# Patient Record
Sex: FEMALE | Race: WHITE | NOT HISPANIC OR LATINO | Employment: FULL TIME | URBAN - METROPOLITAN AREA
[De-identification: names, ages, dates, MRNs, and addresses within clinical notes are randomized per-mention and may not be internally consistent; named-entity substitution may affect disease eponyms.]

---
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
## 2018-07-04 ENCOUNTER — OFFICE VISIT (OUTPATIENT)
Dept: URGENT CARE | Facility: PHYSICIAN GROUP | Age: 18
End: 2018-07-04
Payer: COMMERCIAL

## 2018-07-04 VITALS
RESPIRATION RATE: 16 BRPM | HEART RATE: 97 BPM | DIASTOLIC BLOOD PRESSURE: 62 MMHG | WEIGHT: 124.8 LBS | BODY MASS INDEX: 21.31 KG/M2 | OXYGEN SATURATION: 98 % | SYSTOLIC BLOOD PRESSURE: 100 MMHG | TEMPERATURE: 98.6 F | HEIGHT: 64 IN

## 2018-07-04 DIAGNOSIS — K52.9 ACUTE GASTROENTERITIS: ICD-10-CM

## 2018-07-04 PROCEDURE — 99214 OFFICE O/P EST MOD 30 MIN: CPT | Performed by: FAMILY MEDICINE

## 2018-07-04 RX ORDER — DICYCLOMINE HCL 20 MG
TABLET ORAL
Qty: 20 TAB | Refills: 1 | Status: SHIPPED | OUTPATIENT
Start: 2018-07-04 | End: 2018-08-09

## 2018-07-04 RX ORDER — ONDANSETRON 4 MG/1
TABLET, ORALLY DISINTEGRATING ORAL
Qty: 15 TAB | Refills: 1 | Status: SHIPPED | OUTPATIENT
Start: 2018-07-04 | End: 2018-08-09

## 2018-07-04 NOTE — LETTER
July 4, 2018         Patient: Vita Giordano   YOB: 2000   Date of Visit: 7/4/2018           To Whom it May Concern:    Vita Giordano was seen in my clinic on 7/4/2018.     Please excuse from work for 7/3 thru and including 7/5/18 due to medical condition.    If you have any questions or concerns, please don't hesitate to call.        Sincerely,           Santos Anguiano M.D.  Electronically Signed

## 2018-07-04 NOTE — PROGRESS NOTES
Chief Complaint:    Chief Complaint   Patient presents with   • Abdominal Pain     x 3 days   • Diarrhea     x 3 days   • Nausea     x 1 day   • Emesis     x 1 day   • Gastrophageal Reflux       History of Present Illness:    This is a new problem. Symptoms started 7/2/18. She has had nausea, vomiting, diarrhea, stomach cramps, and occl GERD type of symptom. Currently, the stomach cramps and diarrhea persist, but not currently having GERD, nausea, or vomiting. Last vomited yesterday. Diarrhea has been too numerous to count for 7/2 and 7/3. 3 episodes of diarrhea today. No fever or urine symptoms. No close contacts with similar symptoms, recent travel, water activities such as lakes or rivers, or recent antibiotic use. Tried Pepto Bismol without help.      Review of Systems:    Constitutional: Negative for fever, chills, and diaphoresis.   Eyes: Negative for change in vision, photophobia, pain, redness, and discharge.  ENT: Negative for ear pain, ear discharge, hearing loss, tinnitus, nasal congestion, nosebleeds, and sore throat.    Respiratory: Negative for cough, hemoptysis, sputum production, shortness of breath, wheezing, and stridor.    Cardiovascular: Negative for chest pain, palpitations, orthopnea, claudication, leg swelling, and PND.   Gastrointestinal: See HPI.  Genitourinary: Negative for dysuria, urinary urgency, urinary frequency, hematuria, and flank pain.   Musculoskeletal: Negative for myalgias, joint pain, neck pain, and back pain.   Skin: Negative for rash and itching.   Neurological: Negative for dizziness, tingling, tremors, sensory change, speech change, focal weakness, seizures, loss of consciousness, and headaches.   Endo: Negative for polydipsia.   Heme: Does not bruise/bleed easily.   Psychiatric/Behavioral: Negative for depression, suicidal ideas, hallucinations, memory loss and substance abuse. The patient is not nervous/anxious and does not have insomnia.        Past Medical  "History:    History reviewed. No pertinent past medical history.    Past Surgical History:    History reviewed. No pertinent surgical history.    Social History:    Social History     Social History   • Marital status: Single     Spouse name: N/A   • Number of children: N/A   • Years of education: N/A     Occupational History   • Not on file.     Social History Main Topics   • Smoking status: Never Smoker   • Smokeless tobacco: Never Used   • Alcohol use No   • Drug use: No   • Sexual activity: Not on file     Other Topics Concern   • Not on file     Social History Narrative   • No narrative on file     Family History:    History reviewed. No pertinent family history.    Medications:    No current outpatient prescriptions on file prior to visit.     No current facility-administered medications on file prior to visit.      Allergies:    No Known Allergies      Vitals:    Vitals:    07/04/18 1046   BP: 100/62   Pulse: 97   Resp: 16   Temp: 37 °C (98.6 °F)   SpO2: 98%   Weight: 56.6 kg (124 lb 12.8 oz)   Height: 1.626 m (5' 4\")       Physical Exam:    Constitutional: Vital signs reviewed. Appears well-developed and well-nourished. No acute distress.   Eyes: Sclera white, conjunctivae clear.   ENT: External ears normal. Hearing normal.   Neck: Neck supple.   Cardiovascular: Regular rate and rhythm. No murmur.  Pulmonary/Chest: Respirations non-labored. Clear to auscultation bilaterally.  Abdomen: Bowel sounds are hyperactive. Soft, non-distended, mild diffuse tenderness to palpation.  Musculoskeletal: Normal gait. Normal range of motion. No muscular atrophy or weakness.  Neurological: Alert and oriented to person, place, and time. Muscle tone normal. Coordination normal.   Skin: No rashes or lesions. Warm, dry, normal turgor.  Psychiatric: Normal mood and affect. Behavior is normal. Judgment and thought content normal.       Assessment / Plan:    1. Acute gastroenteritis  - ondansetron (ZOFRAN ODT) 4 MG TABLET " DISPERSIBLE; 1 TAB, ALLOW TO DISINTEGRATE IN MOUTH, EVERY 8 HOURS ONLY IF NEEDED FOR NAUSEA OR VOMITING.  Dispense: 15 Tab; Refill: 1  - dicyclomine (BENTYL) 20 MG Tab; 1 TAB BY MOUTH EVERY 6 HOURS ONLY IF NEEDED FOR ABDOMINAL CRAMPS AND/OR DIARRHEA.  Dispense: 20 Tab; Refill: 1  There are no diagnoses linked to this encounter.      Work note given - excuse for 7/3 thru and including 7/5/18.    Discussed with her DDX and management options.    Likely viral etiology that will have to self-resolve.    Agreeable to medications prescribed.    Recommended stay hydrated with small but frequent quantities of p.o. fluids. May eat small quantities of soft, bland foods. Advance diet as tolerated.    Discussed signs and symptoms of dehydration and to go to ER if symptoms are severe for likely IVF (we do not do here).    May return to urgent care if getting worse, change in symptoms, or not better with time and above.

## 2018-07-17 ENCOUNTER — OFFICE VISIT (OUTPATIENT)
Dept: URGENT CARE | Facility: PHYSICIAN GROUP | Age: 18
End: 2018-07-17
Payer: COMMERCIAL

## 2018-07-17 ENCOUNTER — HOSPITAL ENCOUNTER (OUTPATIENT)
Dept: LAB | Facility: MEDICAL CENTER | Age: 18
End: 2018-07-17
Attending: PHYSICIAN ASSISTANT
Payer: COMMERCIAL

## 2018-07-17 VITALS
SYSTOLIC BLOOD PRESSURE: 108 MMHG | TEMPERATURE: 99.1 F | OXYGEN SATURATION: 97 % | WEIGHT: 129 LBS | HEIGHT: 64 IN | HEART RATE: 74 BPM | BODY MASS INDEX: 22.02 KG/M2 | DIASTOLIC BLOOD PRESSURE: 72 MMHG | RESPIRATION RATE: 16 BRPM

## 2018-07-17 DIAGNOSIS — R10.13 EPIGASTRIC PAIN: ICD-10-CM

## 2018-07-17 DIAGNOSIS — R11.2 NAUSEA AND VOMITING, INTRACTABILITY OF VOMITING NOT SPECIFIED, UNSPECIFIED VOMITING TYPE: ICD-10-CM

## 2018-07-17 LAB
ALBUMIN SERPL BCP-MCNC: 4.1 G/DL (ref 3.2–4.9)
ALBUMIN/GLOB SERPL: 1.4 G/DL
ALP SERPL-CCNC: 45 U/L (ref 30–99)
ALT SERPL-CCNC: 11 U/L (ref 2–50)
ANION GAP SERPL CALC-SCNC: 8 MMOL/L (ref 0–11.9)
APPEARANCE UR: NORMAL
AST SERPL-CCNC: 18 U/L (ref 12–45)
BASOPHILS # BLD AUTO: 1.4 % (ref 0–1.8)
BASOPHILS # BLD: 0.07 K/UL (ref 0–0.12)
BILIRUB SERPL-MCNC: 0.5 MG/DL (ref 0.1–1.5)
BILIRUB UR STRIP-MCNC: NEGATIVE MG/DL
BUN SERPL-MCNC: 14 MG/DL (ref 8–22)
C DIFF DNA SPEC QL NAA+PROBE: NEGATIVE
C DIFF TOX GENS STL QL NAA+PROBE: NEGATIVE
CALCIUM SERPL-MCNC: 9 MG/DL (ref 8.5–10.5)
CHLORIDE SERPL-SCNC: 109 MMOL/L (ref 96–112)
CO2 SERPL-SCNC: 23 MMOL/L (ref 20–33)
COLOR UR AUTO: NORMAL
CREAT SERPL-MCNC: 0.75 MG/DL (ref 0.5–1.4)
EOSINOPHIL # BLD AUTO: 0.33 K/UL (ref 0–0.51)
EOSINOPHIL NFR BLD: 6.5 % (ref 0–6.9)
ERYTHROCYTE [DISTWIDTH] IN BLOOD BY AUTOMATED COUNT: 42.3 FL (ref 35.9–50)
GLOBULIN SER CALC-MCNC: 3 G/DL (ref 1.9–3.5)
GLUCOSE SERPL-MCNC: 76 MG/DL (ref 65–99)
GLUCOSE UR STRIP.AUTO-MCNC: NEGATIVE MG/DL
HCT VFR BLD AUTO: 41.3 % (ref 37–47)
HGB BLD-MCNC: 13.7 G/DL (ref 12–16)
IMM GRANULOCYTES # BLD AUTO: 0 K/UL (ref 0–0.11)
IMM GRANULOCYTES NFR BLD AUTO: 0 % (ref 0–0.9)
INT CON NEG: NEGATIVE
INT CON POS: POSITIVE
KETONES UR STRIP.AUTO-MCNC: NEGATIVE MG/DL
LEUKOCYTE ESTERASE UR QL STRIP.AUTO: NEGATIVE
LIPASE SERPL-CCNC: 16 U/L (ref 11–82)
LYMPHOCYTES # BLD AUTO: 1.69 K/UL (ref 1–4.8)
LYMPHOCYTES NFR BLD: 33.5 % (ref 22–41)
MCH RBC QN AUTO: 29.8 PG (ref 27–33)
MCHC RBC AUTO-ENTMCNC: 33.2 G/DL (ref 33.6–35)
MCV RBC AUTO: 90 FL (ref 81.4–97.8)
MONOCYTES # BLD AUTO: 0.3 K/UL (ref 0–0.85)
MONOCYTES NFR BLD AUTO: 6 % (ref 0–13.4)
NEUTROPHILS # BLD AUTO: 2.65 K/UL (ref 2–7.15)
NEUTROPHILS NFR BLD: 52.6 % (ref 44–72)
NITRITE UR QL STRIP.AUTO: NEGATIVE
NRBC # BLD AUTO: 0 K/UL
NRBC BLD-RTO: 0 /100 WBC
PH UR STRIP.AUTO: 6 [PH] (ref 5–8)
PLATELET # BLD AUTO: 268 K/UL (ref 164–446)
PMV BLD AUTO: 10.3 FL (ref 9–12.9)
POC URINE PREGNANCY TEST: NEGATIVE
POTASSIUM SERPL-SCNC: 3.9 MMOL/L (ref 3.6–5.5)
PROT SERPL-MCNC: 7.1 G/DL (ref 6–8.2)
PROT UR QL STRIP: 100 MG/DL
RBC # BLD AUTO: 4.59 M/UL (ref 4.2–5.4)
RBC UR QL AUTO: NORMAL
SODIUM SERPL-SCNC: 140 MMOL/L (ref 135–145)
SP GR UR STRIP.AUTO: 1
UROBILINOGEN UR STRIP-MCNC: NEGATIVE MG/DL
WBC # BLD AUTO: 5 K/UL (ref 4.8–10.8)

## 2018-07-17 PROCEDURE — 80053 COMPREHEN METABOLIC PANEL: CPT

## 2018-07-17 PROCEDURE — 85025 COMPLETE CBC W/AUTO DIFF WBC: CPT

## 2018-07-17 PROCEDURE — 83013 H PYLORI (C-13) BREATH: CPT

## 2018-07-17 PROCEDURE — 81002 URINALYSIS NONAUTO W/O SCOPE: CPT | Performed by: PHYSICIAN ASSISTANT

## 2018-07-17 PROCEDURE — 87493 C DIFF AMPLIFIED PROBE: CPT

## 2018-07-17 PROCEDURE — 36415 COLL VENOUS BLD VENIPUNCTURE: CPT

## 2018-07-17 PROCEDURE — 99214 OFFICE O/P EST MOD 30 MIN: CPT | Mod: 25 | Performed by: PHYSICIAN ASSISTANT

## 2018-07-17 PROCEDURE — 87046 STOOL CULTR AEROBIC BACT EA: CPT

## 2018-07-17 PROCEDURE — 87045 FECES CULTURE AEROBIC BACT: CPT

## 2018-07-17 PROCEDURE — 83690 ASSAY OF LIPASE: CPT

## 2018-07-17 PROCEDURE — 81025 URINE PREGNANCY TEST: CPT | Performed by: PHYSICIAN ASSISTANT

## 2018-07-17 RX ORDER — OMEPRAZOLE 20 MG/1
20 CAPSULE, DELAYED RELEASE ORAL DAILY
Qty: 30 CAP | Refills: 0 | Status: SHIPPED | OUTPATIENT
Start: 2018-07-17 | End: 2018-08-09

## 2018-07-17 RX ORDER — ONDANSETRON 4 MG/1
4 TABLET, FILM COATED ORAL EVERY 4 HOURS PRN
Qty: 30 TAB | Refills: 0 | Status: SHIPPED | OUTPATIENT
Start: 2018-07-17 | End: 2018-08-09

## 2018-07-17 ASSESSMENT — ENCOUNTER SYMPTOMS
NAUSEA: 1
DIARRHEA: 1
FLANK PAIN: 0
DIZZINESS: 0
BLOOD IN STOOL: 0
FEVER: 0
MUSCULOSKELETAL NEGATIVE: 1
VOMITING: 1
CONSTIPATION: 0
CHILLS: 0
ABDOMINAL PAIN: 1
SHORTNESS OF BREATH: 0

## 2018-07-17 ASSESSMENT — PAIN SCALES - GENERAL: PAINLEVEL: 4=SLIGHT-MODERATE PAIN

## 2018-07-17 NOTE — PATIENT INSTRUCTIONS
Food Choices for Gastroesophageal Reflux Disease, Adult  When you have gastroesophageal reflux disease (GERD), the foods you eat and your eating habits are very important. Choosing the right foods can help ease the discomfort of GERD.  WHAT GENERAL GUIDELINES DO I NEED TO FOLLOW?  · Choose fruits, vegetables, whole grains, low-fat dairy products, and low-fat meat, fish, and poultry.  · Limit fats such as oils, salad dressings, butter, nuts, and avocado.  · Keep a food diary to identify foods that cause symptoms.  · Avoid foods that cause reflux. These may be different for different people.  · Eat frequent small meals instead of three large meals each day.  · Eat your meals slowly, in a relaxed setting.  · Limit fried foods.  · Cook foods using methods other than frying.  · Avoid drinking alcohol.  · Avoid drinking large amounts of liquids with your meals.  · Avoid bending over or lying down until 2-3 hours after eating.  WHAT FOODS ARE NOT RECOMMENDED?  The following are some foods and drinks that may worsen your symptoms:  Vegetables  Tomatoes. Tomato juice. Tomato and spaghetti sauce. Chili peppers. Onion and garlic. Horseradish.  Fruits  Oranges, grapefruit, and lemon (fruit and juice).  Meats  High-fat meats, fish, and poultry. This includes hot dogs, ribs, ham, sausage, salami, and cooper.  Dairy  Whole milk and chocolate milk. Sour cream. Cream. Butter. Ice cream. Cream cheese.   Beverages  Coffee and tea, with or without caffeine. Carbonated beverages or energy drinks.  Condiments  Hot sauce. Barbecue sauce.   Sweets/Desserts  Chocolate and cocoa. Donuts. Peppermint and spearmint.  Fats and Oils  High-fat foods, including French fries and potato chips.  Other  Vinegar. Strong spices, such as black pepper, white pepper, red pepper, cayenne, gary powder, cloves, ginger, and chili powder.  The items listed above may not be a complete list of foods and beverages to avoid. Contact your dietitian for more  information.     This information is not intended to replace advice given to you by your health care provider. Make sure you discuss any questions you have with your health care provider.     Document Released: 12/18/2006 Document Revised: 01/08/2016 Document Reviewed: 10/22/2014  [employer] Interactive Patient Education ©2016 [employer]

## 2018-07-17 NOTE — PROGRESS NOTES
"Subjective:      Vita Giordano is a 18 y.o. female who presents with Abdominal Pain (was seen on 4th of July for the same thing); Emesis; Diarrhea; and Headache            HPI   Patient presents to urgent ongoing issues with abdominal pain, nausea, vomiting, and diarrhea. She was originally seen 2 weeks ago, diagnosed with gastroenteritis and given zofran and bentyl. She states her symptoms resolved completely for about 1.5 weeks before returning last night. She states her diarrhea is darkly colored. No fevers, chills, back pain, constipation, or urinary symptoms. No personal or family history of colon disease. No recent travel, suspect food intake, recent use of antibiotics, or sick contacts. She denies frequent use of nsaids medications.     Review of Systems   Constitutional: Negative for chills and fever.   HENT: Negative for congestion.    Respiratory: Negative for shortness of breath.    Cardiovascular: Negative for chest pain.   Gastrointestinal: Positive for abdominal pain, diarrhea, nausea and vomiting. Negative for blood in stool and constipation.   Genitourinary: Negative for dysuria, flank pain, frequency, hematuria and urgency.   Musculoskeletal: Negative.    Skin: Negative for rash.   Neurological: Negative for dizziness.        Objective:     /72   Pulse 74   Temp 37.3 °C (99.1 °F)   Resp 16   Ht 1.626 m (5' 4\")   Wt 58.5 kg (129 lb)   LMP 06/11/2018   SpO2 97%   BMI 22.14 kg/m²      Physical Exam   Constitutional: She is oriented to person, place, and time. She appears well-developed and well-nourished. No distress.   HENT:   Head: Normocephalic and atraumatic.   Eyes: Pupils are equal, round, and reactive to light.   Neck: Normal range of motion.   Cardiovascular: Normal rate.    Pulmonary/Chest: Effort normal.   Abdominal: Soft. Normal appearance and bowel sounds are normal. There is tenderness in the epigastric area. There is no rebound, no CVA tenderness, no tenderness at McBurney's " point and negative Nichols's sign.   Genitourinary: Rectal exam shows guaiac positive stool. Rectal exam shows no external hemorrhoid, no internal hemorrhoid and no fissure.   Musculoskeletal: Normal range of motion.   Neurological: She is alert and oriented to person, place, and time.   Skin: Skin is warm and dry. She is not diaphoretic.   Psychiatric: She has a normal mood and affect. Her behavior is normal.   Nursing note and vitals reviewed.         PMH:  has no past medical history on file.  MEDS:   Current Outpatient Prescriptions:   •  Etonogestrel (NEXPLANON SC), Inject  as instructed., Disp: , Rfl:   •  omeprazole (PRILOSEC) 20 MG delayed-release capsule, Take 1 Cap by mouth every day., Disp: 30 Cap, Rfl: 0  •  ondansetron (ZOFRAN) 4 MG Tab tablet, Take 1 Tab by mouth every four hours as needed for Nausea/Vomiting., Disp: 30 Tab, Rfl: 0  •  ondansetron (ZOFRAN ODT) 4 MG TABLET DISPERSIBLE, 1 TAB, ALLOW TO DISINTEGRATE IN MOUTH, EVERY 8 HOURS ONLY IF NEEDED FOR NAUSEA OR VOMITING., Disp: 15 Tab, Rfl: 1  •  dicyclomine (BENTYL) 20 MG Tab, 1 TAB BY MOUTH EVERY 6 HOURS ONLY IF NEEDED FOR ABDOMINAL CRAMPS AND/OR DIARRHEA., Disp: 20 Tab, Rfl: 1  ALLERGIES: No Known Allergies  SURGHX: History reviewed. No pertinent surgical history.  SOCHX:  reports that she has never smoked. She has never used smokeless tobacco. She reports that she does not drink alcohol or use drugs.  FH: family history is not on file.    POCT Urinalysis:  Component Results     Component Value Ref Range & Units Status   POC Color Brown  Negative Final   POC Appearance Cloudy  Negative Final   POC Leukocyte Esterase Negative  Negative Final   POC Nitrites Negative  Negative Final   POC Urobiligen Negative  Negative (0.2) mg/dL Final   POC Protein 100  Negative mg/dL Final   POC Urine PH 6.0  5.0 - 8.0 Final   POC Blood Hemolyzed  Negative Final   POC Specific Gravity 1.005  <1.005 - >1.030 Final   POC Ketones Negative  Negative mg/dL Final   POC  Bilirubin Negative  Negative mg/dL Final   POC Glucose Negative  Negative mg/dL Final   Last Resulted Time   Tue Jul 17, 2018 12:04 PM        Assessment/Plan:     1. Epigastric pain  - POCT Urinalysis --> + blood (patient currently has menstrual spotting), otherwise normal  - POCT Pregnancy --> negative  - CBC WITH DIFFERENTIAL; Future  - COMP METABOLIC PANEL; Future  - CULTURE STOOL  - C DIFFICILE TOXINS A+B, EIA  - LIPASE; Future  - H. PYLORI BREATH TEST  - REFERRAL TO GASTROENTEROLOGY  - omeprazole (PRILOSEC) 20 MG delayed-release capsule; Take 1 Cap by mouth every day.  Dispense: 30 Cap; Refill: 0    2. Nausea and vomiting, intractability of vomiting not specified, unspecified vomiting type    - ondansetron (ZOFRAN) 4 MG Tab tablet; Take 1 Tab by mouth every four hours as needed for Nausea/Vomiting.  Dispense: 30 Tab; Refill: 0    Patient does have slight epigastric pain and + stool guaiac in clinic today. Will obtain blood work and test for h pylori. Advised to start taking prilosec 20 mg daily after obtaining breath test for h pylori. Zofran as needed for nausea. Discussed GERD diet and handout given. Increased fluids to prevent dehydration. Pending lab work results. The patient demonstrated a good understanding and agreed with the treatment plan.

## 2018-07-17 NOTE — LETTER
July 17, 2018         Patient: Vita Giordano   YOB: 2000   Date of Visit: 7/17/2018           To Whom it May Concern:    Vita Giordano was seen in my clinic on 7/17/2018. Please excuse her absence from 7/16/18-7/17/18    If you have any questions or concerns, please don't hesitate to call.        Sincerely,           Rosalina Urbina P.A.-C.  Electronically Signed

## 2018-07-20 ENCOUNTER — TELEPHONE (OUTPATIENT)
Dept: URGENT CARE | Facility: PHYSICIAN GROUP | Age: 18
End: 2018-07-20

## 2018-07-20 LAB
BACTERIA STL CULT: NORMAL
SIGNIFICANT IND 70042: NORMAL
SITE SITE: NORMAL
SOURCE SOURCE: NORMAL
UREA BREATH TEST QL: NEGATIVE

## 2018-07-20 NOTE — TELEPHONE ENCOUNTER
Spoke to patient and informed her of negative lab work results. She states she is feeling much improved, no nausea or vomiting since the date of visit. Encouraged to continue taking prilosec along with diet modification for 4 weeks. Follow up with GI if needed. She states understanding and appreciates the phone call.

## 2018-07-24 ENCOUNTER — TELEPHONE (OUTPATIENT)
Dept: URGENT CARE | Facility: PHYSICIAN GROUP | Age: 18
End: 2018-07-24

## 2018-08-09 ENCOUNTER — OFFICE VISIT (OUTPATIENT)
Dept: URGENT CARE | Facility: PHYSICIAN GROUP | Age: 18
End: 2018-08-09
Payer: COMMERCIAL

## 2018-08-09 VITALS
HEIGHT: 64 IN | TEMPERATURE: 98.4 F | WEIGHT: 131 LBS | SYSTOLIC BLOOD PRESSURE: 108 MMHG | DIASTOLIC BLOOD PRESSURE: 72 MMHG | BODY MASS INDEX: 22.36 KG/M2 | OXYGEN SATURATION: 100 % | HEART RATE: 88 BPM | RESPIRATION RATE: 16 BRPM

## 2018-08-09 DIAGNOSIS — Z78.9 MEDICATION INTOLERANCE: ICD-10-CM

## 2018-08-09 DIAGNOSIS — R11.2 DRUG-INDUCED NAUSEA AND VOMITING: ICD-10-CM

## 2018-08-09 DIAGNOSIS — T50.905A DRUG-INDUCED NAUSEA AND VOMITING: ICD-10-CM

## 2018-08-09 PROCEDURE — 99214 OFFICE O/P EST MOD 30 MIN: CPT | Performed by: PHYSICIAN ASSISTANT

## 2018-08-09 RX ORDER — IBUPROFEN 800 MG/1
800 TABLET ORAL EVERY 8 HOURS PRN
Qty: 24 TAB | Refills: 0 | Status: SHIPPED | OUTPATIENT
Start: 2018-08-09 | End: 2018-08-12

## 2018-08-09 RX ORDER — ONDANSETRON 4 MG/1
4 TABLET, FILM COATED ORAL EVERY 4 HOURS PRN
Qty: 20 TAB | Refills: 0 | Status: SHIPPED | OUTPATIENT
Start: 2018-08-09 | End: 2018-08-12

## 2018-08-09 ASSESSMENT — ENCOUNTER SYMPTOMS
VOMITING: 1
CONSTIPATION: 1
STRIDOR: 0
ABDOMINAL PAIN: 0
DIZZINESS: 1
NAUSEA: 1
BLOOD IN STOOL: 0
DIARRHEA: 0
FEVER: 0
COUGH: 0
SHORTNESS OF BREATH: 0
CHILLS: 0

## 2018-08-09 NOTE — LETTER
August 9, 2018         Patient: Vita Giordano   YOB: 2000   Date of Visit: 8/9/2018           To Whom it May Concern:    Vita Giordano was seen in my clinic on 8/9/2018. She may return to work on 8/10/18.    If you have any questions or concerns, please don't hesitate to call.        Sincerely,           Jessica Schrader P.A.-C.  Electronically Signed

## 2018-08-09 NOTE — PROGRESS NOTES
Subjective:   Vita Giordano is a 18 y.o. female who presents for Medication Reaction (to Norco)        Pt started taking Norco BID x 7 days for wisdom teeth extractions. Pt describes Nausea, constipation, vomiting, dizziness. Pt able to keep down fluids. Pt describes pain as 5/10 from vomiting after taking medications. No fever or chills. Pt has been given rx of Ibuprofen 800 mg to alternated between doses of Norco. Pt has not taken Norco previously. Denies SOB, difficulty breathing/swallowing. Denies blood in stool or dark/tarry stool. Nothing improves and taking medication worsens sx.       Review of Systems   Constitutional: Negative for chills, fever and malaise/fatigue.   Respiratory: Negative for cough, shortness of breath and stridor.    Cardiovascular: Negative for chest pain.   Gastrointestinal: Positive for constipation, nausea and vomiting. Negative for abdominal pain, blood in stool, diarrhea and melena.   Genitourinary: Negative for dysuria.   Skin: Negative for rash.   Neurological: Positive for dizziness.   All other systems reviewed and are negative.      PMH:  has no past medical history on file.  MEDS:   Current Outpatient Prescriptions:   •  ondansetron (ZOFRAN) 4 MG Tab tablet, Take 1 Tab by mouth every four hours as needed for Nausea/Vomiting for up to 3 days., Disp: 20 Tab, Rfl: 0  •  ibuprofen (MOTRIN) 800 MG Tab, Take 1 Tab by mouth every 8 hours as needed for up to 3 days., Disp: 24 Tab, Rfl: 0  •  Etonogestrel (NEXPLANON SC), Inject  as instructed., Disp: , Rfl:   ALLERGIES:   Allergies   Allergen Reactions   • Norco [Hydrocodone-Acetaminophen] Vomiting     SURGHX: History reviewed. No pertinent surgical history.  SOCHX:  reports that she has never smoked. She has never used smokeless tobacco. She reports that she does not drink alcohol or use drugs.  History reviewed. No pertinent family history.     Objective:   /72   Pulse 88   Temp 36.9 °C (98.4 °F)   Resp 16   Ht 1.626 m (5'  "4\")   Wt 59.4 kg (131 lb)   SpO2 100%   BMI 22.49 kg/m²     Physical Exam   Constitutional: She is oriented to person, place, and time. She appears well-developed and well-nourished. No distress.   HENT:   Head: Normocephalic and atraumatic.   Mouth/Throat: Oropharynx is clear and moist and mucous membranes are normal. No oral lesions. No dental abscesses. No posterior oropharyngeal edema or posterior oropharyngeal erythema.   Eyes: Pupils are equal, round, and reactive to light. Conjunctivae are normal.   Neck: Normal range of motion. Neck supple. No tracheal deviation present.   Cardiovascular: Normal rate and regular rhythm.    Pulmonary/Chest: Effort normal and breath sounds normal. No stridor.   Lymphadenopathy:     She has no cervical adenopathy.   Neurological: She is alert and oriented to person, place, and time.   Skin: Skin is warm and dry.   Psychiatric: She has a normal mood and affect. Her behavior is normal.   Vitals reviewed.        Assessment/Plan:     1. Drug-induced nausea and vomiting  ondansetron (ZOFRAN) 4 MG Tab tablet    ibuprofen (MOTRIN) 800 MG Tab   2. Medication intolerance  ibuprofen (MOTRIN) 800 MG Tab     Pt directed to start Zofran as needed, start BRAT diet and educational handout on nausea provided. Pt given rx for ibuprofen for any additional pain. We discussed medication intolerance and patient appears understanding of information.     Differential diagnosis, natural history, supportive care discussed. Follow-up with primary care provider within 7-10 days, emergency room precautions discussed.     HPI, history, and exam reviewed.   Yadiel Burris PA-C agrees to assessment and plan.      "

## 2018-08-09 NOTE — PATIENT INSTRUCTIONS
Nausea, Adult  Introduction  Feeling sick to your stomach (nausea) means that your stomach is upset or you feel like you have to throw up (vomit). Feeling sick to your stomach is usually not serious, but it may be an early sign of a more serious medical problem. As you feel sicker to your stomach, it can lead to throwing up (vomiting). If you throw up, or if you are not able to drink enough fluids, there is a risk of dehydration. Dehydration can make you feel tired and thirsty, have a dry mouth, and pee (urinate) less often. Older adults and people who have other diseases or a weak defense (immune) system have a higher risk of dehydration.  The main goal of treating this condition is to:  · Limit how often you feel sick to your stomach.  · Prevent throwing up and dehydration.  Follow these instructions at home:  Follow instructions from your doctor about how to care for yourself at home.  Eating and drinking  Follow these recommendations as told by your doctor:  · Take an oral rehydration solution (ORS). This is a drink that is sold at pharmacies and stores.  · Drink clear fluids in small amounts as you are able, such as:  ¨ Water.  ¨ Ice chips.  ¨ Fruit juice that has water added (diluted fruit juice).  ¨ Low-calorie sports drinks.  · Eat bland, easy to digest foods in small amounts as you are able, such as:  ¨ Bananas.  ¨ Applesauce.  ¨ Rice.  ¨ Lean meats.  ¨ Toast.  ¨ Crackers.  · Avoid drinking fluids that contain a lot of sugar or caffeine.  · Avoid alcohol.  · Avoid spicy or fatty foods.  General instructions  · Drink enough fluid to keep your pee (urine) clear or pale yellow.  · Wash your hands often. If you cannot use soap and water, use hand .  · Make sure that all people in your household wash their hands well and often.  · Rest at home while you get better.  · Take over-the-counter and prescription medicines only as told by your doctor.  · Breathe slowly and deeply when you feel sick to your  stomach.  · Watch your condition for any changes.  · Keep all follow-up visits as told by your doctor. This is important.  Contact a doctor if:  · You have a headache.  · You have new symptoms.  · You feel sicker to your stomach.  · You have a fever.  · You feel light-headed or dizzy.  · You throw up.  · You are not able to keep fluids down.  Get help right away if:  · You have pain in your chest, neck, arm, or jaw.  · You feel very weak or you pass out (faint).  · You have throw up that is bright red or looks like coffee grounds.  · You have bloody or black poop (stools), or poop that looks like tar.  · You have a very bad headache, a stiff neck, or both.  · You have very bad pain, cramping, or bloating in your belly.  · You have a rash.  · You have trouble breathing or you are breathing very quickly.  · Your heart is beating very quickly.  · Your skin feels cold and clammy.  · You feel confused.  · You have pain while peeing.  · You have signs of dehydration, such as:  ¨ Dark pee, or very little or no pee.  ¨ Cracked lips.  ¨ Dry mouth.  ¨ Sunken eyes.  ¨ Sleepiness.  ¨ Weakness.  These symptoms may be an emergency. Do not wait to see if the symptoms will go away. Get medical help right away. Call your local emergency services (911 in the [place] Do not drive yourself to the hospital.   This information is not intended to replace advice given to you by your health care provider. Make sure you discuss any questions you have with your health care provider.  Document Released: 12/06/2012 Document Revised: 05/25/2017 Document Reviewed: 08/23/2016  © 2017 [employer]

## 2018-09-27 ENCOUNTER — HOSPITAL ENCOUNTER (OUTPATIENT)
Dept: LAB | Facility: MEDICAL CENTER | Age: 18
End: 2018-09-27
Attending: FAMILY MEDICINE
Payer: COMMERCIAL

## 2018-09-27 ENCOUNTER — OFFICE VISIT (OUTPATIENT)
Dept: URGENT CARE | Facility: PHYSICIAN GROUP | Age: 18
End: 2018-09-27
Payer: COMMERCIAL

## 2018-09-27 VITALS
HEART RATE: 104 BPM | RESPIRATION RATE: 16 BRPM | TEMPERATURE: 98.4 F | SYSTOLIC BLOOD PRESSURE: 110 MMHG | OXYGEN SATURATION: 100 % | HEIGHT: 64 IN | BODY MASS INDEX: 23.05 KG/M2 | WEIGHT: 135 LBS | DIASTOLIC BLOOD PRESSURE: 78 MMHG

## 2018-09-27 DIAGNOSIS — R10.13 EPIGASTRIC PAIN: ICD-10-CM

## 2018-09-27 DIAGNOSIS — R11.10 VOMITING AND DIARRHEA: ICD-10-CM

## 2018-09-27 DIAGNOSIS — R19.7 VOMITING AND DIARRHEA: ICD-10-CM

## 2018-09-27 LAB
ALBUMIN SERPL BCP-MCNC: 4.2 G/DL (ref 3.2–4.9)
ALBUMIN/GLOB SERPL: 1.4 G/DL
ALP SERPL-CCNC: 47 U/L (ref 30–99)
ALT SERPL-CCNC: 13 U/L (ref 2–50)
ANION GAP SERPL CALC-SCNC: 6 MMOL/L (ref 0–11.9)
APPEARANCE UR: NORMAL
AST SERPL-CCNC: 17 U/L (ref 12–45)
BASOPHILS # BLD AUTO: 0.6 % (ref 0–1.8)
BASOPHILS # BLD: 0.05 K/UL (ref 0–0.12)
BILIRUB SERPL-MCNC: 0.6 MG/DL (ref 0.1–1.5)
BILIRUB UR STRIP-MCNC: NORMAL MG/DL
BUN SERPL-MCNC: 13 MG/DL (ref 8–22)
CALCIUM SERPL-MCNC: 9.3 MG/DL (ref 8.5–10.5)
CHLORIDE SERPL-SCNC: 106 MMOL/L (ref 96–112)
CO2 SERPL-SCNC: 23 MMOL/L (ref 20–33)
COLOR UR AUTO: NORMAL
CREAT SERPL-MCNC: 0.64 MG/DL (ref 0.5–1.4)
EOSINOPHIL # BLD AUTO: 0.33 K/UL (ref 0–0.51)
EOSINOPHIL NFR BLD: 3.9 % (ref 0–6.9)
ERYTHROCYTE [DISTWIDTH] IN BLOOD BY AUTOMATED COUNT: 38.9 FL (ref 35.9–50)
GLOBULIN SER CALC-MCNC: 3.1 G/DL (ref 1.9–3.5)
GLUCOSE SERPL-MCNC: 90 MG/DL (ref 65–99)
GLUCOSE UR STRIP.AUTO-MCNC: NORMAL MG/DL
HCT VFR BLD AUTO: 42.7 % (ref 37–47)
HGB BLD-MCNC: 14.4 G/DL (ref 12–16)
IMM GRANULOCYTES # BLD AUTO: 0.02 K/UL (ref 0–0.11)
IMM GRANULOCYTES NFR BLD AUTO: 0.2 % (ref 0–0.9)
KETONES UR STRIP.AUTO-MCNC: NORMAL MG/DL
LEUKOCYTE ESTERASE UR QL STRIP.AUTO: NORMAL
LIPASE SERPL-CCNC: 14 U/L (ref 11–82)
LYMPHOCYTES # BLD AUTO: 2.04 K/UL (ref 1–4.8)
LYMPHOCYTES NFR BLD: 24.4 % (ref 22–41)
MCH RBC QN AUTO: 30.1 PG (ref 27–33)
MCHC RBC AUTO-ENTMCNC: 33.7 G/DL (ref 33.6–35)
MCV RBC AUTO: 89.1 FL (ref 81.4–97.8)
MONOCYTES # BLD AUTO: 0.55 K/UL (ref 0–0.85)
MONOCYTES NFR BLD AUTO: 6.6 % (ref 0–13.4)
NEUTROPHILS # BLD AUTO: 5.37 K/UL (ref 2–7.15)
NEUTROPHILS NFR BLD: 64.3 % (ref 44–72)
NITRITE UR QL STRIP.AUTO: NORMAL
NRBC # BLD AUTO: 0 K/UL
NRBC BLD-RTO: 0 /100 WBC
PH UR STRIP.AUTO: 6 [PH] (ref 5–8)
PLATELET # BLD AUTO: 289 K/UL (ref 164–446)
PMV BLD AUTO: 10 FL (ref 9–12.9)
POTASSIUM SERPL-SCNC: 4.3 MMOL/L (ref 3.6–5.5)
PROT SERPL-MCNC: 7.3 G/DL (ref 6–8.2)
PROT UR QL STRIP: NORMAL MG/DL
RBC # BLD AUTO: 4.79 M/UL (ref 4.2–5.4)
RBC UR QL AUTO: NORMAL
SODIUM SERPL-SCNC: 135 MMOL/L (ref 135–145)
SP GR UR STRIP.AUTO: 1.02
UROBILINOGEN UR STRIP-MCNC: 2 MG/DL
WBC # BLD AUTO: 8.4 K/UL (ref 4.8–10.8)

## 2018-09-27 PROCEDURE — 81002 URINALYSIS NONAUTO W/O SCOPE: CPT | Performed by: INTERNAL MEDICINE

## 2018-09-27 PROCEDURE — 99214 OFFICE O/P EST MOD 30 MIN: CPT | Performed by: INTERNAL MEDICINE

## 2018-09-27 PROCEDURE — 80053 COMPREHEN METABOLIC PANEL: CPT

## 2018-09-27 PROCEDURE — 36415 COLL VENOUS BLD VENIPUNCTURE: CPT

## 2018-09-27 PROCEDURE — 85025 COMPLETE CBC W/AUTO DIFF WBC: CPT

## 2018-09-27 PROCEDURE — 83690 ASSAY OF LIPASE: CPT

## 2018-09-27 RX ORDER — ONDANSETRON HYDROCHLORIDE 8 MG/1
8 TABLET, FILM COATED ORAL EVERY 8 HOURS PRN
Qty: 15 TAB | Refills: 0 | Status: SHIPPED | OUTPATIENT
Start: 2018-09-27 | End: 2019-04-28

## 2018-09-27 RX ORDER — OMEPRAZOLE 20 MG/1
20 CAPSULE, DELAYED RELEASE ORAL 2 TIMES DAILY
Qty: 30 CAP | Refills: 0 | Status: SHIPPED | OUTPATIENT
Start: 2018-09-27 | End: 2019-04-28

## 2018-09-27 ASSESSMENT — ENCOUNTER SYMPTOMS
VOMITING: 1
FLANK PAIN: 0
FEVER: 0
SHORTNESS OF BREATH: 0
COUGH: 0
CHILLS: 0
HEADACHES: 0
NAUSEA: 1
DIARRHEA: 1
SORE THROAT: 0
ABDOMINAL PAIN: 1

## 2018-09-27 NOTE — PROGRESS NOTES
Subjective:     Vita Giordano is a 18 y.o. female who presents for Headache (with vomitting, diarrhea, stomach ache)    HPI  Pt presents for evaluation of a new problem  Headache, vomiting, abd pain, and diarrhea for the past 2 days   Started vomiting 2 days ago.  Last emesis was early this morning   Epigastric pain which radiates to the left abdomen   Pain is a sharp pain and worse after eating   Pain does not radiate  Pain today is a little better   Emesis is yellow and looks like food   Stools are yellow and mixed solid and liquid   No blood in stool or emesis   No sick contacts   Started after eating [employer] Rigoberto (cheeseburger) and was wondering if it was food poisoning   Last menstrual period was 2 months ago, no vaginal discharge   No dysuria, urgency, hesitancy, or other urinary symptoms   Pt has Nexplanon implanted   Pt not eating much, still able to drink and not feeling dehydrated or dizzy     Review of Systems   Constitutional: Negative for chills and fever.   HENT: Negative for congestion and sore throat.    Respiratory: Negative for cough and shortness of breath.    Cardiovascular: Negative for chest pain.   Gastrointestinal: Positive for abdominal pain, diarrhea, nausea and vomiting.   Genitourinary: Negative for dysuria, flank pain, frequency, hematuria and urgency.   Skin: Negative for rash.   Neurological: Negative for headaches.   All other systems reviewed and are negative.    PMH: No chronic medical conditions   MEDS:   Current Outpatient Prescriptions:   •  Etonogestrel (NEXPLANON SC), Inject  as instructed., Disp: , Rfl:   ALLERGIES:   Allergies   Allergen Reactions   • Norco [Hydrocodone-Acetaminophen] Vomiting     SURGHX: None   SOCHX:  reports that she has never smoked. She has never used smokeless tobacco. She reports that she does not drink alcohol or use drugs.  FH: Family history was reviewed, not contributory to current pain     Objective:   /78 (BP Location: Left arm, Patient  "Position: Sitting, BP Cuff Size: Adult)   Pulse (!) 104   Temp 36.9 °C (98.4 °F) (Temporal)   Resp 16   Ht 1.626 m (5' 4\")   Wt 61.2 kg (135 lb)   SpO2 100%   BMI 23.17 kg/m²     Physical Exam   Constitutional: She appears well-developed and well-nourished. No distress.   HENT:   Head: Normocephalic and atraumatic.   Right Ear: External ear normal.   Left Ear: External ear normal.   Nose: Nose normal.   Eyes: Conjunctivae and EOM are normal. Right eye exhibits no discharge. Left eye exhibits no discharge. No scleral icterus.   Neck: Normal range of motion. No tracheal deviation present.   Cardiovascular: Normal rate, regular rhythm and normal heart sounds.    Pulmonary/Chest: Effort normal and breath sounds normal. No respiratory distress. She has no wheezes. She has no rales.   Abdominal: Soft. Normal appearance. She exhibits no distension. Bowel sounds are increased. There is no hepatosplenomegaly. There is tenderness in the epigastric area. There is no rigidity, no rebound, no guarding, no CVA tenderness, no tenderness at McBurney's point and negative Nichols's sign.   Neurological: She is alert. Coordination normal.   Skin: Skin is warm and dry. No rash noted. She is not diaphoretic.   Psychiatric: She has a normal mood and affect. Her behavior is normal. Judgment and thought content normal.          Assessment/Plan:   Assessment    1. Vomiting and diarrhea  2. Epigastric pain  Pt with hx consistent with viral gastroenteritis, however she is more tender than expected for typical gastroenteritis.  Differential includes viral gastroenteritis vs gastritis vs pancreatitis vs biliary colic.  No fevers, no vaginal symptoms, no urinary symptoms.  Her urine dip in office was negative.  Given a GI cocktail in office which helped pain moderately.  Will obtain stat labs to evaluate electrolytes, WBC, and rule out pancreatitis.  Pt will be empirically treated with Zofran and Omeprazole.  Will call with results and " discus further plan.  Extensively reviewed red flag symptoms and emergency precautions.  Depending on lab results, pt is aware that she may need abdominal imaging.  Will F/U via phone.   - ondansetron (ZOFRAN) 8 MG Tab; Take 1 Tab by mouth every 8 hours as needed for Nausea/Vomiting.  Dispense: 15 Tab; Refill: 0  - omeprazole (PRILOSEC) 20 MG delayed-release capsule; Take 1 Cap by mouth 2 times a day.  Dispense: 30 Cap; Refill: 0  - CBC WITH DIFFERENTIAL; Future  - COMP METABOLIC PANEL; Future  - LIPASE; Future  Case and results reviewed and agree with treatment plan as outlined.  Dr. Chatman

## 2018-09-27 NOTE — LETTER
September 27, 2018    To Whom It May Concern:         This is confirmation that Vita Giordano attended her scheduled appointment with Miguelangel Charles M.D. on 9/27/18.  She is anticipated to return to work on 9/30/18.         If you have any questions please do not hesitate to call me at the phone number listed below.    Sincerely,          Miguelangel Charles M.D.  [phone]

## 2018-10-24 ENCOUNTER — OFFICE VISIT (OUTPATIENT)
Dept: URGENT CARE | Facility: CLINIC | Age: 18
End: 2018-10-24
Payer: COMMERCIAL

## 2018-10-24 VITALS
HEIGHT: 65 IN | HEART RATE: 84 BPM | BODY MASS INDEX: 23.06 KG/M2 | RESPIRATION RATE: 16 BRPM | OXYGEN SATURATION: 98 % | SYSTOLIC BLOOD PRESSURE: 112 MMHG | DIASTOLIC BLOOD PRESSURE: 76 MMHG | TEMPERATURE: 98.9 F | WEIGHT: 138.4 LBS

## 2018-10-24 DIAGNOSIS — M25.562 CHRONIC PAIN OF LEFT KNEE: ICD-10-CM

## 2018-10-24 DIAGNOSIS — G89.29 CHRONIC PAIN OF LEFT KNEE: ICD-10-CM

## 2018-10-24 DIAGNOSIS — M23.92 KNEE LOCKING, LEFT: ICD-10-CM

## 2018-10-24 PROCEDURE — 99213 OFFICE O/P EST LOW 20 MIN: CPT | Performed by: NURSE PRACTITIONER

## 2018-10-24 ASSESSMENT — ENCOUNTER SYMPTOMS
CHILLS: 0
TINGLING: 0
SENSORY CHANGE: 0
FEVER: 0

## 2018-10-24 ASSESSMENT — PAIN SCALES - GENERAL: PAINLEVEL: 6=MODERATE PAIN

## 2018-10-24 NOTE — PROGRESS NOTES
"Subjective:      Vita Giordano is a 18 y.o. female who presents with Knee Injury (x LT knee pain)            Patient comes in today with left knee pain.  She reports she has always had \"bad knees\".  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  She reports when the patella dislocates, it \"pops\" back in place.  She was told her patella was \"too small\".  She saw an orthopaedist when she was 11 and told to consider surgery when done growing.  She followed up at age 13 and was told to wait until she was older.  She reports a recent increase in pain.  She notes lateral joint and posterior knee pain.  She reports the knee will lock when she is trying to walk and she fell 2 days ago.  It feels unstable as if it will give out randomly.  She denies any new trauma or strain.  She denies any numbness or tingling.          Review of Systems   Constitutional: Negative for chills, fever and malaise/fatigue.   Musculoskeletal: Positive for joint pain.   Neurological: Negative for tingling and sensory change.     Medications, Allergies, and current problem list reviewed today in Epic     Objective:     /76 (BP Location: Left arm, Patient Position: Sitting, BP Cuff Size: Small adult)   Pulse 84   Temp 37.2 °C (98.9 °F) (Temporal)   Resp 16   Ht 1.638 m (5' 4.5\")   Wt 62.8 kg (138 lb 6.4 oz)   SpO2 98%   BMI 23.39 kg/m²      Physical Exam   Constitutional: She is oriented to person, place, and time. She appears well-developed and well-nourished. No distress.   Cardiovascular: Normal rate.  Exam reveals no friction rub.    Pulmonary/Chest: Effort normal.   Musculoskeletal: She exhibits no edema.        Left knee: She exhibits decreased range of motion and swelling. She exhibits no effusion, no ecchymosis, no deformity, no laceration and no erythema. Tenderness found. Lateral joint line tenderness noted.        Legs:  Left knee is warm, dry, and intact with no bruising, erythema, rash or lesion.  " 1+ generalized swelling.  Diffuse TTP of the lateral joint line and posteriorly.  Patella tracks midline.  ROM limited with pain on extension and flexion.  Pain with varus and valgus stress.  Pain with posterior drawer.  No pain with anterior drawer.  No joint instability.  Able to bear weight. Slight antalgic gait.   Neurological: She is alert and oriented to person, place, and time.   Skin: She is not diaphoretic.   Vitals reviewed.              Assessment/Plan:     1. Chronic pain of left knee  - REFERRAL TO ORTHOPEDICS    2. Knee locking, left  - REFERRAL TO ORTHOPEDICS    Discussed exam findings with patient.    Follow up with orthopaedics as referred.    Knee immobilizer provided in clinic.  Avoid prolonged use as may cause surrounding muscle weakness.  OTC analgesics prn pain.  Ice and elevation prn.  Patient verbalized understanding of and agreed with plan of care.

## 2018-10-24 NOTE — LETTER
October 24, 2018         Patient: Vita Giordano   YOB: 2000   Date of Visit: 10/24/2018           To Whom it May Concern:    Vita Giordano was seen in my clinic on 10/24/2018. She may return to work in 3 days as symptoms improve.    If you have any questions or concerns, please don't hesitate to call.        Sincerely,           MOHAMUD Mcclain.  Electronically Signed

## 2018-12-26 ENCOUNTER — OFFICE VISIT (OUTPATIENT)
Dept: URGENT CARE | Facility: CLINIC | Age: 18
End: 2018-12-26
Payer: COMMERCIAL

## 2018-12-26 VITALS
SYSTOLIC BLOOD PRESSURE: 112 MMHG | OXYGEN SATURATION: 99 % | WEIGHT: 130 LBS | TEMPERATURE: 98.2 F | HEART RATE: 106 BPM | HEIGHT: 64 IN | BODY MASS INDEX: 22.2 KG/M2 | DIASTOLIC BLOOD PRESSURE: 78 MMHG | RESPIRATION RATE: 16 BRPM

## 2018-12-26 DIAGNOSIS — K12.2 UVULITIS: ICD-10-CM

## 2018-12-26 PROCEDURE — 99214 OFFICE O/P EST MOD 30 MIN: CPT | Performed by: PHYSICIAN ASSISTANT

## 2018-12-26 RX ORDER — AMOXICILLIN 500 MG/1
CAPSULE ORAL
Qty: 20 CAP | Refills: 0 | Status: SHIPPED | OUTPATIENT
Start: 2018-12-26 | End: 2019-04-28

## 2018-12-26 ASSESSMENT — ENCOUNTER SYMPTOMS
ABDOMINAL PAIN: 0
TROUBLE SWALLOWING: 1
COUGH: 0
SWOLLEN GLANDS: 0
HEADACHES: 1
DIARRHEA: 0

## 2018-12-26 NOTE — PROGRESS NOTES
"Subjective:      Vita Giordano is a 18 y.o. female who presents with Pharyngitis (ear pain/sore throat)            Pharyngitis    This is a new problem. The current episode started yesterday. Neither side of throat is experiencing more pain than the other. There has been no fever. The pain is at a severity of 4/10. The pain is mild. Associated symptoms include ear pain, headaches and trouble swallowing. Pertinent negatives include no abdominal pain, congestion, coughing, diarrhea, drooling or swollen glands. She has had no exposure to strep or mono. She has tried acetaminophen for the symptoms. The treatment provided mild relief.     Past medical history: Is not pertinent to this examination  Past surgical history: Not pertinent to this examination  Family history: Is not pertinent to this examination  Allergies: Norco [hydrocodone-acetaminophen]  Social history: Is reviewed in Epic  Meds: OTC    Review of Systems   HENT: Positive for ear pain and trouble swallowing. Negative for congestion and drooling.    Respiratory: Negative for cough.    Gastrointestinal: Negative for abdominal pain and diarrhea.   Neurological: Positive for headaches.          Objective:     /78 (BP Location: Right arm, Patient Position: Sitting, BP Cuff Size: Adult)   Pulse (!) 106   Temp 36.8 °C (98.2 °F) (Temporal)   Resp 16   Ht 1.626 m (5' 4\")   Wt 59 kg (130 lb)   SpO2 99%   BMI 22.31 kg/m²      Physical Exam       Gen.: Patient is A and O ×3, no acute distress, well-nourished well-hydrated  Vitals: Are listed and unremarkable  HEENT: Heads normocephalic, atraumatic, PERRLA, extraocular movements intact, TMs clear and oropharynx shows enlarged erythematous uvula that is not deviated   neck: Soft supple with bilateral anterior cervical lymphadenopathy  Cardiovascular: Regular rate and rhythm normal S1 and S2. No murmurs, rubs or gallops  Lungs are clear to auscultation bilaterally. no wheezes rales or rhonchi  Abdomen is " soft, nontender, nondistended with good bowel sounds, no hepatosplenomegaly  Skin: Is well perfused without evidence of rash or lesions  Neurological:  cranial nerves II through XII were assessed and intact.  Musculoskeletal: Full range of motion, 5 out of 5 strength against resistance  Neurovascularly: Intact with a 2 second cap refill, good distal pulses      Urgent CARE course: Rapid strep was deferred     Assessment/Plan:     1. Uvulitis  Rapid strep was deferred as group A strep can be common cause of uvulitis, but is often missed on testing.  Discussed supportive care measures and anti-inflammatory use and fill prescription and follow-up as needed  - amoxicillin (AMOXIL) 500 MG Cap; Take one pill twice a day for ten days  Dispense: 20 Cap; Refill: 0

## 2018-12-26 NOTE — LETTER
December 26, 2018         Patient: Vita Giordano   YOB: 2000   Date of Visit: 12/26/2018           To Whom it May Concern:    Vita Giordano was seen in my clinic on 12/26/2018.  Please excuse from work on 12/25/2018 and 12/26/2018.    If you have any questions or concerns, please don't hesitate to call.        Sincerely,           Charito Polk P.A.-C.  Electronically Signed

## 2019-01-21 ENCOUNTER — HOSPITAL ENCOUNTER (OUTPATIENT)
Dept: LAB | Facility: MEDICAL CENTER | Age: 19
End: 2019-01-21
Attending: ORTHOPAEDIC SURGERY
Payer: COMMERCIAL

## 2019-01-21 LAB
HCG SERPL QL: NEGATIVE
HCT VFR BLD AUTO: 40 % (ref 37–47)

## 2019-01-21 PROCEDURE — 84703 CHORIONIC GONADOTROPIN ASSAY: CPT

## 2019-01-21 PROCEDURE — 36415 COLL VENOUS BLD VENIPUNCTURE: CPT

## 2019-01-21 PROCEDURE — 85014 HEMATOCRIT: CPT

## 2019-04-16 ENCOUNTER — TELEPHONE (OUTPATIENT)
Dept: SCHEDULING | Facility: IMAGING CENTER | Age: 19
End: 2019-04-16

## 2019-04-28 ENCOUNTER — OFFICE VISIT (OUTPATIENT)
Dept: URGENT CARE | Facility: PHYSICIAN GROUP | Age: 19
End: 2019-04-28
Payer: COMMERCIAL

## 2019-04-28 ENCOUNTER — HOSPITAL ENCOUNTER (OUTPATIENT)
Facility: MEDICAL CENTER | Age: 19
End: 2019-04-28
Attending: PHYSICIAN ASSISTANT
Payer: COMMERCIAL

## 2019-04-28 VITALS
HEART RATE: 108 BPM | WEIGHT: 136 LBS | BODY MASS INDEX: 23.34 KG/M2 | TEMPERATURE: 98.1 F | OXYGEN SATURATION: 98 % | RESPIRATION RATE: 16 BRPM

## 2019-04-28 DIAGNOSIS — R10.84 GENERALIZED ABDOMINAL PAIN: ICD-10-CM

## 2019-04-28 DIAGNOSIS — R10.2 VAGINAL PAIN: ICD-10-CM

## 2019-04-28 LAB
APPEARANCE UR: NORMAL
BILIRUB UR STRIP-MCNC: NORMAL MG/DL
COLOR UR AUTO: NORMAL
GLUCOSE UR STRIP.AUTO-MCNC: NORMAL MG/DL
KETONES UR STRIP.AUTO-MCNC: NORMAL MG/DL
LEUKOCYTE ESTERASE UR QL STRIP.AUTO: NORMAL
NITRITE UR QL STRIP.AUTO: NORMAL
PH UR STRIP.AUTO: 5.5 [PH] (ref 5–8)
PROT UR QL STRIP: NORMAL MG/DL
RBC UR QL AUTO: NORMAL
SP GR UR STRIP.AUTO: 1.03
UROBILINOGEN UR STRIP-MCNC: 0.2 MG/DL

## 2019-04-28 PROCEDURE — 99214 OFFICE O/P EST MOD 30 MIN: CPT | Performed by: PHYSICIAN ASSISTANT

## 2019-04-28 PROCEDURE — 87591 N.GONORRHOEAE DNA AMP PROB: CPT

## 2019-04-28 PROCEDURE — 81002 URINALYSIS NONAUTO W/O SCOPE: CPT | Performed by: PHYSICIAN ASSISTANT

## 2019-04-28 PROCEDURE — 87480 CANDIDA DNA DIR PROBE: CPT

## 2019-04-28 PROCEDURE — 87510 GARDNER VAG DNA DIR PROBE: CPT

## 2019-04-28 PROCEDURE — 87660 TRICHOMONAS VAGIN DIR PROBE: CPT

## 2019-04-28 PROCEDURE — 87491 CHLMYD TRACH DNA AMP PROBE: CPT

## 2019-04-28 NOTE — PROGRESS NOTES
"Chief Complaint   Patient presents with   • Vaginal Pain     off and on since Feb       HISTORY OF PRESENT ILLNESS: Patient is a 19 y.o. female who presents today for the following:    Vaginal pain off and on x 2-3 months  Vaginal discharge x 2-3 days in the beginning only  NOT worse with intercourse  Pain feels deep, occasionally causes abdominal pain  Urinating causes worsening deep vaginal pain  No h/o PAP; appt with GYN next month for PAP  Denies N/V, fever, bowel changes in bowels, genital rashes  Nexplanon in place for the last 4-5 years; regular periods  LMP: 1-2 weeks PTA  Spotting started yesterday  + low back pain  Vaginal pain not always with period - seems random  Episodes 1-2x/week, \"all day pain\"     There are no active problems to display for this patient.      Allergies:Norco [hydrocodone-acetaminophen]    Current Outpatient Prescriptions Ordered in [facility]   Medication Sig Dispense Refill   • Etonogestrel (NEXPLANON SC) Inject  as instructed.       No current [facility]-ordered facility-administered medications on file.        No past medical history on file.    Social History   Substance Use Topics   • Smoking status: Never Smoker   • Smokeless tobacco: Never Used   • Alcohol use No       No family status information on file.   No family history on file.    Review of Systems:   Constitutional ROS: No unexpected change in weight, No weakness, No fatigue  Eye ROS: No recent significant change in vision, No eye pain, redness, discharge  Ear ROS: No drainage, No tinnitus or vertigo, No recent change in hearing  Mouth/Throat ROS: No teeth or gum problems, No bleeding gums, No tongue complaints  Neck ROS: No swollen glands, No significant pain in neck  Pulmonary ROS: No chronic cough, sputum, or hemoptysis, No dyspnea on exertion, No wheezing  Cardiovascular ROS: No diaphoresis, No edema, No palpitations  Gastrointestinal ROS: Positive for intermittent abdominal pain.  Musculoskeletal/Extremities ROS: No " peripheral edema, No pain, redness or swelling on the joints  Hematologic/Lymphatic ROS: No chills, No night sweats, No weight loss  Skin/Integumentary ROS: No edema, No evidence of rash, No itching      Exam:  Pulse (!) 108   Temp 36.7 °C (98.1 °F) (Temporal)   Resp 16   Wt 61.7 kg (136 lb)   SpO2 98%   General: Well developed, well nourished. No distress.  HEENT: Head is grossly normal.  Pulmonary: Unlabored respiratory effort. Lungs clear to auscultation, no wheezes, no rhonchi.  Cardiovascular: Regular rate and rhythm without murmur.   Back: No CVA tenderness noted.  Abdomen: Soft, nondistended.  Diffuse mild tenderness without guarding or rebound.  Bowel sounds within normal limits.  No hepatosplenomegaly.   : Deferred by patient.  Neurologic: Grossly nonfocal. No facial asymmetry noted.  Skin: Warm, dry, good turgor. No rashes in visible areas.   Psych: Normal mood. Alert and oriented x3. Judgment and insight is normal.    UA: Large blood, otherwise negative  Urine hCG: Negative    Assessment/Plan:  We will contact patient with culture results.  Follow-up with gynecology next month as scheduled.  Follow up for worsening or persistent symptoms.  1. Vaginal pain  VAGINAL PATHOGENS DNA PANEL    CHLAMYDIA/GC AMP URINE OR SWAB    POCT Urinalysis    POCT PREGNANCY   2. Generalized abdominal pain

## 2019-04-29 ENCOUNTER — OFFICE VISIT (OUTPATIENT)
Dept: MEDICAL GROUP | Facility: PHYSICIAN GROUP | Age: 19
End: 2019-04-29
Payer: COMMERCIAL

## 2019-04-29 VITALS
OXYGEN SATURATION: 97 % | DIASTOLIC BLOOD PRESSURE: 84 MMHG | HEART RATE: 79 BPM | TEMPERATURE: 99.4 F | RESPIRATION RATE: 16 BRPM | HEIGHT: 65 IN | BODY MASS INDEX: 23.16 KG/M2 | WEIGHT: 139 LBS | SYSTOLIC BLOOD PRESSURE: 122 MMHG

## 2019-04-29 DIAGNOSIS — F33.1 MODERATE EPISODE OF RECURRENT MAJOR DEPRESSIVE DISORDER (HCC): ICD-10-CM

## 2019-04-29 DIAGNOSIS — Z13.6 SCREENING FOR CARDIOVASCULAR CONDITION: ICD-10-CM

## 2019-04-29 DIAGNOSIS — Z86.59 HISTORY OF DEPRESSION: ICD-10-CM

## 2019-04-29 DIAGNOSIS — R10.2 VAGINAL PAIN: ICD-10-CM

## 2019-04-29 PROCEDURE — 99214 OFFICE O/P EST MOD 30 MIN: CPT | Performed by: NURSE PRACTITIONER

## 2019-04-29 RX ORDER — SERTRALINE HYDROCHLORIDE 25 MG/1
50 TABLET, FILM COATED ORAL DAILY
Qty: 180 TAB | Refills: 1 | Status: SHIPPED | OUTPATIENT
Start: 2019-04-29 | End: 2020-11-22

## 2019-04-29 ASSESSMENT — PATIENT HEALTH QUESTIONNAIRE - PHQ9
CLINICAL INTERPRETATION OF PHQ2 SCORE: 3
SUM OF ALL RESPONSES TO PHQ QUESTIONS 1-9: 12
5. POOR APPETITE OR OVEREATING: 1 - SEVERAL DAYS

## 2019-04-29 NOTE — PATIENT INSTRUCTIONS
Have labs done before you see me in 6-8 weeks    Will have you start sertraline, 25 mg daily for 1 week, then increase to 2 pills daily    Referral to behavioral health

## 2019-04-29 NOTE — PROGRESS NOTES
Chief Complaint   Patient presents with   • Establish Care   • Depression     patient would like new medication         This is a 19 y.o.female patient that presents today with the following: Establish care with new PCP discussed depression    History of depression  Patient reports history of depression, she believes she has been depressed ever since she was a young preteen but has never formally been treated for this.  This has not been triggered by any particular event.  She reports to me that she has good support system including boyfriend as well as family.  She adamantly denies suicidal homicidal ideations, hallucinations, racing thoughts and flights of ideas.  She is very agreeable to therapy, referral has been placed.  In the interim she will start sertraline 25 mg daily for 1 week and increase this to 50 mg daily.  She is going to follow-up with me in 6 to 8 weeks with labs done before visit.    Moderate episode of recurrent major depressive disorder (HCC)  See additional notes      Office Visit on 04/28/2019   Component Date Value   • POC Color 04/28/2019 brown    • POC Appearance 04/28/2019 cloudy    • POC Leukocyte Esterase 04/28/2019 neg    • POC Nitrites 04/28/2019 neg    • POC Urobiligen 04/28/2019 0.2    • POC Protein 04/28/2019 neg    • POC Urine PH 04/28/2019 5.5    • POC Blood 04/28/2019 large    • POC Specific Gravity 04/28/2019 1.030    • POC Ketones 04/28/2019 neg    • POC Bilirubin 04/28/2019 neg    • POC Glucose 04/28/2019 neg          clinical course has been stable    Past Medical History:   Diagnosis Date   • Depression        Past Surgical History:   Procedure Laterality Date   • KNEE ARTHROSCOPY Left    • OTHER      oral, wisdom teeth pulled   • TONSILLECTOMY         Family History   Problem Relation Age of Onset   • Depression Mother    • Other Mother         hydrocephalus   • Heart Disease Father    • Depression Father    • Diabetes Maternal Grandfather        Jaguar  "[hydrocodone-acetaminophen]    Current Outpatient Prescriptions Ordered in [facility]   Medication Sig Dispense Refill   • sertraline (ZOLOFT) 25 MG tablet Take 2 Tabs by mouth every day. 180 Tab 1   • Etonogestrel (NEXPLANON SC) Inject  as instructed.       No current [facility]-ordered facility-administered medications on file.        Constitutional ROS: No unexpected change in weight, No weakness, No unexplained fevers, sweats, or chills  Pulmonary ROS: No chronic cough, sputum, or hemoptysis, No shortness of breath, No recent change in breathing  Cardiovascular ROS: No chest pain, No edema, No palpitations  Gastrointestinal ROS: No abdominal pain, No nausea, vomiting, diarrhea, or constipation  Musculoskeletal/Extremities ROS: No clubbing, No peripheral edema, No pain, redness or swelling on the joints  Neurologic ROS: Normal development, No seizures, No weakness  Psychiatric ROS: Positive for depression    Physical exam:  /84   Pulse 79   Temp 37.4 °C (99.4 °F) (Temporal)   Resp 16   Ht 1.638 m (5' 4.5\")   Wt 63 kg (139 lb)   SpO2 97%   BMI 23.49 kg/m²   General Appearance: Pleasant young female, alert, no distress, well-nourished, well-groomed  Skin: Skin color, texture, turgor normal. No rashes or lesions.  Lungs: negative findings: normal respiratory rate and rhythm, lungs clear to auscultation  Heart: negative. RRR without murmur, gallop, or rubs.  No ectopy.  Abdomen: Abdomen soft, non-tender. BS normal. No masses,  No organomegaly  Musculoskeletal: negative findings: no evidence of joint instability, no evidence of muscle atrophy, no deformities present  Neurologic: intact, CN II through XII grossly intact    Medical decision making/discussion: We discussed at length treatment options and that gold standard includes medication as well as therapy, referral was placed and she will start sertraline as mentioned above.  She is going to have routine fasting labs before she follows up with me in 6 to 8 " weeks.    Vita was seen today for establish care and depression.    Diagnoses and all orders for this visit:    Moderate episode of recurrent major depressive disorder (HCC)  -     sertraline (ZOLOFT) 25 MG tablet; Take 2 Tabs by mouth every day.  -     CBC WITH DIFFERENTIAL; Future  -     TSH WITH REFLEX TO FT4; Future  -     VITAMIN D,25 HYDROXY; Future  -     VITAMIN B12; Future  -     REFERRAL TO BEHAVIORAL HEALTH    History of depression    Screening for cardiovascular condition  -     Comp Metabolic Panel; Future  -     Lipid Profile; Future        Return in about 6 weeks (around 6/10/2019) for Follow-up, Discuss Labs.        Please note that this dictation was created using voice recognition software. I have made every reasonable attempt to correct obvious errors, but I expect that there are errors of grammar and possibly content that I did not discover before finalizing the note.

## 2019-04-30 PROBLEM — F33.1 MODERATE EPISODE OF RECURRENT MAJOR DEPRESSIVE DISORDER (HCC): Status: ACTIVE | Noted: 2019-04-30

## 2019-04-30 LAB
C TRACH DNA SPEC QL NAA+PROBE: NEGATIVE
CANDIDA DNA VAG QL PROBE+SIG AMP: NEGATIVE
G VAGINALIS DNA VAG QL PROBE+SIG AMP: POSITIVE
N GONORRHOEA DNA SPEC QL NAA+PROBE: NEGATIVE
SPECIMEN SOURCE: NORMAL
T VAGINALIS DNA VAG QL PROBE+SIG AMP: NEGATIVE

## 2019-04-30 NOTE — ASSESSMENT & PLAN NOTE
Patient reports history of depression, she believes she has been depressed ever since she was a young preteen but has never formally been treated for this.  This has not been triggered by any particular event.  She reports to me that she has good support system including boyfriend as well as family.  She adamantly denies suicidal homicidal ideations, hallucinations, racing thoughts and flights of ideas.  She is very agreeable to therapy, referral has been placed.  In the interim she will start sertraline 25 mg daily for 1 week and increase this to 50 mg daily.  She is going to follow-up with me in 6 to 8 weeks with labs done before visit.

## 2019-05-01 DIAGNOSIS — B96.89 BACTERIAL VAGINOSIS: ICD-10-CM

## 2019-05-01 DIAGNOSIS — N76.0 BACTERIAL VAGINOSIS: ICD-10-CM

## 2019-05-01 RX ORDER — METRONIDAZOLE 500 MG/1
500 TABLET ORAL 2 TIMES DAILY
Qty: 14 TAB | Refills: 0 | Status: SHIPPED | OUTPATIENT
Start: 2019-05-01 | End: 2019-05-08

## 2019-05-02 DIAGNOSIS — R11.0 NAUSEA: ICD-10-CM

## 2019-05-02 RX ORDER — ONDANSETRON 8 MG/1
8 TABLET, ORALLY DISINTEGRATING ORAL EVERY 8 HOURS PRN
Qty: 20 TAB | Refills: 0 | Status: SHIPPED | OUTPATIENT
Start: 2019-05-02 | End: 2020-11-22

## 2019-07-08 ENCOUNTER — OFFICE VISIT (OUTPATIENT)
Dept: URGENT CARE | Facility: CLINIC | Age: 19
End: 2019-07-08
Payer: COMMERCIAL

## 2019-07-08 ENCOUNTER — HOSPITAL ENCOUNTER (OUTPATIENT)
Dept: LAB | Facility: MEDICAL CENTER | Age: 19
End: 2019-07-08
Attending: PHYSICIAN ASSISTANT
Payer: COMMERCIAL

## 2019-07-08 ENCOUNTER — HOSPITAL ENCOUNTER (OUTPATIENT)
Facility: MEDICAL CENTER | Age: 19
End: 2019-07-08
Attending: PHYSICIAN ASSISTANT
Payer: COMMERCIAL

## 2019-07-08 VITALS
HEIGHT: 65 IN | BODY MASS INDEX: 22.16 KG/M2 | DIASTOLIC BLOOD PRESSURE: 84 MMHG | TEMPERATURE: 98.1 F | SYSTOLIC BLOOD PRESSURE: 110 MMHG | RESPIRATION RATE: 18 BRPM | HEART RATE: 98 BPM | OXYGEN SATURATION: 97 % | WEIGHT: 133 LBS

## 2019-07-08 DIAGNOSIS — Z11.3 SCREENING EXAMINATION FOR SEXUALLY TRANSMITTED DISEASE: ICD-10-CM

## 2019-07-08 LAB
APPEARANCE UR: CLEAR
BILIRUB UR STRIP-MCNC: NORMAL MG/DL
COLOR UR AUTO: YELLOW
GLUCOSE UR STRIP.AUTO-MCNC: NORMAL MG/DL
HIV 1+2 AB+HIV1 P24 AG SERPL QL IA: NON REACTIVE
INT CON NEG: NORMAL
INT CON POS: NORMAL
KETONES UR STRIP.AUTO-MCNC: NORMAL MG/DL
LEUKOCYTE ESTERASE UR QL STRIP.AUTO: NORMAL
NITRITE UR QL STRIP.AUTO: NORMAL
PH UR STRIP.AUTO: 7 [PH] (ref 5–8)
POC URINE PREGNANCY TEST: NORMAL
PROT UR QL STRIP: NORMAL MG/DL
RBC UR QL AUTO: NORMAL
SP GR UR STRIP.AUTO: 1.01
TREPONEMA PALLIDUM IGG+IGM AB [PRESENCE] IN SERUM OR PLASMA BY IMMUNOASSAY: NON REACTIVE
UROBILINOGEN UR STRIP-MCNC: 0.2 MG/DL

## 2019-07-08 PROCEDURE — 36415 COLL VENOUS BLD VENIPUNCTURE: CPT

## 2019-07-08 PROCEDURE — 87591 N.GONORRHOEAE DNA AMP PROB: CPT

## 2019-07-08 PROCEDURE — 81025 URINE PREGNANCY TEST: CPT | Performed by: PHYSICIAN ASSISTANT

## 2019-07-08 PROCEDURE — 99213 OFFICE O/P EST LOW 20 MIN: CPT | Performed by: PHYSICIAN ASSISTANT

## 2019-07-08 PROCEDURE — 86780 TREPONEMA PALLIDUM: CPT

## 2019-07-08 PROCEDURE — 87389 HIV-1 AG W/HIV-1&-2 AB AG IA: CPT

## 2019-07-08 PROCEDURE — 87660 TRICHOMONAS VAGIN DIR PROBE: CPT

## 2019-07-08 PROCEDURE — 87510 GARDNER VAG DNA DIR PROBE: CPT

## 2019-07-08 PROCEDURE — 81002 URINALYSIS NONAUTO W/O SCOPE: CPT | Performed by: PHYSICIAN ASSISTANT

## 2019-07-08 PROCEDURE — 87491 CHLMYD TRACH DNA AMP PROBE: CPT

## 2019-07-08 PROCEDURE — 87480 CANDIDA DNA DIR PROBE: CPT

## 2019-07-08 ASSESSMENT — ENCOUNTER SYMPTOMS
FLANK PAIN: 0
ABDOMINAL PAIN: 0
FEVER: 0
VOMITING: 0
CHILLS: 0
DIARRHEA: 0
SHORTNESS OF BREATH: 0
NAUSEA: 0
CONSTIPATION: 0

## 2019-07-08 NOTE — PROGRESS NOTES
"Subjective:   Vita Giordano is a 19 y.o. female who presents for Exposure to STD (Pt states she has had no partners with STDs, No current Sx)       Patient presents today for STD screening. She denies any current symptoms or partners with known symptoms. She states that she has had unprotected sex recently and wants to be tested for reassurance.       Review of Systems   Constitutional: Negative for chills and fever.   Respiratory: Negative for shortness of breath.    Cardiovascular: Negative for chest pain.   Gastrointestinal: Negative for abdominal pain, constipation, diarrhea, nausea and vomiting.   Genitourinary: Negative for dysuria, flank pain, frequency, hematuria and urgency.        Negative for vaginal discharge, odor, itching       PMH:  has a past medical history of Depression.    MEDS:   Current Outpatient Prescriptions:   •  sertraline (ZOLOFT) 25 MG tablet, Take 2 Tabs by mouth every day., Disp: 180 Tab, Rfl: 1  •  Etonogestrel (NEXPLANON SC), Inject  as instructed., Disp: , Rfl:   •  ondansetron (ZOFRAN ODT) 8 MG TABLET DISPERSIBLE, Take 1 Tab by mouth every 8 hours as needed for Nausea. (Patient not taking: Reported on 7/8/2019), Disp: 20 Tab, Rfl: 0    ALLERGIES:   Allergies   Allergen Reactions   • Norco [Hydrocodone-Acetaminophen] Vomiting       SURGHX:   Past Surgical History:   Procedure Laterality Date   • KNEE ARTHROSCOPY Left    • OTHER      oral, wisdom teeth pulled   • TONSILLECTOMY         SOCHX:  reports that she has never smoked. She has never used smokeless tobacco. She reports that she does not drink alcohol or use drugs.    FH: Reviewed with patient, not pertinent to this visit.     Objective:   /84   Pulse 98   Temp 36.7 °C (98.1 °F) (Temporal)   Resp 18   Ht 1.638 m (5' 4.5\")   Wt 60.3 kg (133 lb)   SpO2 97%   BMI 22.48 kg/m²   Physical Exam   Constitutional: She is oriented to person, place, and time. She appears well-developed and well-nourished. No distress.   HENT: "   Head: Normocephalic and atraumatic.   Nose: Nose normal.   Eyes: Conjunctivae and EOM are normal.   Neck: Normal range of motion. No tracheal deviation present.   Pulmonary/Chest: Effort normal. No respiratory distress.   Musculoskeletal:   ROM normal all four extremities   Neurological: She is alert and oriented to person, place, and time.   Skin: Skin is warm and dry.   Psychiatric: She has a normal mood and affect. Her behavior is normal. Judgment and thought content normal.   Vitals reviewed.      Assessment/Plan:   1. Screening examination for sexually transmitted disease  - POCT Urinalysis  - POCT Pregnancy  - VAGINAL PATHOGENS DNA PANEL; Future  - CHLAMYDIA/GC PCR URINE OR SWAB; Future  - HIV AG/AB COMBO ASSAY SCREENING; Future    - Advised to avoid sex until she is given results  - Will call patient with results, treatment options. Patient states that message can be left.  - Advised to return as needed    Differential diagnosis, natural history, supportive care, and indications for immediate follow-up discussed.

## 2019-07-09 LAB
C TRACH DNA SPEC QL NAA+PROBE: NEGATIVE
CANDIDA DNA VAG QL PROBE+SIG AMP: NEGATIVE
G VAGINALIS DNA VAG QL PROBE+SIG AMP: NEGATIVE
N GONORRHOEA DNA SPEC QL NAA+PROBE: NEGATIVE
SPECIMEN SOURCE: NORMAL
T VAGINALIS DNA VAG QL PROBE+SIG AMP: NEGATIVE

## 2019-08-26 ENCOUNTER — OFFICE VISIT (OUTPATIENT)
Dept: URGENT CARE | Facility: CLINIC | Age: 19
End: 2019-08-26

## 2019-08-26 VITALS
HEIGHT: 64 IN | TEMPERATURE: 98.7 F | HEART RATE: 80 BPM | RESPIRATION RATE: 16 BRPM | SYSTOLIC BLOOD PRESSURE: 106 MMHG | BODY MASS INDEX: 21.94 KG/M2 | WEIGHT: 128.53 LBS | DIASTOLIC BLOOD PRESSURE: 72 MMHG | OXYGEN SATURATION: 98 %

## 2019-08-26 DIAGNOSIS — R11.2 NON-INTRACTABLE VOMITING WITH NAUSEA, UNSPECIFIED VOMITING TYPE: ICD-10-CM

## 2019-08-26 DIAGNOSIS — H92.02 OTALGIA, LEFT: ICD-10-CM

## 2019-08-26 DIAGNOSIS — J02.9 ACUTE PHARYNGITIS, UNSPECIFIED ETIOLOGY: Primary | ICD-10-CM

## 2019-08-26 LAB
INT CON NEG: NORMAL
INT CON POS: NORMAL
S PYO AG THROAT QL: NEGATIVE

## 2019-08-26 PROCEDURE — 87880 STREP A ASSAY W/OPTIC: CPT | Performed by: PHYSICIAN ASSISTANT

## 2019-08-26 PROCEDURE — 99214 OFFICE O/P EST MOD 30 MIN: CPT | Performed by: PHYSICIAN ASSISTANT

## 2019-08-26 RX ORDER — AMOXICILLIN 875 MG/1
875 TABLET, COATED ORAL 2 TIMES DAILY
Qty: 20 TAB | Refills: 0 | Status: SHIPPED | OUTPATIENT
Start: 2019-08-26 | End: 2020-11-22

## 2019-08-26 RX ORDER — AMOXICILLIN 875 MG/1
875 TABLET, COATED ORAL 2 TIMES DAILY
Qty: 20 TAB | Refills: 0 | Status: SHIPPED | OUTPATIENT
Start: 2019-08-26 | End: 2019-08-26 | Stop reason: SDUPTHER

## 2019-08-26 RX ORDER — ONDANSETRON 4 MG/1
4 TABLET, ORALLY DISINTEGRATING ORAL EVERY 6 HOURS PRN
Qty: 10 TAB | Refills: 0 | Status: SHIPPED | OUTPATIENT
Start: 2019-08-26 | End: 2020-11-22

## 2019-08-26 ASSESSMENT — ENCOUNTER SYMPTOMS
SORE THROAT: 1
NAUSEA: 1
FEVER: 1
HEADACHES: 1
COUGH: 0
VOMITING: 1
CHILLS: 1
SWOLLEN GLANDS: 1

## 2019-08-26 NOTE — LETTER
August 26, 2019         Patient: Vita Giordano   YOB: 2000   Date of Visit: 8/26/2019           To Whom it May Concern:    Vita Giordano was seen in my clinic on 8/26/2019. She may return to work on 8/28/2019.     If you have any questions or concerns, please don't hesitate to call.        Sincerely,           Yessenia Pritchard P.A.-C.  Electronically Signed

## 2019-08-26 NOTE — PROGRESS NOTES
Subjective:      Vita Giordano is a 19 y.o. female who presents with Otalgia (x 3 days  / throat pain x 3 days / vomiting )    PMH:  has a past medical history of Depression.  MEDS:   Current Outpatient Medications:   •  amoxicillin (AMOXIL) 875 MG tablet, Take 1 Tab by mouth 2 times a day., Disp: 20 Tab, Rfl: 0  •  sertraline (ZOLOFT) 25 MG tablet, Take 2 Tabs by mouth every day., Disp: 180 Tab, Rfl: 1  •  Etonogestrel (NEXPLANON SC), Inject  as instructed., Disp: , Rfl:   •  ondansetron (ZOFRAN ODT) 8 MG TABLET DISPERSIBLE, Take 1 Tab by mouth every 8 hours as needed for Nausea. (Patient not taking: Reported on 7/8/2019), Disp: 20 Tab, Rfl: 0  ALLERGIES:   Allergies   Allergen Reactions   • Norco [Hydrocodone-Acetaminophen] Vomiting     SURGHX:   Past Surgical History:   Procedure Laterality Date   • KNEE ARTHROSCOPY Left    • OTHER      oral, wisdom teeth pulled   • TONSILLECTOMY       SOCHX:  reports that she has never smoked. She has never used smokeless tobacco. She reports that she does not drink alcohol or use drugs.  FH: Reviewed with patient, not pertinent to this visit.           Patient presents with:  Otalgia: x 3 days  / throat pain x 3 days / vomiting x one this morning. Pt states this is typical strep symptoms for her.        Pharyngitis    This is a new problem. Episode onset: 3 days. The problem has been gradually worsening. The pain is worse on the left side. The maximum temperature recorded prior to her arrival was 100.4 - 100.9 F. The fever has been present for 1 to 2 days. The pain is at a severity of 7/10. Associated symptoms include ear pain, headaches, a plugged ear sensation, swollen glands and vomiting (once). Pertinent negatives include no congestion, coughing or ear discharge. She has tried cool liquids and NSAIDs for the symptoms. The treatment provided mild relief.       Review of Systems   Constitutional: Positive for chills and fever.   HENT: Positive for ear pain and sore throat.  "Negative for congestion and ear discharge.    Respiratory: Negative for cough.    Gastrointestinal: Positive for nausea and vomiting (once).   Neurological: Positive for headaches.   All other systems reviewed and are negative.         Objective:     /72 (BP Location: Left arm, Patient Position: Sitting, BP Cuff Size: Large adult)   Pulse 80   Temp 37.1 °C (98.7 °F) (Temporal)   Resp 16   Ht 1.626 m (5' 4\")   Wt 58.3 kg (128 lb 8.5 oz)   SpO2 98%   BMI 22.06 kg/m²      Physical Exam   Constitutional: She is oriented to person, place, and time. She appears well-developed and well-nourished. No distress.   HENT:   Head: Normocephalic and atraumatic.   Right Ear: Tympanic membrane normal.   Left Ear: Tympanic membrane normal.   Nose: Nose normal.   Mouth/Throat: Uvula is midline. No uvula swelling. Posterior oropharyngeal erythema present. Tonsils are 0 on the right. Tonsils are 0 on the left.   Strep smelling breath   Eyes: Pupils are equal, round, and reactive to light. Conjunctivae and EOM are normal.   Neck: Normal range of motion. Neck supple.   Cardiovascular: Normal rate, regular rhythm and normal heart sounds.   Pulmonary/Chest: Effort normal and breath sounds normal.   Abdominal: Soft.   Musculoskeletal: Normal range of motion.   Lymphadenopathy:        Head (right side): Submandibular adenopathy present.        Head (left side): Submandibular adenopathy present.     She has cervical adenopathy.        Right cervical: Superficial cervical adenopathy present.        Left cervical: Superficial cervical adenopathy present.   Neurological: She is alert and oriented to person, place, and time. Gait normal.   Skin: Skin is warm and dry. Capillary refill takes less than 2 seconds.   Psychiatric: She has a normal mood and affect.   Nursing note and vitals reviewed.         Strep: neg     Assessment/Plan:     1. Acute pharyngitis, unspecified etiology  POCT Rapid Strep A    amoxicillin (AMOXIL) 875 MG " tablet        2. Non-intractable vomiting with nausea, unspecified vomiting type  amoxicillin (AMOXIL) 875 MG tablet        3. Otalgia, left  amoxicillin (AMOXIL) 875 MG tablet        Rapid strep negative but possible Group C/Group G strep.     Motrin/Advil/Ibuprophen 600 mg every 6 hours as needed for pain or fever.    PT advised saltwater gargles/swishes  3-4 times daily until symptoms improve.     PT should follow up with PCP in 1-2 days for re-evaluation if symptoms have not improved.  Discussed red flags and reasons to return to UC or ED.  Pt and/or family verbalized understanding of diagnosis and follow up instructions and was offered informational handout on diagnosis.  PT discharged.

## 2020-11-22 ENCOUNTER — OFFICE VISIT (OUTPATIENT)
Dept: URGENT CARE | Facility: PHYSICIAN GROUP | Age: 20
End: 2020-11-22
Payer: MEDICAID

## 2020-11-22 VITALS
HEART RATE: 94 BPM | OXYGEN SATURATION: 97 % | DIASTOLIC BLOOD PRESSURE: 72 MMHG | RESPIRATION RATE: 16 BRPM | HEIGHT: 64 IN | SYSTOLIC BLOOD PRESSURE: 120 MMHG | WEIGHT: 147 LBS | BODY MASS INDEX: 25.1 KG/M2 | TEMPERATURE: 97.8 F

## 2020-11-22 DIAGNOSIS — H69.93 DYSFUNCTION OF BOTH EUSTACHIAN TUBES: ICD-10-CM

## 2020-11-22 DIAGNOSIS — H92.01 OTALGIA OF RIGHT EAR: ICD-10-CM

## 2020-11-22 PROCEDURE — 99213 OFFICE O/P EST LOW 20 MIN: CPT | Performed by: PHYSICIAN ASSISTANT

## 2020-11-22 ASSESSMENT — PAIN SCALES - GENERAL: PAINLEVEL: 6=MODERATE PAIN

## 2020-11-22 NOTE — PROGRESS NOTES
Chief Complaint   Patient presents with   • Otalgia     x2 days, (R) ear, sharp shooting pain.        HISTORY OF PRESENT ILLNESS: Patient is a 20 y.o. female who presents today for the following:    Patient comes in for evaluation of right ear pain for the past 2 days.  She has fleeting intermittent sharp shooting pain in the right ear.  She denies difficulty hearing and drainage.  She denies any nasal congestion, cough, fever, body aches, chills, shortness of breath.  She reports history of the same back in the spring.  She has not taken any over-the-counter medication.    Patient Active Problem List    Diagnosis Date Noted   • Moderate episode of recurrent major depressive disorder (HCC) 04/30/2019   • History of depression 04/29/2019       Allergies:Norco [hydrocodone-acetaminophen]    Current Outpatient Medications Ordered in Epic   Medication Sig Dispense Refill   • Etonogestrel (NEXPLANON SC) Inject  as instructed.       No current [facility]-ordered facility-administered medications on file.        Past Medical History:   Diagnosis Date   • Depression        Social History     Tobacco Use   • Smoking status: Never Smoker   • Smokeless tobacco: Never Used   Substance Use Topics   • Alcohol use: No   • Drug use: No       Family Status   Relation Name Status   • Mo  (Not Specified)   • Fa  (Not Specified)   • MGFa  (Not Specified)     Family History   Problem Relation Age of Onset   • Depression Mother    • Other Mother         hydrocephalus   • Heart Disease Father    • Depression Father    • Diabetes Maternal Grandfather        Review of Systems:   Constitutional ROS: No unexpected change in weight, No weakness, No fatigue  Eye ROS: No recent significant change in vision, No eye pain, redness, discharge  Ear ROS: Right ear pain  Mouth/Throat ROS: No teeth or gum problems, No bleeding gums, No tongue complaints  Neck ROS: No swollen glands, No significant pain in neck  Pulmonary ROS: No chronic cough, sputum, or  "hemoptysis, No dyspnea on exertion, No wheezing  Cardiovascular ROS: No diaphoresis, No edema, No palpitations  Musculoskeletal/Extremities ROS: No peripheral edema, No pain, redness or swelling on the joints  Hematologic/Lymphatic ROS: No chills, No night sweats, No weight loss  Skin/Integumentary ROS: No edema, No evidence of rash, No itching      Exam:  /72   Pulse 94   Temp 36.6 °C (97.8 °F) (Temporal)   Resp 16   Ht 1.626 m (5' 4\")   Wt 66.7 kg (147 lb)   SpO2 97%   General: Well developed, well nourished. No distress.    Eye: PERRL/EOMI; conjunctivae clear, lids normal.  ENMT: Lips without lesions, MMM. Oropharynx is clear. Bilateral TMs are within normal limits but with serous effusions and bulging bilaterally.  Pulmonary: Unlabored respiratory effort.   Neurologic: Grossly nonfocal. No facial asymmetry noted.  Skin: Warm, dry, good turgor. No rashes in visible areas.   Psych: Normal mood. Alert and oriented to person, place and time.    Assessment/Plan:  Discussed likely due to seasonal allergies.  Vitals and exam are unremarkable. Discussed appropriate over-the-counter symptomatic medication, and when to return to clinic. Follow up for worsening or persistent symptoms.  1. Otalgia of right ear     2. Dysfunction of both eustachian tubes         "

## 2020-11-22 NOTE — LETTER
November 22, 2020         Patient: Vita Giordano   YOB: 2000   Date of Visit: 11/22/2020           To Whom it May Concern:    Vita Giordano was seen in my clinic on 11/22/2020.     Please excuse patient for missing school/work until COVID results are available, typically taking 1-3 days.  They have been advised to quarantine during this time.    If you have any questions or concerns, please don't hesitate to call.        Sincerely,           [facility]  Electronically Signed

## 2021-02-23 ENCOUNTER — HOSPITAL ENCOUNTER (OUTPATIENT)
Facility: MEDICAL CENTER | Age: 21
End: 2021-02-23
Attending: PHYSICIAN ASSISTANT
Payer: MEDICAID

## 2021-02-23 ENCOUNTER — OFFICE VISIT (OUTPATIENT)
Dept: URGENT CARE | Facility: PHYSICIAN GROUP | Age: 21
End: 2021-02-23
Payer: MEDICAID

## 2021-02-23 VITALS
HEIGHT: 64 IN | OXYGEN SATURATION: 99 % | TEMPERATURE: 98.7 F | BODY MASS INDEX: 25.64 KG/M2 | WEIGHT: 150.2 LBS | DIASTOLIC BLOOD PRESSURE: 70 MMHG | HEART RATE: 114 BPM | SYSTOLIC BLOOD PRESSURE: 112 MMHG | RESPIRATION RATE: 18 BRPM

## 2021-02-23 DIAGNOSIS — R68.89 FLU-LIKE SYMPTOMS: ICD-10-CM

## 2021-02-23 DIAGNOSIS — R19.7 NAUSEA VOMITING AND DIARRHEA: ICD-10-CM

## 2021-02-23 DIAGNOSIS — R11.2 NAUSEA VOMITING AND DIARRHEA: ICD-10-CM

## 2021-02-23 PROCEDURE — 99214 OFFICE O/P EST MOD 30 MIN: CPT | Performed by: PHYSICIAN ASSISTANT

## 2021-02-23 PROCEDURE — U0005 INFEC AGEN DETEC AMPLI PROBE: HCPCS

## 2021-02-23 PROCEDURE — U0003 INFECTIOUS AGENT DETECTION BY NUCLEIC ACID (DNA OR RNA); SEVERE ACUTE RESPIRATORY SYNDROME CORONAVIRUS 2 (SARS-COV-2) (CORONAVIRUS DISEASE [COVID-19]), AMPLIFIED PROBE TECHNIQUE, MAKING USE OF HIGH THROUGHPUT TECHNOLOGIES AS DESCRIBED BY CMS-2020-01-R: HCPCS

## 2021-02-23 RX ORDER — ONDANSETRON 4 MG/1
4 TABLET, ORALLY DISINTEGRATING ORAL EVERY 8 HOURS PRN
Qty: 20 TABLET | Refills: 0 | Status: SHIPPED | OUTPATIENT
Start: 2021-02-23 | End: 2021-05-27

## 2021-02-23 NOTE — LETTER
February 23, 2021         Patient: Vita Giordano   YOB: 2000   Date of Visit: 2/23/2021           To Whom it May Concern:    Vita Giordano was seen in my clinic on 2/23/2021.     Please excuse patient for missing school/work until COVID results are available, typically taking 1-3 days.  They have been advised to quarantine during this time.      If you have any questions or concerns, please don't hesitate to call.        Sincerely,           Dayan Lea P.A.-C.  Electronically Signed

## 2021-02-24 LAB
COVID ORDER STATUS COVID19: NORMAL
SARS-COV-2 RNA RESP QL NAA+PROBE: NOTDETECTED
SPECIMEN SOURCE: NORMAL

## 2021-02-24 NOTE — PROGRESS NOTES
Chief Complaint   Patient presents with   • Fever   • Headache   • Cough   • Body Aches   • Nausea   • Nasal Congestion     stuffy or runny       HISTORY OF PRESENT ILLNESS: Patient is a 20 y.o. female who presents today for the following:    Cough x 1 week  + HA, nasal congestion  Last 2-3 days: N/V/D; above symptoms have improved  + fever, body aches, fatigue    Patient Active Problem List    Diagnosis Date Noted   • Moderate episode of recurrent major depressive disorder (HCC) 04/30/2019   • History of depression 04/29/2019       Allergies:Norco [hydrocodone-acetaminophen]    Current Outpatient Medications Ordered in Epic   Medication Sig Dispense Refill   • ondansetron (ZOFRAN ODT) 4 MG TABLET DISPERSIBLE Take 1 tablet by mouth every 8 hours as needed for Nausea. 20 tablet 0   • Etonogestrel (NEXPLANON SC) Inject  as instructed.       No current [facility]-ordered facility-administered medications on file.       Past Medical History:   Diagnosis Date   • Depression        Social History     Tobacco Use   • Smoking status: Never Smoker   • Smokeless tobacco: Never Used   Substance Use Topics   • Alcohol use: No   • Drug use: No       Family Status   Relation Name Status   • Mo  (Not Specified)   • Fa  (Not Specified)   • MGFa  (Not Specified)     Family History   Problem Relation Age of Onset   • Depression Mother    • Other Mother         hydrocephalus   • Heart Disease Father    • Depression Father    • Diabetes Maternal Grandfather        Review of Systems:   Constitutional ROS: No unexpected change in weight, No weakness, No fatigue  Eye ROS: No recent significant change in vision, No eye pain, redness, discharge  Ear ROS: No drainage, No tinnitus or vertigo, No recent change in hearing  Mouth/Throat ROS: No teeth or gum problems, No bleeding gums, No tongue complaints  Neck ROS: No swollen glands, No significant pain in neck  Pulmonary ROS: No chronic cough, sputum, or hemoptysis, No dyspnea on exertion, No  "wheezing  Cardiovascular ROS: No diaphoresis, No edema, No palpitations  Musculoskeletal/Extremities ROS: No peripheral edema, No pain, redness or swelling on the joints  Hematologic/Lymphatic ROS: + body aches, fever  Skin/Integumentary ROS: No edema, No evidence of rash, No itching      Exam:  /70   Pulse (!) 114   Temp 37.1 °C (98.7 °F) (Temporal)   Resp 18   Ht 1.626 m (5' 4\")   Wt 68.1 kg (150 lb 3.2 oz)   SpO2 99%   General: Well developed, well nourished. No distress.    Eye: PERRL/EOMI; conjunctivae clear, lids normal.  ENMT: Lips without lesions, MMM. Oropharynx is clear. Bilateral TMs are within normal limits.  Pulmonary: Unlabored respiratory effort. Lungs clear to auscultation, no wheezes, no rhonchi.    Cardiovascular: Regular rate and rhythm without murmur.   Neurologic: Grossly nonfocal. No facial asymmetry noted.  Lymph: No cervical lymphadenopathy noted.  Skin: Warm, dry, good turgor. No rashes in visible areas.   Psych: Normal mood. Alert and oriented to person, place and time.    Assessment/Plan:  Discussed likely viral etiology.  Vitals and exam are unremarkable.  Low suspicion for pneumonia.  Discussed appropriate over-the-counter symptomatic medication, and when to return to clinic. Follow up for worsening or persistent symptoms.  1. Flu-like symptoms  SARS-CoV-2, PCR (In-House): Collect NP OR nasal swab in [facility]   2. Nausea vomiting and diarrhea  ondansetron (ZOFRAN ODT) 4 MG TABLET DISPERSIBLE       "

## 2021-04-14 ENCOUNTER — OFFICE VISIT (OUTPATIENT)
Dept: URGENT CARE | Facility: PHYSICIAN GROUP | Age: 21
End: 2021-04-14
Payer: MEDICAID

## 2021-04-14 VITALS
TEMPERATURE: 97.3 F | OXYGEN SATURATION: 99 % | BODY MASS INDEX: 25.95 KG/M2 | DIASTOLIC BLOOD PRESSURE: 70 MMHG | HEART RATE: 74 BPM | WEIGHT: 152 LBS | SYSTOLIC BLOOD PRESSURE: 114 MMHG | HEIGHT: 64 IN | RESPIRATION RATE: 16 BRPM

## 2021-04-14 DIAGNOSIS — L08.9 SKIN INFECTION: ICD-10-CM

## 2021-04-14 PROCEDURE — 99213 OFFICE O/P EST LOW 20 MIN: CPT | Performed by: PHYSICIAN ASSISTANT

## 2021-04-14 RX ORDER — CEPHALEXIN 500 MG/1
500 CAPSULE ORAL 2 TIMES DAILY
Qty: 10 CAPSULE | Refills: 0 | Status: SHIPPED | OUTPATIENT
Start: 2021-04-14 | End: 2021-04-19

## 2021-04-14 ASSESSMENT — ENCOUNTER SYMPTOMS
NAUSEA: 0
EYE PAIN: 0
SHORTNESS OF BREATH: 0
SORE THROAT: 0
VOMITING: 0
CONSTIPATION: 0
ABDOMINAL PAIN: 0
DIARRHEA: 0
HEADACHES: 0
MYALGIAS: 0
COUGH: 0
FEVER: 0
CHILLS: 0

## 2021-04-14 NOTE — PROGRESS NOTES
Subjective:   Vita Giordano is a 21 y.o. female who presents for Bug Bite (on back of leg, itches, very swollen and painful, causing leg pain (L) leg )      HPI:  This is a pleasant 21-year-old female who 4 days ago was [place].  Her leg began to itch that evening and she noticed a small bite that evening in the shower.  She is sure that there was not a tick attached for greater than 24 hours.  She was in an area where there is no ticks to be found.  She has been using a topical diphenhydramine and notes that it continues to be itchy and is actually, a little bit more swollen and red and she is concerned about it becoming secondarily infected    Review of Systems   Constitutional: Negative for chills and fever.   HENT: Negative for congestion, ear pain and sore throat.    Eyes: Negative for pain.   Respiratory: Negative for cough and shortness of breath.    Cardiovascular: Negative for chest pain.   Gastrointestinal: Negative for abdominal pain, constipation, diarrhea, nausea and vomiting.   Genitourinary: Negative for dysuria.   Musculoskeletal: Negative for myalgias.   Skin: Positive for itching.   Neurological: Negative for headaches.       Medications:    • cephALEXin Caps  • NEXPLANON SC  • ondansetron Tbdp    Allergies: Norco [hydrocodone-acetaminophen]    Problem List: Vita Giordano has History of depression and Moderate episode of recurrent major depressive disorder (HCC) on their problem list.    Surgical History:  Past Surgical History:   Procedure Laterality Date   • KNEE ARTHROSCOPY Left    • OTHER      oral, wisdom teeth pulled   • TONSILLECTOMY         Past Social Hx: Vita Giordano  reports that she has never smoked. She has never used smokeless tobacco. She reports that she does not drink alcohol and does not use drugs.     Past Family Hx:  Vita Giordano family history includes Depression in her father and mother; Diabetes in her maternal grandfather; Heart Disease in her father; Other in her  "mother.     Problem list, medications, and allergies reviewed by myself today in Epic.     Objective:     /70   Pulse 74   Temp 36.3 °C (97.3 °F)   Resp 16   Ht 1.626 m (5' 4\")   Wt 68.9 kg (152 lb)   SpO2 99%   BMI 26.09 kg/m²     Physical Exam  Vitals reviewed.   Constitutional:       Appearance: Normal appearance.   HENT:      Head: Normocephalic and atraumatic.      Right Ear: External ear normal.      Left Ear: External ear normal.      Nose: Nose normal.      Mouth/Throat:      Mouth: Mucous membranes are moist.   Eyes:      Conjunctiva/sclera: Conjunctivae normal.   Cardiovascular:      Rate and Rhythm: Normal rate.   Pulmonary:      Effort: Pulmonary effort is normal.   Skin:     General: Skin is warm and dry.      Capillary Refill: Capillary refill takes less than 2 seconds.      Comments: Patch of erythema with central induration consistent with arthropod bite on the posterior aspect of the left calf.  Trace surrounding erythema and warmth.  No palpable fluctuance.  No streaking lymphangitis.   Neurological:      Mental Status: She is alert and oriented to person, place, and time.         Assessment/Plan:     Diagnosis and associated orders:     1. Skin infection  cephALEXin (KEFLEX) 500 MG Cap      Comments/MDM:     • History and physical are equivocal for secondary bacterial infection.  I recommended the patient take a oral antihistamine, use a topical steroid, and continue use of topical diphenhydramine and if she trends towards worsening symptoms or has a failure to improve in 24 to 48 hours she can initiate Keflex.  No signs of sepsis or more severe systemic illness at this point.  I recommended return precautions as well as ER precautions for any fever or worsening constitutional symptoms         Differential diagnosis, natural history, supportive care, and indications for immediate follow-up discussed.    Advised the patient to follow-up with the primary care physician for recheck, " reevaluation, and consideration of further management.    Please note that this dictation was created using voice recognition software. I have made a reasonable attempt to correct obvious errors, but I expect that there are errors of grammar and possibly content that I did not discover before finalizing the note.    This note was electronically signed by Steve Daly PA-C

## 2021-05-27 ENCOUNTER — OFFICE VISIT (OUTPATIENT)
Dept: MEDICAL GROUP | Facility: CLINIC | Age: 21
End: 2021-05-27
Payer: MEDICAID

## 2021-05-27 ENCOUNTER — HOSPITAL ENCOUNTER (OUTPATIENT)
Facility: MEDICAL CENTER | Age: 21
End: 2021-05-27
Attending: PHYSICIAN ASSISTANT
Payer: MEDICAID

## 2021-05-27 VITALS
OXYGEN SATURATION: 98 % | SYSTOLIC BLOOD PRESSURE: 122 MMHG | BODY MASS INDEX: 25.92 KG/M2 | WEIGHT: 151.8 LBS | TEMPERATURE: 98.2 F | RESPIRATION RATE: 16 BRPM | HEART RATE: 85 BPM | DIASTOLIC BLOOD PRESSURE: 68 MMHG | HEIGHT: 64 IN

## 2021-05-27 DIAGNOSIS — Z11.9 SCREENING EXAMINATION FOR INFECTIOUS DISEASE: ICD-10-CM

## 2021-05-27 DIAGNOSIS — R68.89 COLD INTOLERANCE: ICD-10-CM

## 2021-05-27 DIAGNOSIS — L65.9 HAIR LOSS: ICD-10-CM

## 2021-05-27 DIAGNOSIS — F33.1 MODERATE EPISODE OF RECURRENT MAJOR DEPRESSIVE DISORDER (HCC): ICD-10-CM

## 2021-05-27 DIAGNOSIS — R53.82 CHRONIC FATIGUE: ICD-10-CM

## 2021-05-27 DIAGNOSIS — J30.2 SEASONAL ALLERGIES: ICD-10-CM

## 2021-05-27 DIAGNOSIS — L85.3 DRY SKIN: ICD-10-CM

## 2021-05-27 DIAGNOSIS — F41.9 ANXIETY: ICD-10-CM

## 2021-05-27 DIAGNOSIS — Z00.00 BLOOD TESTS FOR ROUTINE GENERAL PHYSICAL EXAMINATION: ICD-10-CM

## 2021-05-27 DIAGNOSIS — Z78.9 USES CONTRACEPTIVE IMPLANT FOR BIRTH CONTROL: ICD-10-CM

## 2021-05-27 DIAGNOSIS — Z86.2 HISTORY OF ANEMIA: ICD-10-CM

## 2021-05-27 DIAGNOSIS — E55.9 VITAMIN D DEFICIENCY: ICD-10-CM

## 2021-05-27 PROBLEM — Z86.59 HISTORY OF DEPRESSION: Status: RESOLVED | Noted: 2019-04-29 | Resolved: 2021-05-27

## 2021-05-27 PROCEDURE — 99000 SPECIMEN HANDLING OFFICE-LAB: CPT | Performed by: PHYSICIAN ASSISTANT

## 2021-05-27 PROCEDURE — 99214 OFFICE O/P EST MOD 30 MIN: CPT | Performed by: PHYSICIAN ASSISTANT

## 2021-05-27 PROCEDURE — 84443 ASSAY THYROID STIM HORMONE: CPT

## 2021-05-27 PROCEDURE — 82306 VITAMIN D 25 HYDROXY: CPT

## 2021-05-27 PROCEDURE — 84439 ASSAY OF FREE THYROXINE: CPT

## 2021-05-27 PROCEDURE — 85025 COMPLETE CBC W/AUTO DIFF WBC: CPT

## 2021-05-27 PROCEDURE — 84480 ASSAY TRIIODOTHYRONINE (T3): CPT

## 2021-05-27 PROCEDURE — 87591 N.GONORRHOEAE DNA AMP PROB: CPT

## 2021-05-27 PROCEDURE — 80053 COMPREHEN METABOLIC PANEL: CPT

## 2021-05-27 PROCEDURE — 87491 CHLMYD TRACH DNA AMP PROBE: CPT

## 2021-05-27 RX ORDER — LORATADINE 10 MG/1
10 TABLET ORAL DAILY
COMMUNITY
End: 2021-06-13

## 2021-05-27 ASSESSMENT — PATIENT HEALTH QUESTIONNAIRE - PHQ9
5. POOR APPETITE OR OVEREATING: SEVERAL DAYS
2. FEELING DOWN, DEPRESSED, IRRITABLE, OR HOPELESS: SEVERAL DAYS
SUM OF ALL RESPONSES TO PHQ QUESTIONS 1-9: 10
6. FEELING BAD ABOUT YOURSELF - OR THAT YOU ARE A FAILURE OR HAVE LET YOURSELF OR YOUR FAMILY DOWN: SEVERAL DAYS
7. TROUBLE CONCENTRATING ON THINGS, SUCH AS READING THE NEWSPAPER OR WATCHING TELEVISION: SEVERAL DAYS
3. TROUBLE FALLING OR STAYING ASLEEP OR SLEEPING TOO MUCH: NOT AT ALL
9. THOUGHTS THAT YOU WOULD BE BETTER OFF DEAD, OR OF HURTING YOURSELF: NOT AT ALL
SUM OF ALL RESPONSES TO PHQ9 QUESTIONS 1 AND 2: 3
8. MOVING OR SPEAKING SO SLOWLY THAT OTHER PEOPLE COULD HAVE NOTICED. OR THE OPPOSITE, BEING SO FIGETY OR RESTLESS THAT YOU HAVE BEEN MOVING AROUND A LOT MORE THAN USUAL: SEVERAL DAYS
1. LITTLE INTEREST OR PLEASURE IN DOING THINGS: MORE THAN HALF THE DAYS
4. FEELING TIRED OR HAVING LITTLE ENERGY: NEARLY EVERY DAY

## 2021-05-27 NOTE — LETTER
[facility]  Isa Santoyo P.A.-C.  [address] [address] [address]  [facility] [address]  Fax: [phone]   Authorization for Release/Disclosure of   Protected Health Information   Name: ROBERTO REDE : 2000 SSN: [id number]   Address: [address] [place] [address] Phone:    [phone] (home)    I authorize the entity listed below to release/disclose the PHI below to:   [facility]/Isa Santoyo P.A.-C. and Isa Santoyo P.A.-C.   Provider or Entity Name:  [facility]   Address   [facility], [address]  [address] [place] [address] Phone:  [phone]    Fax:     Reason for request: continuity of care   Information to be released:    [  ] LAST COLONOSCOPY,  including any PATH REPORT and follow-up  [  ] LAST FIT/COLOGUARD RESULT [  ] LAST DEXA  [  ] LAST MAMMOGRAM  [XX] LAST PAP  [XX] LAST LABS [  ] RETINA EXAM REPORT  [  ] IMMUNIZATION RECORDS  [  ] Release all info      [  ] Check here and initial the line next to each item to release ALL health information INCLUDING  _____ Care and treatment for drug and / or alcohol abuse  _____ HIV testing, infection status, or AIDS  _____ Genetic Testing    DATES OF SERVICE OR TIME PERIOD TO BE DISCLOSED: _2019 to present___  I understand and acknowledge that:  * This Authorization may be revoked at any time by you in writing, except if your health information has already been used or disclosed.  * Your health information that will be used or disclosed as a result of you signing this authorization could be re-disclosed by the recipient. If this occurs, your re-disclosed health information may no longer be protected by State or Federal laws.  * You may refuse to sign this Authorization. Your refusal will not affect your ability to obtain treatment.  * This Authorization becomes effective upon signing and will  on (date) __________.      If no date is indicated, this Authorization will  one  (1) year from the signature date.    Name: Vita Giordano    Signature:   Date:     5/27/2021       PLEASE FAX REQUESTED RECORDS BACK TO: [phone]

## 2021-05-27 NOTE — PROGRESS NOTES
Chief Complaint   Patient presents with   • Hypothyroidism     possible, tired, hot flashes, dizziness   • Labs Only       HISTORY OF PRESENT ILLNESS: Patient is a 21 y.o. female established patient who presents today to discuss the following issues:    Chronic fatigue  Severe fatigue over last month. Was seen by her OB/GYN and told to follow up with PCP for thyroid testing due to symptoms of dry skin, fatigue, hair loss, anxiety, cold intolerance and abnormal physical exam for thyroid.  Labs ordered we will follow-up in 1 week with results.    Moderate episode of recurrent major depressive disorder (HCC)  This is a chronic health problem that is uncontrolled with current medications and lifestyle measures.  Patient's GYN believes that her depression symptoms are being worsened by hormone imbalance.  Patient would like to hold off starting any depression medication until gynecology gets her hormones under better control.    Seasonal allergies  The patient's chronic condition is well controlled on the current therapy with no new symptoms or worsening.  Patient taking loratadine 10 mg daily with good control of symptoms.      Patient Active Problem List    Diagnosis Date Noted   • Uses contraceptive implant for birth control 05/27/2021   • Anxiety 05/27/2021   • Chronic fatigue 05/27/2021   • Dry skin 05/27/2021   • Hair loss 05/27/2021   • Cold intolerance 05/27/2021   • Seasonal allergies 05/27/2021   • Moderate episode of recurrent major depressive disorder (HCC) 04/30/2019       Allergies:Norco [hydrocodone-acetaminophen]    Current Outpatient Medications   Medication Sig Dispense Refill   • etonogestrel (NEXPLANON) 68 MG Implant implant Inject 1 Each under the skin.     • loratadine (CLARITIN) 10 MG Tab Take 10 mg by mouth every day.       No current facility-administered medications for this visit.       Hospital Outpatient Visit on 02/23/2021   Component Date Value Ref Range Status   • SARS-CoV-2 Source  "02/23/2021 Nasal Swab   Final   • SARS-CoV-2 by PCR 02/23/2021 NotDetected   Final    Comment: PATIENTS: Important information regarding your results and instructions can  be found at [email or web address]   \"After your  Covid-19 Test\"  [facility] providers: PLEASE REFER TO DE-ESCALATION AND RETESTING PROTOCOL  on [email or web address]  **The TaqPath COVID-19 SARS-CoV-2 test has been made available for use under  the Emergency Use Authorization (EUA) only.     • COVID Order Status 02/23/2021 Received   Final    Comment: The order for SARS CoV-2 testing has been received by the  Laboratory. This result is neither positive nor negative.  Final results of testing will report separately.     ]    The ASCVD Risk score (Kemar MANCIA Jr., et al., 2013) failed to calculate for the following reasons:    The 2013 ASCVD risk score is only valid for ages 40 to 79    Social History     Tobacco Use   • Smoking status: Never Smoker   • Smokeless tobacco: Never Used   Vaping Use   • Vaping Use: Never used   Substance Use Topics   • Alcohol use: No     Comment: rarely - 1-2 a year   • Drug use: Yes     Types: Marijuana, Inhaled, Oral     Comment: Occasional       Family Status   Relation Name Status   • Mo  (Not Specified)   • Fa  (Not Specified)   • MGFa  (Not Specified)     Family History   Problem Relation Age of Onset   • Depression Mother    • Other Mother         hydrocephalus arnold-Chiari malformation   • Psychiatric Illness Mother         anxiety and depression   • Seizures Mother         epilepsy   • Diabetes Mother    • Hypertension Mother    • Migraines Mother    • Depression Father    • Allergies Father    • Psychiatric Illness Father         depression and bipolar   • Other Father         GERD   • Diabetes Maternal Grandfather    • Heart Disease Maternal Grandfather    • Hypertension Maternal Grandfather    • Hyperlipidemia Maternal Grandfather    • Other Sister         IBS   • Psychiatric Illness Sister  "        anxiety and depression   • Migraines Sister    • Cancer Maternal Aunt         pancreatic   • No Known Problems Maternal Grandmother    • No Known Problems Paternal Grandmother    • Heart Disease Paternal Grandfather    • No Known Problems Sister        No LMP recorded.    Health Maintenance Summary                IMM VARICELLA (CHICKENPOX) VACCINE Overdue 3/20/1999     IMM HPV VACCINE Overdue 3/20/2009     COVID-19 Vaccine Overdue 3/20/2010     IMM MENINGOCOCCAL B VACCINE HEALTHY PATIENTS AGED 16 TO 23 Overdue 3/20/2014     IMM DTaP/Tdap/Td Vaccine Overdue 3/20/2017     PAP SMEAR Overdue 3/20/2019     CHLAMYDIA SCREENING Overdue 7/8/2020      Done 7/8/2019 CHLAMYDIA/GC PCR URINE OR SWAB     Patient has more history with this topic...    IMM INFLUENZA Next Due 9/1/2021            Review of Systems:   Constitutional: Positive for fatigue.  Negative for fever, chills, weight change, loss of appetite.  HNT: Negative for nosebleeds, congestion, odynophagia, sore throat or changes in taste.    Eyes: Negative for vision changes.   Ears: Negative for recent changes in hearing, pain or discharge.  Neck: Negative for pain, swelling, lumps or goiter.  Respiratory: Negative for cough, sputum production, shortness of breath and wheezing.    Cardiovascular: Negative for chest pain, palpitations, orthopnea and leg swelling.   Gastrointestinal: Negative for constipation, diarrhea, heartburn, dysphagia, nausea, vomiting or abdominal pain.   Genitourinary: Negative for dysuria, urgency and frequency.   Musculoskeletal: Negative for myalgias, joint pain, and back pain.  Skin: Positive for dry skin, hair loss.  Negative for other skin, hair or nail changes, rash, itching.   Neurological: Negative for dizziness, tingling, tremors, sensory change, gait/coordination changes, focal weakness and headaches.   Endo/Heme/Allergies: Does not bruise/bleed easily.   Psychiatric/Behavioral: Negative for depression, suicidal ideas and memory  "loss.  The patient is not nervous/anxious and does not have insomnia.        Exam:  /68 (BP Location: Right arm, Patient Position: Sitting, BP Cuff Size: Adult)   Pulse 85   Temp 36.8 °C (98.2 °F) (Temporal)   Resp 16   Ht 1.626 m (5' 4\")   Wt 68.9 kg (151 lb 12.8 oz)   SpO2 98%  Body mass index is 26.06 kg/m².  General:  Well nourished, well developed female. Body mass index is 26.06 kg/m². No apparent distress.  Eyes: EOM intact, PERRL, conjunctiva non-injected, sclera non-icteric.  Neck: Supple with no cervical lymphadenopathy, JVD, palpable thyroid nodules or carotid bruits.  Pulmonary: Clear to ausculation bilaterally. Normal effort. No rales, ronchi, or wheezing.  Cardiovascular: Regular rate and rhythm without murmur, rub or gallop.   Extremities: Full range of motion. Warm and well perfused with no edema.  Skin: Intact with no obvious rashes or lesions.  Neuro: Cranial nerves I-XII intact.  Psych: Alert and oriented x 3.  Appropriately dressed. Mood and affect appropriate.      Assessment/Plan:  1. Uses contraceptive implant for birth control     2. Moderate episode of recurrent major depressive disorder (HCC)     3. Anxiety  FREE THYROXINE    TSH    TRIIDOTHYRONINE   4. Chronic fatigue  FREE THYROXINE    TSH    TRIIDOTHYRONINE   5. Dry skin  FREE THYROXINE    TSH    TRIIDOTHYRONINE   6. Hair loss  FREE THYROXINE    TSH    TRIIDOTHYRONINE   7. Cold intolerance  FREE THYROXINE    TSH    TRIIDOTHYRONINE   8. History of anemia  CBC WITH DIFFERENTIAL   9. Vitamin D deficiency  VITAMIN D,25 HYDROXY   10. Blood tests for routine general physical examination  Comp Metabolic Panel   11. Screening examination for infectious disease  Chlamydia/GC PCR Urine Or Swab (Clinic Collect - Urine)   12. Seasonal allergies         Reviewed risks and benefits of treatment plan. Patient verbally agrees to plan of care.     Return in about 1 week (around 6/3/2021) for f/u labs.    Please note that this dictation was " created using voice recognition software. I have made every reasonable attempt to correct obvious errors, but I expect that there are errors of grammar and possibly content that I did not discover before finalizing the note.

## 2021-05-27 NOTE — ASSESSMENT & PLAN NOTE
Severe fatigue over last month. Was seen by her OB/GYN and told to follow up with PCP for thyroid testing due to symptoms of dry skin, fatigue, hair loss, anxiety, cold intolerance and abnormal physical exam for thyroid.  Labs ordered we will follow-up in 1 week with results.

## 2021-05-27 NOTE — LETTER
[facility]  Isa Santoyo P.A.-C.  [address]  [place] [address]  Fax: [phone]   Authorization for Release/Disclosure of   Protected Health Information   Name: ROBERTO REED : 2000 SSN: [id number]   Address: [address] [place] [address]  [place] [address] Phone:    [phone] (home)    I authorize the entity listed below to release/disclose the PHI below to:   [facility]/Isa Santoyo P.A.-C. and Isa Santoyo P.A.-C.   Provider or Entity Name:  [facility]    Address   City, [facility] Phone:      Fax:     Reason for request: continuity of care   Information to be released:    [  ] LAST COLONOSCOPY,  including any PATH REPORT and follow-up  [  ] LAST FIT/COLOGUARD RESULT [  ] LAST DEXA  [  ] LAST MAMMOGRAM  [ x ] LAST PAP  [  ] LAST LABS [  ] RETINA EXAM REPORT  [  ] IMMUNIZATION RECORDS  [  ] Release all info      [  ] Check here and initial the line next to each item to release ALL health information INCLUDING  _____ Care and treatment for drug and / or alcohol abuse  _____ HIV testing, infection status, or AIDS  _____ Genetic Testing    DATES OF SERVICE OR TIME PERIOD TO BE DISCLOSED: _____________  I understand and acknowledge that:  * This Authorization may be revoked at any time by you in writing, except if your health information has already been used or disclosed.  * Your health information that will be used or disclosed as a result of you signing this authorization could be re-disclosed by the recipient. If this occurs, your re-disclosed health information may no longer be protected by State or Federal laws.  * You may refuse to sign this Authorization. Your refusal will not affect your ability to obtain treatment.  * This Authorization becomes effective upon signing and will  on (date) __________.      If no date is indicated, this Authorization will  one (1) year from the signature date.    Name: Roberto  Giuliana    Signature:   Date:     5/27/2021       PLEASE FAX REQUESTED RECORDS BACK TO: [phone]

## 2021-05-28 DIAGNOSIS — L65.9 HAIR LOSS: ICD-10-CM

## 2021-05-28 DIAGNOSIS — R68.89 COLD INTOLERANCE: ICD-10-CM

## 2021-05-28 DIAGNOSIS — R53.82 CHRONIC FATIGUE: ICD-10-CM

## 2021-05-28 DIAGNOSIS — Z00.00 BLOOD TESTS FOR ROUTINE GENERAL PHYSICAL EXAMINATION: ICD-10-CM

## 2021-05-28 DIAGNOSIS — Z86.2 HISTORY OF ANEMIA: ICD-10-CM

## 2021-05-28 DIAGNOSIS — F41.9 ANXIETY: ICD-10-CM

## 2021-05-28 DIAGNOSIS — E55.9 VITAMIN D DEFICIENCY: ICD-10-CM

## 2021-05-28 DIAGNOSIS — L85.3 DRY SKIN: ICD-10-CM

## 2021-05-28 DIAGNOSIS — Z11.9 SCREENING EXAMINATION FOR INFECTIOUS DISEASE: ICD-10-CM

## 2021-05-28 LAB
ALBUMIN SERPL BCP-MCNC: 4.4 G/DL (ref 3.2–4.9)
ALBUMIN/GLOB SERPL: 1.6 G/DL
ALP SERPL-CCNC: 45 U/L (ref 30–99)
ALT SERPL-CCNC: 15 U/L (ref 2–50)
ANION GAP SERPL CALC-SCNC: 9 MMOL/L (ref 7–16)
AST SERPL-CCNC: 20 U/L (ref 12–45)
BASOPHILS # BLD AUTO: 1.2 % (ref 0–1.8)
BASOPHILS # BLD: 0.07 K/UL (ref 0–0.12)
BILIRUB SERPL-MCNC: 0.4 MG/DL (ref 0.1–1.5)
BUN SERPL-MCNC: 12 MG/DL (ref 8–22)
CALCIUM SERPL-MCNC: 8.7 MG/DL (ref 8.5–10.5)
CHLORIDE SERPL-SCNC: 107 MMOL/L (ref 96–112)
CO2 SERPL-SCNC: 23 MMOL/L (ref 20–33)
CREAT SERPL-MCNC: 0.66 MG/DL (ref 0.5–1.4)
EOSINOPHIL # BLD AUTO: 0.09 K/UL (ref 0–0.51)
EOSINOPHIL NFR BLD: 1.6 % (ref 0–6.9)
ERYTHROCYTE [DISTWIDTH] IN BLOOD BY AUTOMATED COUNT: 39.2 FL (ref 35.9–50)
GLOBULIN SER CALC-MCNC: 2.8 G/DL (ref 1.9–3.5)
GLUCOSE SERPL-MCNC: 84 MG/DL (ref 65–99)
HCT VFR BLD AUTO: 42.6 % (ref 37–47)
HGB BLD-MCNC: 14.3 G/DL (ref 12–16)
IMM GRANULOCYTES # BLD AUTO: 0.01 K/UL (ref 0–0.11)
IMM GRANULOCYTES NFR BLD AUTO: 0.2 % (ref 0–0.9)
LYMPHOCYTES # BLD AUTO: 1.59 K/UL (ref 1–4.8)
LYMPHOCYTES NFR BLD: 27.8 % (ref 22–41)
MCH RBC QN AUTO: 29.7 PG (ref 27–33)
MCHC RBC AUTO-ENTMCNC: 33.6 G/DL (ref 33.6–35)
MCV RBC AUTO: 88.6 FL (ref 81.4–97.8)
MONOCYTES # BLD AUTO: 0.28 K/UL (ref 0–0.85)
MONOCYTES NFR BLD AUTO: 4.9 % (ref 0–13.4)
NEUTROPHILS # BLD AUTO: 3.67 K/UL (ref 2–7.15)
NEUTROPHILS NFR BLD: 64.3 % (ref 44–72)
NRBC # BLD AUTO: 0 K/UL
NRBC BLD-RTO: 0 /100 WBC
PLATELET # BLD AUTO: 305 K/UL (ref 164–446)
PMV BLD AUTO: 10.6 FL (ref 9–12.9)
POTASSIUM SERPL-SCNC: 4.2 MMOL/L (ref 3.6–5.5)
PROT SERPL-MCNC: 7.2 G/DL (ref 6–8.2)
RBC # BLD AUTO: 4.81 M/UL (ref 4.2–5.4)
SODIUM SERPL-SCNC: 139 MMOL/L (ref 135–145)
T3 SERPL-MCNC: 118 NG/DL (ref 60–181)
T4 FREE SERPL-MCNC: 1.33 NG/DL (ref 0.93–1.7)
TSH SERPL DL<=0.005 MIU/L-ACNC: 0.81 UIU/ML (ref 0.38–5.33)
WBC # BLD AUTO: 5.7 K/UL (ref 4.8–10.8)

## 2021-05-28 NOTE — ASSESSMENT & PLAN NOTE
This is a chronic health problem that is uncontrolled with current medications and lifestyle measures.  Patient's GYN believes that her depression symptoms are being worsened by hormone imbalance.  Patient would like to hold off starting any depression medication until gynecology gets her hormones under better control.

## 2021-05-28 NOTE — ASSESSMENT & PLAN NOTE
The patient's chronic condition is well controlled on the current therapy with no new symptoms or worsening.  Patient taking loratadine 10 mg daily with good control of symptoms.

## 2021-05-29 LAB
C TRACH DNA SPEC QL NAA+PROBE: NEGATIVE
N GONORRHOEA DNA SPEC QL NAA+PROBE: NEGATIVE
SPECIMEN SOURCE: NORMAL

## 2021-05-31 LAB — 25(OH)D3 SERPL-MCNC: 24 NG/ML (ref 30–80)

## 2021-06-03 ENCOUNTER — OFFICE VISIT (OUTPATIENT)
Dept: MEDICAL GROUP | Facility: CLINIC | Age: 21
End: 2021-06-03
Payer: MEDICAID

## 2021-06-03 VITALS
WEIGHT: 151.2 LBS | RESPIRATION RATE: 16 BRPM | HEIGHT: 64 IN | TEMPERATURE: 98.5 F | SYSTOLIC BLOOD PRESSURE: 118 MMHG | BODY MASS INDEX: 25.81 KG/M2 | OXYGEN SATURATION: 99 % | HEART RATE: 78 BPM | DIASTOLIC BLOOD PRESSURE: 68 MMHG

## 2021-06-03 DIAGNOSIS — R53.82 CHRONIC FATIGUE: ICD-10-CM

## 2021-06-03 DIAGNOSIS — E55.9 VITAMIN D DEFICIENCY: ICD-10-CM

## 2021-06-03 PROCEDURE — 99214 OFFICE O/P EST MOD 30 MIN: CPT | Performed by: PHYSICIAN ASSISTANT

## 2021-06-03 RX ORDER — CHOLECALCIFEROL (VITAMIN D3) 125 MCG
1 CAPSULE ORAL DAILY
Qty: 90 TABLET | Refills: 3 | Status: SHIPPED | OUTPATIENT
Start: 2021-06-03 | End: 2021-06-13

## 2021-06-03 ASSESSMENT — FIBROSIS 4 INDEX: FIB4 SCORE: 0.39

## 2021-06-03 NOTE — ASSESSMENT & PLAN NOTE
Patient here today for lab follow-up.  Still complaining of chronic fatigue.  Thyroid labs are within normal limits, no anemia, cmp normal.

## 2021-06-03 NOTE — PROGRESS NOTES
Chief Complaint   Patient presents with   • Lab Results     1 week follow up       HISTORY OF PRESENT ILLNESS: Patient is a 21 y.o. female established patient who presents today to discuss the following issues:    Chronic fatigue  Patient here today for lab follow-up.  Still complaining of chronic fatigue.  Thyroid labs are within normal limits, no anemia, cmp normal.    Vitamin D deficiency  This is a chronic health problem that is uncontrolled with current lifestyle measures. Has not been on a supplement lately. Level is low at 24. Restart supplement 2000 units daily and repeat labs in 1 year.      Patient Active Problem List    Diagnosis Date Noted   • Vitamin D deficiency 06/03/2021   • Uses contraceptive implant for birth control 05/27/2021   • Anxiety 05/27/2021   • Chronic fatigue 05/27/2021   • Dry skin 05/27/2021   • Hair loss 05/27/2021   • Cold intolerance 05/27/2021   • Seasonal allergies 05/27/2021   • Moderate episode of recurrent major depressive disorder (HCC) 04/30/2019       Allergies:Norco [hydrocodone-acetaminophen]    Current Outpatient Medications   Medication Sig Dispense Refill   • SPRINTEC 28 0.25-35 MG-MCG per tablet Take 1 tablet by mouth.     • Cholecalciferol (VITAMIN D3) 50 MCG (2000 UT) Tab Take 1 tablet by mouth every day. 90 tablet 3   • etonogestrel (NEXPLANON) 68 MG Implant implant Inject 1 Each under the skin.     • loratadine (CLARITIN) 10 MG Tab Take 10 mg by mouth every day.       No current facility-administered medications for this visit.       Hospital Outpatient Visit on 05/27/2021   Component Date Value Ref Range Status   • Free T-4 05/27/2021 1.33  0.93 - 1.70 ng/dL Final    Please note new FT4 reference range effective 4/29/2020.   • TSH 05/27/2021 0.810  0.380 - 5.330 uIU/mL Final    Comment: Please note new reference ranges effective 12/14/2017 10:00 AM    Pregnant Females, 1st Trimester  0.050-3.700  Pregnant Females, 2nd Trimester  0.310-4.350  Pregnant Females, 3rd  Trimester  0.410-5.180     • T3 05/27/2021 118.0  60.0 - 181.0 ng/dL Final   • Sodium 05/27/2021 139  135 - 145 mmol/L Final   • Potassium 05/27/2021 4.2  3.6 - 5.5 mmol/L Final   • Chloride 05/27/2021 107  96 - 112 mmol/L Final   • Co2 05/27/2021 23  20 - 33 mmol/L Final   • Anion Gap 05/27/2021 9.0  7.0 - 16.0 Final   • Glucose 05/27/2021 84  65 - 99 mg/dL Final   • Bun 05/27/2021 12  8 - 22 mg/dL Final   • Creatinine 05/27/2021 0.66  0.50 - 1.40 mg/dL Final   • Calcium 05/27/2021 8.7  8.5 - 10.5 mg/dL Final   • AST(SGOT) 05/27/2021 20  12 - 45 U/L Final   • ALT(SGPT) 05/27/2021 15  2 - 50 U/L Final   • Alkaline Phosphatase 05/27/2021 45  30 - 99 U/L Final   • Total Bilirubin 05/27/2021 0.4  0.1 - 1.5 mg/dL Final   • Albumin 05/27/2021 4.4  3.2 - 4.9 g/dL Final   • Total Protein 05/27/2021 7.2  6.0 - 8.2 g/dL Final   • Globulin 05/27/2021 2.8  1.9 - 3.5 g/dL Final   • A-G Ratio 05/27/2021 1.6  g/dL Final   • 25-Hydroxy   Vitamin D 25 05/27/2021 24* 30 - 80 ng/mL Final    Comment: INTERPRETIVE INFORMATION: Vitamin D, 25-Hydroxy  This assay accurately quantifies the sum of vitamin D3, 25-hydroxy  and vitamin D2, 25-hydroxy.  0-17 years:  Deficiency: less than 20 ng/mL  Optimum level: greater than or equal to 20 ng/mL*  *(Dudley CL et al. Pediatrics 2008; 122: 1142-52.)  18 years and older:  Deficiency: Less than 20 ng/mL  Insufficiency: 20-29 ng/mL  Optimum Level: 30-80 ng/mL  Possible Toxicity: Greater than 150 ng/mL  Performed By: [facility]  [address] [place] [id number]  : Snow Landis MD     • WBC 05/27/2021 5.7  4.8 - 10.8 K/uL Final   • RBC 05/27/2021 4.81  4.20 - 5.40 M/uL Final   • Hemoglobin 05/27/2021 14.3  12.0 - 16.0 g/dL Final   • Hematocrit 05/27/2021 42.6  37.0 - 47.0 % Final   • MCV 05/27/2021 88.6  81.4 - 97.8 fL Final   • MCH 05/27/2021 29.7  27.0 - 33.0 pg Final   • MCHC 05/27/2021 33.6  33.6 - 35.0 g/dL Final   • RDW 05/27/2021 39.2  35.9 - 50.0 fL Final  "  • Platelet Count 05/27/2021 305  164 - 446 K/uL Final   • MPV 05/27/2021 10.6  9.0 - 12.9 fL Final   • Neutrophils-Polys 05/27/2021 64.30  44.00 - 72.00 % Final   • Lymphocytes 05/27/2021 27.80  22.00 - 41.00 % Final   • Monocytes 05/27/2021 4.90  0.00 - 13.40 % Final   • Eosinophils 05/27/2021 1.60  0.00 - 6.90 % Final   • Basophils 05/27/2021 1.20  0.00 - 1.80 % Final   • Immature Granulocytes 05/27/2021 0.20  0.00 - 0.90 % Final   • Nucleated RBC 05/27/2021 0.00  /100 WBC Final   • Neutrophils (Absolute) 05/27/2021 3.67  2.00 - 7.15 K/uL Final    Includes immature neutrophils, if present.   • Lymphs (Absolute) 05/27/2021 1.59  1.00 - 4.80 K/uL Final   • Monos (Absolute) 05/27/2021 0.28  0.00 - 0.85 K/uL Final   • Eos (Absolute) 05/27/2021 0.09  0.00 - 0.51 K/uL Final   • Baso (Absolute) 05/27/2021 0.07  0.00 - 0.12 K/uL Final   • Immature Granulocytes (abs) 05/27/2021 0.01  0.00 - 0.11 K/uL Final   • NRBC (Absolute) 05/27/2021 0.00  K/uL Final   • C. trachomatis by PCR 05/27/2021 Negative  Negative Final   • N. gonorrhoeae by PCR 05/27/2021 Negative  Negative Final   • Source 05/27/2021 Urine   Final   • GFR If  05/27/2021 >60  >60 mL/min/1.73 m 2 Final   • GFR If Non  05/27/2021 >60  >60 mL/min/1.73 m 2 Final   Hospital Outpatient Visit on 02/23/2021   Component Date Value Ref Range Status   • SARS-CoV-2 Source 02/23/2021 Nasal Swab   Final   • SARS-CoV-2 by PCR 02/23/2021 NotDetected   Final    Comment: PATIENTS: Important information regarding your results and instructions can  be found at [email or web address]   \"After your  Covid-19 Test\"  [facility] providers: PLEASE REFER TO DE-ESCALATION AND RETESTING PROTOCOL  on [email or web address]  **The [facility] COVID-19 SARS-CoV-2 test has been made available for use under  the Emergency Use Authorization (EUA) only.     • COVID Order Status 02/23/2021 Received   Final    Comment: The order for SARS CoV-2 testing " has been received by the  Laboratory. This result is neither positive nor negative.  Final results of testing will report separately.     ]    The ASCVD Risk score (Kemar MANCIA Jr., et al., 2013) failed to calculate for the following reasons:    The 2013 ASCVD risk score is only valid for ages 40 to 79    Social History     Tobacco Use   • Smoking status: Never Smoker   • Smokeless tobacco: Never Used   Vaping Use   • Vaping Use: Never used   Substance Use Topics   • Alcohol use: No     Comment: rarely - 1-2 a year   • Drug use: Yes     Types: Marijuana, Inhaled, Oral     Comment: Occasional       Family Status   Relation Name Status   • Mo  Alive   • Fa  Alive   • MGFa     • Sis  Alive   • MAunt     • MGMo  Alive   • PGMo  Alive   • PGFa     • Sis  Alive     Family History   Problem Relation Age of Onset   • Depression Mother    • Other Mother         hydrocephalus arnold-Chiari malformation   • Psychiatric Illness Mother         anxiety and depression   • Seizures Mother         epilepsy   • Diabetes Mother    • Hypertension Mother    • Migraines Mother    • Depression Father    • Allergies Father    • Psychiatric Illness Father         depression and bipolar   • Other Father         GERD   • Diabetes Maternal Grandfather    • Heart Disease Maternal Grandfather    • Hypertension Maternal Grandfather    • Hyperlipidemia Maternal Grandfather    • Other Sister         IBS   • Psychiatric Illness Sister         anxiety and depression   • Migraines Sister    • Cancer Maternal Aunt         pancreatic   • No Known Problems Maternal Grandmother    • No Known Problems Paternal Grandmother    • Heart Disease Paternal Grandfather    • No Known Problems Sister        Patient's last menstrual period was 2021 (approximate).    Health Maintenance Summary                IMM VARICELLA (CHICKENPOX) VACCINE Overdue 3/20/1999     IMM HPV VACCINE Overdue 3/20/2009     COVID-19 Vaccine Overdue 3/20/2010      "IMM MENINGOCOCCAL B VACCINE HEALTHY PATIENTS AGED 16 TO 23 Overdue 3/20/2014     IMM DTaP/Tdap/Td Vaccine Overdue 3/20/2017     PAP SMEAR Overdue 3/20/2019     IMM INFLUENZA Next Due 9/1/2021     CHLAMYDIA SCREENING Next Due 5/27/2022      Done 5/27/2021 CHLAMYDIA/GC PCR URINE OR SWAB     Patient has more history with this topic...           Review of Systems:   Constitutional: Positive for fatigue.  Negative for fever, chills, weight change, loss of appetite.  HNT: Negative for nosebleeds, congestion, odynophagia, sore throat or changes in taste.    Eyes: Negative for vision changes.   Ears: Negative for recent changes in hearing, pain or discharge.  Neck: Negative for pain, swelling, lumps or goiter.  Respiratory: Negative for cough, sputum production, shortness of breath and wheezing.    Cardiovascular: Negative for chest pain, palpitations, orthopnea and leg swelling.   Gastrointestinal: Negative for constipation, diarrhea, heartburn, dysphagia, nausea, vomiting or abdominal pain.   Genitourinary: Negative for dysuria, urgency and frequency.   Musculoskeletal: Negative for myalgias, joint pain, and back pain.  Skin: Negative for skin, hair or nail changes, rash, itching.   Neurological: Negative for dizziness, tingling, tremors, sensory change, gait/coordination changes, focal weakness and headaches.   Endo/Heme/Allergies: Does not bruise/bleed easily.   Psychiatric/Behavioral: Negative for depression, suicidal ideas and memory loss.  The patient is not nervous/anxious and does not have insomnia.        Exam:  /68   Pulse 78   Temp 36.9 °C (98.5 °F) (Temporal)   Resp 16   Ht 1.626 m (5' 4\")   Wt 68.6 kg (151 lb 3.2 oz)   SpO2 99%  Body mass index is 25.95 kg/m².  General:  Well nourished, well developed female. Body mass index is 25.95 kg/m². No apparent distress.  Eyes: EOM intact, PERRL, conjunctiva non-injected, sclera non-icteric.  Neck: Supple with no cervical lymphadenopathy, JVD, palpable " thyroid nodules or carotid bruits.  Pulmonary: Clear to ausculation bilaterally. Normal effort. No rales, ronchi, or wheezing.  Cardiovascular: Regular rate and rhythm without murmur, rub or gallop.   Extremities: Full range of motion. Warm and well perfused with no edema.  Skin: Intact with no obvious rashes or lesions.  Neuro: Cranial nerves I-XII intact.  Psych: Alert and oriented x 3.  Appropriately dressed. Mood and affect appropriate.      Assessment/Plan:  1. Chronic fatigue     2. Vitamin D deficiency  Cholecalciferol (VITAMIN D3) 50 MCG (2000 UT) Tab       Reviewed risks and benefits of treatment plan. Patient verbally agrees to plan of care.     Return in about 1 year (around 6/3/2022).    Please note that this dictation was created using voice recognition software. I have made every reasonable attempt to correct obvious errors, but I expect that there are errors of grammar and possibly content that I did not discover before finalizing the note.

## 2021-06-03 NOTE — ASSESSMENT & PLAN NOTE
This is a chronic health problem that is uncontrolled with current lifestyle measures. Has not been on a supplement lately. Level is low at 24. Restart supplement 2000 units daily and repeat labs in 1 year.

## 2021-06-12 ENCOUNTER — HOSPITAL ENCOUNTER (EMERGENCY)
Facility: MEDICAL CENTER | Age: 21
End: 2021-06-13
Payer: MEDICAID

## 2021-06-12 PROCEDURE — 302449 STATCHG TRIAGE ONLY (STATISTIC)

## 2021-06-13 ENCOUNTER — HOSPITAL ENCOUNTER (OUTPATIENT)
Facility: MEDICAL CENTER | Age: 21
End: 2021-06-13
Attending: PHYSICIAN ASSISTANT
Payer: MEDICAID

## 2021-06-13 ENCOUNTER — OFFICE VISIT (OUTPATIENT)
Dept: URGENT CARE | Facility: PHYSICIAN GROUP | Age: 21
End: 2021-06-13
Payer: MEDICAID

## 2021-06-13 VITALS
BODY MASS INDEX: 25.41 KG/M2 | HEART RATE: 84 BPM | DIASTOLIC BLOOD PRESSURE: 86 MMHG | TEMPERATURE: 98 F | RESPIRATION RATE: 18 BRPM | WEIGHT: 148.81 LBS | SYSTOLIC BLOOD PRESSURE: 111 MMHG | OXYGEN SATURATION: 96 % | HEIGHT: 64 IN

## 2021-06-13 VITALS
HEART RATE: 74 BPM | WEIGHT: 150 LBS | BODY MASS INDEX: 25.61 KG/M2 | OXYGEN SATURATION: 98 % | HEIGHT: 64 IN | TEMPERATURE: 98.9 F | DIASTOLIC BLOOD PRESSURE: 74 MMHG | RESPIRATION RATE: 14 BRPM | SYSTOLIC BLOOD PRESSURE: 120 MMHG

## 2021-06-13 DIAGNOSIS — R30.0 DYSURIA: ICD-10-CM

## 2021-06-13 DIAGNOSIS — R35.0 URINARY FREQUENCY: ICD-10-CM

## 2021-06-13 DIAGNOSIS — N30.01 ACUTE CYSTITIS WITH HEMATURIA: ICD-10-CM

## 2021-06-13 DIAGNOSIS — R39.15 URINARY URGENCY: ICD-10-CM

## 2021-06-13 DIAGNOSIS — Z11.3 ROUTINE SCREENING FOR STI (SEXUALLY TRANSMITTED INFECTION): ICD-10-CM

## 2021-06-13 LAB
APPEARANCE UR: NORMAL
BILIRUB UR STRIP-MCNC: NORMAL MG/DL
COLOR UR AUTO: YELLOW
GLUCOSE UR STRIP.AUTO-MCNC: NORMAL MG/DL
INT CON NEG: NORMAL
INT CON POS: NORMAL
KETONES UR STRIP.AUTO-MCNC: NORMAL MG/DL
LEUKOCYTE ESTERASE UR QL STRIP.AUTO: NORMAL
NITRITE UR QL STRIP.AUTO: POSITIVE
PH UR STRIP.AUTO: NORMAL [PH] (ref 5–8)
POC URINE PREGNANCY TEST: NEGATIVE
PROT UR QL STRIP: >=300 MG/DL
RBC UR QL AUTO: 7
SP GR UR STRIP.AUTO: >=1.03
UROBILINOGEN UR STRIP-MCNC: 0.2 MG/DL

## 2021-06-13 PROCEDURE — 87480 CANDIDA DNA DIR PROBE: CPT

## 2021-06-13 PROCEDURE — 87510 GARDNER VAG DNA DIR PROBE: CPT

## 2021-06-13 PROCEDURE — 87086 URINE CULTURE/COLONY COUNT: CPT

## 2021-06-13 PROCEDURE — 87186 SC STD MICRODIL/AGAR DIL: CPT

## 2021-06-13 PROCEDURE — 99214 OFFICE O/P EST MOD 30 MIN: CPT | Performed by: PHYSICIAN ASSISTANT

## 2021-06-13 PROCEDURE — 87660 TRICHOMONAS VAGIN DIR PROBE: CPT

## 2021-06-13 PROCEDURE — 87077 CULTURE AEROBIC IDENTIFY: CPT

## 2021-06-13 PROCEDURE — 87591 N.GONORRHOEAE DNA AMP PROB: CPT

## 2021-06-13 PROCEDURE — 81025 URINE PREGNANCY TEST: CPT | Performed by: PHYSICIAN ASSISTANT

## 2021-06-13 PROCEDURE — 81002 URINALYSIS NONAUTO W/O SCOPE: CPT | Performed by: PHYSICIAN ASSISTANT

## 2021-06-13 PROCEDURE — 87491 CHLMYD TRACH DNA AMP PROBE: CPT

## 2021-06-13 RX ORDER — PHENAZOPYRIDINE HYDROCHLORIDE 200 MG/1
200 TABLET, FILM COATED ORAL 3 TIMES DAILY PRN
Qty: 10 TABLET | Refills: 0 | Status: SHIPPED | OUTPATIENT
Start: 2021-06-13 | End: 2021-11-14

## 2021-06-13 RX ORDER — NITROFURANTOIN 25; 75 MG/1; MG/1
100 CAPSULE ORAL EVERY 12 HOURS
Qty: 10 CAPSULE | Refills: 0 | Status: SHIPPED | OUTPATIENT
Start: 2021-06-13 | End: 2021-06-18

## 2021-06-13 ASSESSMENT — FIBROSIS 4 INDEX
FIB4 SCORE: 0.39
FIB4 SCORE: 0.39

## 2021-06-13 NOTE — ED TRIAGE NOTES
Vitals:    06/12/21 2359   BP: 111/86   Pulse: 84   Resp: 18   Temp: 36.7 °C (98 °F)   SpO2: 96%     Vitals:    06/12/21 2359   BP: 111/86   Pulse: 84   Resp: 18   Temp: 36.7 °C (98 °F)   SpO2: 96%       Pt states she is having vaginal pain and describes the pain as pressure. Pt endorses frequent and painful urination. Denies vaginal discharge or bleeding. Pt states she is having normal periods, last menstrual cycle 5/20/21.     Pt placed in lobby and educated on waiting room process. Apologized for wait time.     Pt instructed to notify RN of any new or worsening symptoms.

## 2021-06-13 NOTE — PROGRESS NOTES
Chief Complaint   Patient presents with   • Vaginal Pain     x 1 day , frequent urination ,        HISTORY OF PRESENT ILLNESS: Patient is a 21 y.o. female who presents today for the following:    Patient is in for evaluation of dysuria that started last night.  She complains of increased urinary frequency and urgency and describes vaginal pain but denies any external genitalia pain.  She denies any discharges out of the ordinary from normal.  She is sexually active with 1 male partner.  She reports lower abdominal pain and mild flank pain.  She reports mild nausea and mild body aches without vomiting or fever.    Patient Active Problem List    Diagnosis Date Noted   • Vitamin D deficiency 06/03/2021   • Uses contraceptive implant for birth control 05/27/2021   • Anxiety 05/27/2021   • Chronic fatigue 05/27/2021   • Dry skin 05/27/2021   • Hair loss 05/27/2021   • Cold intolerance 05/27/2021   • Seasonal allergies 05/27/2021   • Moderate episode of recurrent major depressive disorder (HCC) 04/30/2019       Allergies:Norco [hydrocodone-acetaminophen]    Current Outpatient Medications Ordered in Epic   Medication Sig Dispense Refill   • nitrofurantoin (MACROBID) 100 MG Cap Take 1 capsule by mouth every 12 hours for 5 days. 10 capsule 0   • phenazopyridine (PYRIDIUM) 200 MG Tab Take 1 tablet by mouth 3 times a day as needed for Mild Pain or Moderate Pain. 10 tablet 0   • SPRINTEC 28 0.25-35 MG-MCG per tablet Take 1 tablet by mouth.     • etonogestrel (NEXPLANON) 68 MG Implant implant Inject 1 Each under the skin.       No current Epic-ordered facility-administered medications on file.       Past Medical History:   Diagnosis Date   • Allergy     Norco, seasonal   • Anxiety    • Asthma     as a child   • Depression    • History of depression 4/29/2019   • IBD (inflammatory bowel disease)     IBS with constipation       Social History     Tobacco Use   • Smoking status: Never Smoker   • Smokeless tobacco: Never Used  "  Vaping Use   • Vaping Use: Never used   Substance Use Topics   • Alcohol use: No     Comment: rarely - 1-2 a year   • Drug use: Yes     Types: Marijuana, Inhaled, Oral     Comment: Occasional       Family Status   Relation Name Status   • Mo  Alive   • Fa  Alive   • MGFa     • Sis  Alive   • MAunt     • MGMo  Alive   • PGMo  Alive   • PGFa     • Sis  Alive     Family History   Problem Relation Age of Onset   • Depression Mother    • Other Mother         hydrocephalus arnold-Chiari malformation   • Psychiatric Illness Mother         anxiety and depression   • Seizures Mother         epilepsy   • Diabetes Mother    • Hypertension Mother    • Migraines Mother    • Depression Father    • Allergies Father    • Psychiatric Illness Father         depression and bipolar   • Other Father         GERD   • Diabetes Maternal Grandfather    • Heart Disease Maternal Grandfather    • Hypertension Maternal Grandfather    • Hyperlipidemia Maternal Grandfather    • Other Sister         IBS   • Psychiatric Illness Sister         anxiety and depression   • Migraines Sister    • Cancer Maternal Aunt         pancreatic   • No Known Problems Maternal Grandmother    • No Known Problems Paternal Grandmother    • Heart Disease Paternal Grandfather    • No Known Problems Sister        Review of Systems:   Constitutional ROS: No unexpected change in weight, No weakness, No fatigue  Pulmonary ROS: No chronic cough, sputum, or hemoptysis, No dyspnea on exertion, No wheezing  Cardiovascular ROS: No diaphoresis, No edema, No palpitations  Gastrointestinal ROS: No change in bowel habits, No significant change in appetite, No nausea, vomiting, diarrhea, or constipation  Hematologic/Lymphatic ROS: No chills, No night sweats, No weight loss  Skin/Integumentary ROS: No edema, No evidence of rash, No itching      Exam:  /74   Pulse 74   Temp 37.2 °C (98.9 °F) (Temporal)   Resp 14   Ht 1.626 m (5' 4\")   Wt 68 kg (150 " lb)   SpO2 98%   General: Well developed, well nourished. No distress.  Pulmonary: Unlabored respiratory effort.   Back: No CVA tenderness noted.  Neurologic: Grossly nonfocal. No facial asymmetry noted.  Skin: Warm, dry, good turgor. No rashes in visible areas.   Psych: Normal mood. Alert and oriented x3. Judgment and insight is normal.    UA: Positive blood, leukocyte esterase, nitrites, greater than 300 protein  HCG: Negative    Assessment/Plan:  Drink plenty of fluids. Will contact patient with culture results. Use all medication as directed. Follow up for worsening or persistent symptoms.  1. Acute cystitis with hematuria  Urine Culture    nitrofurantoin (MACROBID) 100 MG Cap   2. Dysuria  POCT Urinalysis    POCT PREGNANCY    phenazopyridine (PYRIDIUM) 200 MG Tab   3. Urinary frequency  POCT Urinalysis    POCT PREGNANCY    phenazopyridine (PYRIDIUM) 200 MG Tab   4. Urinary urgency  POCT Urinalysis    POCT PREGNANCY    phenazopyridine (PYRIDIUM) 200 MG Tab   5. Routine screening for STI (sexually transmitted infection)  Chlamydia/GC PCR Urine Or Swab    VAGINAL PATHOGENS DNA PANEL

## 2021-06-13 NOTE — LETTER
June 13, 2021         Patient: Vita Giordano   YOB: 2000   Date of Visit: 6/13/2021           To Whom it May Concern:    Vita Giordano was seen in my clinic on 6/13/2021. She may return to work 6/14/21.    If you have any questions or concerns, please don't hesitate to call.        Sincerely,           Dayan Lea P.A.-C.  Electronically Signed

## 2021-06-14 DIAGNOSIS — N30.01 ACUTE CYSTITIS WITH HEMATURIA: ICD-10-CM

## 2021-06-14 DIAGNOSIS — Z11.3 ROUTINE SCREENING FOR STI (SEXUALLY TRANSMITTED INFECTION): ICD-10-CM

## 2021-06-17 DIAGNOSIS — N30.00 ACUTE CYSTITIS WITHOUT HEMATURIA: ICD-10-CM

## 2021-06-17 LAB
BACTERIA UR CULT: ABNORMAL
BACTERIA UR CULT: ABNORMAL
SIGNIFICANT IND 70042: ABNORMAL
SITE SITE: ABNORMAL
SOURCE SOURCE: ABNORMAL

## 2021-06-17 RX ORDER — CEFDINIR 300 MG/1
300 CAPSULE ORAL 2 TIMES DAILY
Qty: 10 CAPSULE | Refills: 0 | Status: SHIPPED | OUTPATIENT
Start: 2021-06-17 | End: 2021-11-14

## 2021-07-13 ENCOUNTER — OFFICE VISIT (OUTPATIENT)
Dept: URGENT CARE | Facility: PHYSICIAN GROUP | Age: 21
End: 2021-07-13
Payer: MEDICAID

## 2021-07-13 VITALS
HEART RATE: 78 BPM | SYSTOLIC BLOOD PRESSURE: 110 MMHG | TEMPERATURE: 97.4 F | OXYGEN SATURATION: 99 % | DIASTOLIC BLOOD PRESSURE: 80 MMHG | RESPIRATION RATE: 16 BRPM | BODY MASS INDEX: 25.1 KG/M2 | WEIGHT: 147 LBS | HEIGHT: 64 IN

## 2021-07-13 DIAGNOSIS — S86.911A KNEE STRAIN, RIGHT, INITIAL ENCOUNTER: ICD-10-CM

## 2021-07-13 PROCEDURE — 99214 OFFICE O/P EST MOD 30 MIN: CPT | Performed by: PHYSICIAN ASSISTANT

## 2021-07-13 RX ORDER — VITAMIN B COMPLEX
1000 TABLET ORAL DAILY
COMMUNITY
End: 2022-08-31

## 2021-07-13 ASSESSMENT — FIBROSIS 4 INDEX: FIB4 SCORE: 0.39

## 2021-07-13 NOTE — LETTER
July 13, 2021         Patient: Vita Giordano   YOB: 2000   Date of Visit: 7/13/2021           To Whom it May Concern:    Vita Giordano was seen in my clinic on 7/13/2021.  Patient will need to use crutches at work until cleared by orthopedics.    If you have any questions or concerns, please don't hesitate to call.        Sincerely,           Dayan Lea P.A.-C.  Electronically Signed

## 2021-07-14 NOTE — PROGRESS NOTES
Chief Complaint   Patient presents with   • Knee Pain     R knee, x3-4days        HISTORY OF PRESENT ILLNESS: Patient is a 21 y.o. female who presents today for the following:    Patient was at a concert 7/10 when she felt like her right kneecap dislocated medially  She tried a knee brace but it made it worse, putting pressure on her knee  + STS, pain with extension  No pain with flexion  Denies distal paresthesias  History of knee surgery on the left knee    Patient Active Problem List    Diagnosis Date Noted   • Vitamin D deficiency 06/03/2021   • Uses contraceptive implant for birth control 05/27/2021   • Anxiety 05/27/2021   • Chronic fatigue 05/27/2021   • Dry skin 05/27/2021   • Hair loss 05/27/2021   • Cold intolerance 05/27/2021   • Seasonal allergies 05/27/2021   • Moderate episode of recurrent major depressive disorder (HCC) 04/30/2019       Allergies:Norco [hydrocodone-acetaminophen]    Current Outpatient Medications Ordered in Epic   Medication Sig Dispense Refill   • vitamin D (CHOLECALCIFEROL) 1000 Unit (25 mcg) Tab Take 1,000 Units by mouth every day.     • SPRINTEC 28 0.25-35 MG-MCG per tablet Take 1 tablet by mouth.     • etonogestrel (NEXPLANON) 68 MG Implant implant Inject 1 Each under the skin.     • cefdinir (OMNICEF) 300 MG Cap Take 1 capsule by mouth 2 times a day. 10 capsule 0   • phenazopyridine (PYRIDIUM) 200 MG Tab Take 1 tablet by mouth 3 times a day as needed for Mild Pain or Moderate Pain. 10 tablet 0     No current Epic-ordered facility-administered medications on file.       Past Medical History:   Diagnosis Date   • Allergy     Norco, seasonal   • Anxiety    • Asthma     as a child   • Depression    • History of depression 4/29/2019   • IBD (inflammatory bowel disease)     IBS with constipation       Social History     Tobacco Use   • Smoking status: Never Smoker   • Smokeless tobacco: Never Used   Vaping Use   • Vaping Use: Never used   Substance Use Topics   • Alcohol use: No      "Comment: rarely - 1-2 a year   • Drug use: Yes     Types: Marijuana, Inhaled, Oral     Comment: Occasional       Family Status   Relation Name Status   • Mo  Alive   • Fa  Alive   • MGFa     • Sis  Alive   • MAunt     • MGMo  Alive   • PGMo  Alive   • PGFa     • Sis  Alive     Family History   Problem Relation Age of Onset   • Depression Mother    • Other Mother         hydrocephalus arnold-Chiari malformation   • Psychiatric Illness Mother         anxiety and depression   • Seizures Mother         epilepsy   • Diabetes Mother    • Hypertension Mother    • Migraines Mother    • Depression Father    • Allergies Father    • Psychiatric Illness Father         depression and bipolar   • Other Father         GERD   • Diabetes Maternal Grandfather    • Heart Disease Maternal Grandfather    • Hypertension Maternal Grandfather    • Hyperlipidemia Maternal Grandfather    • Other Sister         IBS   • Psychiatric Illness Sister         anxiety and depression   • Migraines Sister    • Cancer Maternal Aunt         pancreatic   • No Known Problems Maternal Grandmother    • No Known Problems Paternal Grandmother    • Heart Disease Paternal Grandfather    • No Known Problems Sister        Review of Systems:   Constitutional ROS: No unexpected change in weight, No weakness, No fatigue  Pulmonary ROS: No chronic cough, sputum, or hemoptysis, No dyspnea on exertion, No wheezing  Cardiovascular ROS: No diaphoresis, No edema, No palpitations  Musculoskeletal/Extremities ROS: Right knee pain  Hematologic/Lymphatic ROS: No chills, No night sweats, No weight loss  Skin/Integumentary ROS: No edema, No evidence of rash, No itching      Exam:  /80   Pulse 78   Temp 36.3 °C (97.4 °F) (Temporal)   Resp 16   Ht 1.626 m (5' 4\")   Wt 66.7 kg (147 lb)   SpO2 99%   General: Well developed, well nourished. No distress.    HENT: Head is grossly normal.  Pulmonary: Unlabored respiratory effort.   Neurologic: " Grossly nonfocal. No facial asymmetry noted.  Right knee: Tenderness noted on and around the patella.  No ecchymosis noted.  Trace edema is noted.  Full extension with pain.  Full flexion without pain.  Skin: Warm, dry, good turgor. No rashes in visible areas.   Psych: Normal mood. Alert and oriented to person, place and time.    Assessment/Plan:  Declines knee brace.  Crutches provided.  Discussed appropriate over-the-counter symptomatic medication, and when to return to clinic. Follow up for worsening or persistent symptoms.  1. Knee strain, right, initial encounter  REFERRAL TO ORTHOPEDICS    ?  Patellar dislocation.

## 2021-08-13 ENCOUNTER — HOSPITAL ENCOUNTER (OUTPATIENT)
Dept: RADIOLOGY | Facility: MEDICAL CENTER | Age: 21
End: 2021-08-13
Attending: PHYSICIAN ASSISTANT
Payer: MEDICAID

## 2021-08-13 DIAGNOSIS — M25.561 RIGHT KNEE PAIN, UNSPECIFIED CHRONICITY: ICD-10-CM

## 2021-08-13 PROCEDURE — 73721 MRI JNT OF LWR EXTRE W/O DYE: CPT | Mod: RT

## 2021-10-02 ENCOUNTER — OFFICE VISIT (OUTPATIENT)
Dept: URGENT CARE | Facility: PHYSICIAN GROUP | Age: 21
End: 2021-10-02
Payer: MEDICAID

## 2021-10-02 ENCOUNTER — HOSPITAL ENCOUNTER (OUTPATIENT)
Facility: MEDICAL CENTER | Age: 21
End: 2021-10-02
Attending: PHYSICIAN ASSISTANT
Payer: MEDICAID

## 2021-10-02 VITALS
HEART RATE: 88 BPM | WEIGHT: 146 LBS | OXYGEN SATURATION: 100 % | TEMPERATURE: 98.4 F | SYSTOLIC BLOOD PRESSURE: 122 MMHG | HEIGHT: 64 IN | DIASTOLIC BLOOD PRESSURE: 80 MMHG | BODY MASS INDEX: 24.92 KG/M2

## 2021-10-02 DIAGNOSIS — R06.2 WHEEZE: ICD-10-CM

## 2021-10-02 DIAGNOSIS — J06.9 UPPER RESPIRATORY TRACT INFECTION, UNSPECIFIED TYPE: ICD-10-CM

## 2021-10-02 DIAGNOSIS — R05.9 COUGH: ICD-10-CM

## 2021-10-02 PROCEDURE — 99214 OFFICE O/P EST MOD 30 MIN: CPT | Performed by: PHYSICIAN ASSISTANT

## 2021-10-02 PROCEDURE — U0003 INFECTIOUS AGENT DETECTION BY NUCLEIC ACID (DNA OR RNA); SEVERE ACUTE RESPIRATORY SYNDROME CORONAVIRUS 2 (SARS-COV-2) (CORONAVIRUS DISEASE [COVID-19]), AMPLIFIED PROBE TECHNIQUE, MAKING USE OF HIGH THROUGHPUT TECHNOLOGIES AS DESCRIBED BY CMS-2020-01-R: HCPCS

## 2021-10-02 PROCEDURE — U0005 INFEC AGEN DETEC AMPLI PROBE: HCPCS

## 2021-10-02 RX ORDER — DICLOFENAC SODIUM 75 MG/1
75 TABLET, DELAYED RELEASE ORAL
COMMUNITY
Start: 2021-07-27 | End: 2022-08-31

## 2021-10-02 RX ORDER — ALBUTEROL SULFATE 90 UG/1
2 AEROSOL, METERED RESPIRATORY (INHALATION) EVERY 4 HOURS PRN
Qty: 18.2 G | Refills: 0 | Status: SHIPPED | OUTPATIENT
Start: 2021-10-02 | End: 2022-08-31

## 2021-10-02 RX ORDER — DEXTROMETHORPHAN HYDROBROMIDE AND PROMETHAZINE HYDROCHLORIDE 15; 6.25 MG/5ML; MG/5ML
5 SYRUP ORAL EVERY 4 HOURS PRN
Qty: 120 ML | Refills: 0 | Status: SHIPPED | OUTPATIENT
Start: 2021-10-02 | End: 2021-11-16 | Stop reason: SDUPTHER

## 2021-10-02 ASSESSMENT — FIBROSIS 4 INDEX: FIB4 SCORE: 0.39

## 2021-10-02 NOTE — PROGRESS NOTES
Chief Complaint   Patient presents with   • Cough     w/phlegm x1wk   • Congestion     x1wk   • Back Pain     Lower. x6days   • GI Problem     upset       HISTORY OF PRESENT ILLNESS: Patient is a 21 y.o. female who presents today because she has a 6-day history of cough with some phlegm production, nasal congestion, lower back pain and GI upset.  She has been taking Robitussin for symptoms without any significant improvement.  She is not vaccinated for Covid    Patient Active Problem List    Diagnosis Date Noted   • Vitamin D deficiency 06/03/2021   • Uses contraceptive implant for birth control 05/27/2021   • Anxiety 05/27/2021   • Chronic fatigue 05/27/2021   • Dry skin 05/27/2021   • Hair loss 05/27/2021   • Cold intolerance 05/27/2021   • Seasonal allergies 05/27/2021   • Moderate episode of recurrent major depressive disorder (HCC) 04/30/2019       Allergies:Norco [hydrocodone-acetaminophen]    Current Outpatient Medications Ordered in Epic   Medication Sig Dispense Refill   • albuterol 108 (90 Base) MCG/ACT Aero Soln inhalation aerosol Inhale 2 Puffs every four hours as needed. With spacer device 18.2 g 0   • promethazine-dextromethorphan (PROMETHAZINE-DM) 6.25-15 MG/5ML syrup Take 5 mL by mouth every four hours as needed for Cough. 120 mL 0   • diclofenac DR (VOLTAREN) 75 MG Tablet Delayed Response Take 75 mg by mouth.     • vitamin D (CHOLECALCIFEROL) 1000 Unit (25 mcg) Tab Take 1,000 Units by mouth every day.     • cefdinir (OMNICEF) 300 MG Cap Take 1 capsule by mouth 2 times a day. 10 capsule 0   • phenazopyridine (PYRIDIUM) 200 MG Tab Take 1 tablet by mouth 3 times a day as needed for Mild Pain or Moderate Pain. 10 tablet 0   • SPRINTEC 28 0.25-35 MG-MCG per tablet Take 1 tablet by mouth.     • etonogestrel (NEXPLANON) 68 MG Implant implant Inject 1 Each under the skin.       No current Epic-ordered facility-administered medications on file.       Past Medical History:   Diagnosis Date   • Allergy      Norco, seasonal   • Anxiety    • Asthma     as a child   • Depression    • History of depression 2019   • IBD (inflammatory bowel disease)     IBS with constipation       Social History     Tobacco Use   • Smoking status: Former Smoker   • Smokeless tobacco: Never Used   Vaping Use   • Vaping Use: Never used   Substance Use Topics   • Alcohol use: Not Currently     Comment: rarely - 1-2 a year   • Drug use: Yes     Types: Marijuana, Inhaled, Oral     Comment: Occasional       Family Status   Relation Name Status   • Mo  Alive   • Fa  Alive   • MGFa     • Sis  Alive   • MAunt     • MGMo  Alive   • PGMo  Alive   • PGFa     • Sis  Alive     Family History   Problem Relation Age of Onset   • Depression Mother    • Other Mother         hydrocephalus arnold-Chiari malformation   • Psychiatric Illness Mother         anxiety and depression   • Seizures Mother         epilepsy   • Diabetes Mother    • Hypertension Mother    • Migraines Mother    • Depression Father    • Allergies Father    • Psychiatric Illness Father         depression and bipolar   • Other Father         GERD   • Diabetes Maternal Grandfather    • Heart Disease Maternal Grandfather    • Hypertension Maternal Grandfather    • Hyperlipidemia Maternal Grandfather    • Other Sister         IBS   • Psychiatric Illness Sister         anxiety and depression   • Migraines Sister    • Cancer Maternal Aunt         pancreatic   • No Known Problems Maternal Grandmother    • No Known Problems Paternal Grandmother    • Heart Disease Paternal Grandfather    • No Known Problems Sister        ROS:  Review of Systems   Constitutional: Negative for fever, positive for myalgias and malaise/fatigue.   HENT: Negative for ear pain, nosebleeds, congestion, sore throat and neck pain.    Eyes: Negative for blurred vision.   Respiratory: Positive for cough, sputum production, no noticeable shortness of breath and wheezing.    Cardiovascular: Negative for  "chest pain, palpitations, orthopnea and leg swelling.   Gastrointestinal: Negative for heartburn, nausea, vomiting and positive for upset abdominal pain.   Genitourinary: Negative for dysuria, urgency and frequency.     Exam:  /80 (BP Location: Right arm, Patient Position: Sitting, BP Cuff Size: Adult)   Pulse 88   Temp 36.9 °C (98.4 °F) (Oral)   Ht 1.626 m (5' 4\")   Wt 66.2 kg (146 lb)   SpO2 100%   General:  Well nourished, well developed female in NAD  Head:Normocephalic, atraumatic  Eyes: PERRLA, EOM within normal limits, no conjunctival injection, no scleral icterus, visual fields and acuity grossly intact.  Ears: Normal shape and symmetry, no tenderness, no discharge. External canals are without any significant edema or erythema. Tympanic membranes are without any inflammation, no effusion. Gross auditory acuity is intact  Nose: Symmetrical without tenderness, clear discharge.  Mouth: reasonable hygiene, no erythema exudates or tonsillar enlargement.  Neck: no masses, range of motion within normal limits, no tracheal deviation. No obvious thyroid enlargement.  Pulmonary: chest is symmetrical with respiration, occasional wheeze, no rales or rhonchi   cardiovascular: regular rate and rhythm without murmurs, rubs, or gallops.  Extremities: no clubbing, cyanosis, or edema.    Please note that this dictation was created using voice recognition software. I have made every reasonable attempt to correct obvious errors, but I expect that there are errors of grammar and possibly content that I did not discover before finalizing the note.    Assessment/Plan:  1. Upper respiratory tract infection, unspecified type  SARS-CoV-2 PCR (24 hour In-House): Collect NP swab in VTM   2. Cough  promethazine-dextromethorphan (PROMETHAZINE-DM) 6.25-15 MG/5ML syrup   3. Wheeze  albuterol 108 (90 Base) MCG/ACT Aero Soln inhalation aerosol   Discussed over-the-counter symptomatically, strict isolation until Covid test " returns    Followup with primary care in the next 7-10 days if not significantly improving, return to the urgent care or go to the emergency room sooner for any worsening of symptoms.

## 2021-10-03 DIAGNOSIS — J06.9 UPPER RESPIRATORY TRACT INFECTION, UNSPECIFIED TYPE: ICD-10-CM

## 2021-10-03 LAB — COVID ORDER STATUS COVID19: NORMAL

## 2021-10-04 LAB
SARS-COV-2 RNA RESP QL NAA+PROBE: DETECTED
SPECIMEN SOURCE: ABNORMAL

## 2021-11-16 ENCOUNTER — OFFICE VISIT (OUTPATIENT)
Dept: URGENT CARE | Facility: PHYSICIAN GROUP | Age: 21
End: 2021-11-16
Payer: MEDICAID

## 2021-11-16 ENCOUNTER — HOSPITAL ENCOUNTER (OUTPATIENT)
Facility: MEDICAL CENTER | Age: 21
End: 2021-11-16
Attending: PHYSICIAN ASSISTANT
Payer: MEDICAID

## 2021-11-16 VITALS
HEIGHT: 62 IN | WEIGHT: 147 LBS | RESPIRATION RATE: 14 BRPM | BODY MASS INDEX: 27.05 KG/M2 | DIASTOLIC BLOOD PRESSURE: 78 MMHG | TEMPERATURE: 97.8 F | HEART RATE: 88 BPM | OXYGEN SATURATION: 99 % | SYSTOLIC BLOOD PRESSURE: 100 MMHG

## 2021-11-16 DIAGNOSIS — R68.89 FLU-LIKE SYMPTOMS: ICD-10-CM

## 2021-11-16 DIAGNOSIS — R11.2 NAUSEA AND VOMITING, INTRACTABILITY OF VOMITING NOT SPECIFIED, UNSPECIFIED VOMITING TYPE: ICD-10-CM

## 2021-11-16 DIAGNOSIS — R05.9 COUGH: ICD-10-CM

## 2021-11-16 LAB
COVID ORDER STATUS COVID19: NORMAL
EXTERNAL QUALITY CONTROL: NORMAL
SARS-COV+SARS-COV-2 AG RESP QL IA.RAPID: NEGATIVE

## 2021-11-16 PROCEDURE — 99214 OFFICE O/P EST MOD 30 MIN: CPT | Performed by: PHYSICIAN ASSISTANT

## 2021-11-16 PROCEDURE — U0003 INFECTIOUS AGENT DETECTION BY NUCLEIC ACID (DNA OR RNA); SEVERE ACUTE RESPIRATORY SYNDROME CORONAVIRUS 2 (SARS-COV-2) (CORONAVIRUS DISEASE [COVID-19]), AMPLIFIED PROBE TECHNIQUE, MAKING USE OF HIGH THROUGHPUT TECHNOLOGIES AS DESCRIBED BY CMS-2020-01-R: HCPCS

## 2021-11-16 PROCEDURE — U0005 INFEC AGEN DETEC AMPLI PROBE: HCPCS

## 2021-11-16 PROCEDURE — 87426 SARSCOV CORONAVIRUS AG IA: CPT | Performed by: PHYSICIAN ASSISTANT

## 2021-11-16 RX ORDER — DEXTROMETHORPHAN HYDROBROMIDE AND PROMETHAZINE HYDROCHLORIDE 15; 6.25 MG/5ML; MG/5ML
5 SYRUP ORAL EVERY 4 HOURS PRN
Qty: 120 ML | Refills: 0 | Status: SHIPPED | OUTPATIENT
Start: 2021-11-16 | End: 2021-12-31

## 2021-11-16 RX ORDER — ONDANSETRON 4 MG/1
4 TABLET, ORALLY DISINTEGRATING ORAL EVERY 8 HOURS PRN
Qty: 20 TABLET | Refills: 0 | Status: SHIPPED | OUTPATIENT
Start: 2021-11-16 | End: 2022-08-31

## 2021-11-16 ASSESSMENT — FIBROSIS 4 INDEX: FIB4 SCORE: 0.39

## 2021-11-16 NOTE — PROGRESS NOTES
Chief Complaint   Patient presents with   • Nausea     neg rapid covid test at home pt states   • Sore Throat   • Vomiting       HISTORY OF PRESENT ILLNESS: Patient is a 21 y.o. female who presents today for the following:    Cough x 3-4 days  + ST, nasal congestion, N/V  Now ST only with coughing  + fever, body aches, chill  tmax 100  History of COVID infection: yes, last month, less than 6 weeks ago  + COVID exposure    Patient Active Problem List    Diagnosis Date Noted   • Vitamin D deficiency 06/03/2021   • Uses contraceptive implant for birth control 05/27/2021   • Anxiety 05/27/2021   • Chronic fatigue 05/27/2021   • Dry skin 05/27/2021   • Hair loss 05/27/2021   • Cold intolerance 05/27/2021   • Seasonal allergies 05/27/2021   • Moderate episode of recurrent major depressive disorder (HCC) 04/30/2019       Allergies:Norco [hydrocodone-acetaminophen]    Current Outpatient Medications Ordered in Epic   Medication Sig Dispense Refill   • ondansetron (ZOFRAN ODT) 4 MG TABLET DISPERSIBLE Take 1 Tablet by mouth every 8 hours as needed for Nausea. 20 Tablet 0   • albuterol 108 (90 Base) MCG/ACT Aero Soln inhalation aerosol Inhale 2 Puffs every four hours as needed. With spacer device 18.2 g 0   • promethazine-dextromethorphan (PROMETHAZINE-DM) 6.25-15 MG/5ML syrup Take 5 mL by mouth every four hours as needed for Cough. 120 mL 0   • vitamin D (CHOLECALCIFEROL) 1000 Unit (25 mcg) Tab Take 1,000 Units by mouth every day.     • diclofenac DR (VOLTAREN) 75 MG Tablet Delayed Response Take 75 mg by mouth. (Patient not taking: Reported on 11/16/2021)     • etonogestrel (NEXPLANON) 68 MG Implant implant Inject 1 Each under the skin.       No current Epic-ordered facility-administered medications on file.       Past Medical History:   Diagnosis Date   • Allergy     Norco, seasonal   • Anxiety    • Asthma     as a child   • Depression    • History of depression 4/29/2019   • IBD (inflammatory bowel disease)     IBS with  "constipation       Social History     Tobacco Use   • Smoking status: Former Smoker   • Smokeless tobacco: Never Used   Vaping Use   • Vaping Use: Never used   Substance Use Topics   • Alcohol use: Not Currently     Comment: rarely - 1-2 a year   • Drug use: Yes     Types: Marijuana, Inhaled, Oral     Comment: Occasional       Family Status   Relation Name Status   • Mo  Alive   • Fa  Alive   • MGFa     • Sis  Alive   • MAunt     • MGMo  Alive   • PGMo  Alive   • PGFa     • Sis  Alive     Family History   Problem Relation Age of Onset   • Depression Mother    • Other Mother         hydrocephalus arnold-Chiari malformation   • Psychiatric Illness Mother         anxiety and depression   • Seizures Mother         epilepsy   • Diabetes Mother    • Hypertension Mother    • Migraines Mother    • Depression Father    • Allergies Father    • Psychiatric Illness Father         depression and bipolar   • Other Father         GERD   • Diabetes Maternal Grandfather    • Heart Disease Maternal Grandfather    • Hypertension Maternal Grandfather    • Hyperlipidemia Maternal Grandfather    • Other Sister         IBS   • Psychiatric Illness Sister         anxiety and depression   • Migraines Sister    • Cancer Maternal Aunt         pancreatic   • No Known Problems Maternal Grandmother    • No Known Problems Paternal Grandmother    • Heart Disease Paternal Grandfather    • No Known Problems Sister        Review of Systems:   Pertinent systems reviewed with the patient.    Exam:  /78   Pulse 88   Temp 36.6 °C (97.8 °F)   Resp 14   Ht 1.575 m (5' 2\")   Wt 66.7 kg (147 lb)   SpO2 99%   General: Well developed, well nourished. No distress.    Eye: PERRL/EOMI; conjunctivae clear, lids normal.  ENMT: Lips without lesions, MMM. Oropharynx is clear. Bilateral TMs are within normal limits.  Pulmonary: Unlabored respiratory effort. Lungs clear to auscultation, no wheezes, no rhonchi.    Cardiovascular: " Regular rate and rhythm without murmur.   Neurologic: Grossly nonfocal. No facial asymmetry noted.  Lymph: No cervical lymphadenopathy noted.  Skin: Warm, dry, good turgor. No rashes in visible areas.   Psych: Normal mood. Alert and oriented to person, place and time.    Assessment/Plan:  Rapid Covid testing is negative.  Discussed likely viral etiology.  Vitals and exam are unremarkable.  Low suspicion for pneumonia.  Patient will be tested for PCR COVID and has been advised to quarantine until results are available. Discussed appropriate over-the-counter symptomatic medication, and when to return to clinic. Follow up for worsening or persistent symptoms.  1. Flu-like symptoms  SARS-CoV-2, PCR (In-House): Collect NP OR nasal swab in VTM    POCT SARS-COV Antigen ANKUR (Symptomatic Only)   2. Nausea and vomiting, intractability of vomiting not specified, unspecified vomiting type  ondansetron (ZOFRAN ODT) 4 MG TABLET DISPERSIBLE

## 2021-11-16 NOTE — LETTER
November 16, 2021         Patient: Vita Giordano   YOB: 2000   Date of Visit: 11/16/2021           To Whom it May Concern:    Vita Giordano was seen in my clinic on 11/16/2021.     Please excuse patient for missing school/work until COVID results are available, typically taking 1-3 days.  They have been advised to quarantine during this time.      If you have any questions or concerns, please don't hesitate to call.        Sincerely,           Dayan Lea P.A.-C.  Electronically Signed

## 2021-11-17 LAB
SARS-COV-2 RNA RESP QL NAA+PROBE: NOTDETECTED
SPECIMEN SOURCE: NORMAL

## 2021-12-07 ENCOUNTER — OFFICE VISIT (OUTPATIENT)
Dept: URGENT CARE | Facility: PHYSICIAN GROUP | Age: 21
End: 2021-12-07
Payer: MEDICAID

## 2021-12-07 VITALS
WEIGHT: 151 LBS | BODY MASS INDEX: 25.78 KG/M2 | OXYGEN SATURATION: 98 % | RESPIRATION RATE: 16 BRPM | HEART RATE: 77 BPM | TEMPERATURE: 98 F | HEIGHT: 64 IN | DIASTOLIC BLOOD PRESSURE: 70 MMHG | SYSTOLIC BLOOD PRESSURE: 110 MMHG

## 2021-12-07 DIAGNOSIS — B07.0 PLANTAR WART: ICD-10-CM

## 2021-12-07 PROCEDURE — 17110 DESTRUCTION B9 LES UP TO 14: CPT | Performed by: PHYSICIAN ASSISTANT

## 2021-12-07 RX ORDER — INHALER,ASSIST DEV,SMALL MASK
SPACER (EA) MISCELLANEOUS
COMMUNITY
Start: 2021-10-09 | End: 2022-08-31

## 2021-12-07 ASSESSMENT — FIBROSIS 4 INDEX: FIB4 SCORE: 0.39

## 2021-12-07 NOTE — PROGRESS NOTES
Chief Complaint   Patient presents with   • Warts     right big toe       HISTORY OF PRESENT ILLNESS: Patient is a 21 y.o. female who presents today because she has had a wart on the medial aspect of her right great toe for about 2 months.  She has tried over-the-counter remedies without improvement.  It is painful to walk on    Patient Active Problem List    Diagnosis Date Noted   • Vitamin D deficiency 06/03/2021   • Uses contraceptive implant for birth control 05/27/2021   • Anxiety 05/27/2021   • Chronic fatigue 05/27/2021   • Dry skin 05/27/2021   • Hair loss 05/27/2021   • Cold intolerance 05/27/2021   • Seasonal allergies 05/27/2021   • Moderate episode of recurrent major depressive disorder (HCC) 04/30/2019       Allergies:Norco [hydrocodone-acetaminophen]    Current Outpatient Medications Ordered in Epic   Medication Sig Dispense Refill   • Spacer/Aero-Holding Chambers ( SPACE CHAMBER ANTI-STATIC) Device      • ondansetron (ZOFRAN ODT) 4 MG TABLET DISPERSIBLE Take 1 Tablet by mouth every 8 hours as needed for Nausea. 20 Tablet 0   • promethazine-dextromethorphan (PROMETHAZINE-DM) 6.25-15 MG/5ML syrup Take 5 mL by mouth every four hours as needed for Cough. 120 mL 0   • diclofenac DR (VOLTAREN) 75 MG Tablet Delayed Response Take 75 mg by mouth. (Patient not taking: Reported on 11/16/2021)     • albuterol 108 (90 Base) MCG/ACT Aero Soln inhalation aerosol Inhale 2 Puffs every four hours as needed. With spacer device 18.2 g 0   • vitamin D (CHOLECALCIFEROL) 1000 Unit (25 mcg) Tab Take 1,000 Units by mouth every day.     • etonogestrel (NEXPLANON) 68 MG Implant implant Inject 1 Each under the skin.       No current Epic-ordered facility-administered medications on file.       Past Medical History:   Diagnosis Date   • Allergy     Norco, seasonal   • Anxiety    • Asthma     as a child   • Depression    • History of depression 4/29/2019   • IBD (inflammatory bowel disease)     IBS with constipation       Social  History     Tobacco Use   • Smoking status: Former Smoker   • Smokeless tobacco: Never Used   Vaping Use   • Vaping Use: Never used   Substance Use Topics   • Alcohol use: Not Currently     Comment: rarely - 1-2 a year   • Drug use: Yes     Frequency: 2.0 times per week     Types: Marijuana, Inhaled, Oral     Comment: Occasional       Family Status   Relation Name Status   • Mo  Alive   • Fa  Alive   • MGFa     • Sis  Alive   • MAunt     • MGMo  Alive   • PGMo  Alive   • PGFa     • Sis  Alive     Family History   Problem Relation Age of Onset   • Depression Mother    • Other Mother         hydrocephalus arnold-Chiari malformation   • Psychiatric Illness Mother         anxiety and depression   • Seizures Mother         epilepsy   • Diabetes Mother    • Hypertension Mother    • Migraines Mother    • Depression Father    • Allergies Father    • Psychiatric Illness Father         depression and bipolar   • Other Father         GERD   • Diabetes Maternal Grandfather    • Heart Disease Maternal Grandfather    • Hypertension Maternal Grandfather    • Hyperlipidemia Maternal Grandfather    • Other Sister         IBS   • Psychiatric Illness Sister         anxiety and depression   • Migraines Sister    • Cancer Maternal Aunt         pancreatic   • No Known Problems Maternal Grandmother    • No Known Problems Paternal Grandmother    • Heart Disease Paternal Grandfather    • No Known Problems Sister        ROS:  Review of Systems   Constitutional: Negative for fever, chills, weight loss and malaise/fatigue.   HENT: Negative for ear pain, nosebleeds, congestion, sore throat and neck pain.    Eyes: Negative for blurred vision.   Respiratory: Negative for cough, sputum production, shortness of breath and wheezing.    Cardiovascular: Negative for chest pain, palpitations, orthopnea and leg swelling.   Gastrointestinal: Negative for heartburn, nausea, vomiting and abdominal pain.   Genitourinary: Negative for  "dysuria, urgency and frequency.     Exam:  /70   Pulse 77   Temp 36.7 °C (98 °F) (Temporal)   Resp 16   Ht 1.626 m (5' 4\")   Wt 68.5 kg (151 lb)   SpO2 98%   General:  Well nourished, well developed female in NAD  Head:Normocephalic, atraumatic  Eyes: PERRLA, EOM within normal limits, no conjunctival injection, no scleral icterus, visual fields and acuity grossly intact.  Nose: Symmetrical without tenderness, no discharge.  Mouth: reasonable hygiene, no erythema exudates or tonsillar enlargement.  Neck: no masses, range of motion within normal limits, no tracheal deviation. No obvious thyroid enlargement.  Extremities: no clubbing, cyanosis, or edema.  On the medial aspect of her right great toe there is a 1 cm diameter verrucous lesion    Please note that this dictation was created using voice recognition software. I have made every reasonable attempt to correct obvious errors, but I expect that there are errors of grammar and possibly content that I did not discover before finalizing the note.    Assessment/Plan:  1. Plantar wart       Cryotherapy x3, wound care instructions given.  Followup with primary care in the next 7-10 days if not significantly improving, return to the urgent care or go to the emergency room sooner for any worsening of symptoms.       "

## 2021-12-31 ENCOUNTER — OFFICE VISIT (OUTPATIENT)
Dept: URGENT CARE | Facility: PHYSICIAN GROUP | Age: 21
End: 2021-12-31
Payer: MEDICAID

## 2021-12-31 ENCOUNTER — HOSPITAL ENCOUNTER (OUTPATIENT)
Facility: MEDICAL CENTER | Age: 21
End: 2021-12-31
Attending: PHYSICIAN ASSISTANT
Payer: MEDICAID

## 2021-12-31 VITALS
WEIGHT: 153 LBS | HEIGHT: 64 IN | TEMPERATURE: 97.4 F | HEART RATE: 81 BPM | RESPIRATION RATE: 16 BRPM | DIASTOLIC BLOOD PRESSURE: 72 MMHG | OXYGEN SATURATION: 100 % | BODY MASS INDEX: 26.12 KG/M2 | SYSTOLIC BLOOD PRESSURE: 108 MMHG

## 2021-12-31 DIAGNOSIS — Z11.52 ENCOUNTER FOR SCREENING FOR COVID-19: ICD-10-CM

## 2021-12-31 PROCEDURE — U0003 INFECTIOUS AGENT DETECTION BY NUCLEIC ACID (DNA OR RNA); SEVERE ACUTE RESPIRATORY SYNDROME CORONAVIRUS 2 (SARS-COV-2) (CORONAVIRUS DISEASE [COVID-19]), AMPLIFIED PROBE TECHNIQUE, MAKING USE OF HIGH THROUGHPUT TECHNOLOGIES AS DESCRIBED BY CMS-2020-01-R: HCPCS

## 2021-12-31 PROCEDURE — 99212 OFFICE O/P EST SF 10 MIN: CPT | Mod: CS | Performed by: PHYSICIAN ASSISTANT

## 2021-12-31 ASSESSMENT — FIBROSIS 4 INDEX: FIB4 SCORE: 0.39

## 2022-01-01 NOTE — PROGRESS NOTES
Subjective:   Vita Giordano is a 21 y.o. female who presents for Coronavirus Screening (PCR test for surgery )        Patient is having surgery through [facility] on January 4.  Her surgeon instructed her to come to urgent care for a Covid screening prior to her surgery.  She is currently asymptomatic.    ROS    PMH:  has a past medical history of Acute nasopharyngitis, Allergy, Anxiety, Asthma, COVID-19 (10/02/2021), Depression, History of depression (4/29/2019), and IBD (inflammatory bowel disease). She also has no past medical history of Acute pain, Addisons disease (HCC), Adrenal disorder (HCC), Anemia, Anesthesia, Anginal syndrome (HCC), Arrhythmia, Arthritis, At risk for sleep apnea, Blood clotting disorder (HCC), Blood transfusion without reported diagnosis, Bowel habit changes, Breath shortness, Bronchitis, Cancer (HCC), Carcinoma in situ of respiratory system, Cataract, Chronic pain, Clotting disorder (HCC), Congestive heart failure (HCC), Continuous ambulatory peritoneal dialysis status (HCC), COPD (chronic obstructive pulmonary disease) (HCC), Coughing blood, Dental disorder, Diabetes (HCC), Dialysis patient (HCC), Emphysema of lung (HCC), GERD (gastroesophageal reflux disease), Glaucoma, Gynecological disorder, Head ache, Heart burn, Heart murmur, Heart valve disease, Hemorrhagic disorder (HCC), Hepatitis A, Hepatitis B, Hepatitis C, Hiatus hernia syndrome, High cholesterol, Hyperlipidemia, Hypertension, Indigestion, Jaundice, Kidney disease, Malignant hyperthermia, Migraine, Myocardial infarct (HCC), Pacemaker, Pituitary disease (HCC), Pneumonia, PONV (postoperative nausea and vomiting), Rheumatic fever, Seizure (HCC), Sleep apnea, Snoring, Stroke (HCC), Substance abuse (HCC), Thyroid disease, Tuberculosis, Urinary bladder disorder, or Urinary incontinence.  MEDS:   Current Outpatient Medications:   •  Spacer/Aero-Holding Chambers (EQ SPACE CHAMBER ANTI-STATIC) Device, , Disp: , Rfl:   •   "ondansetron (ZOFRAN ODT) 4 MG TABLET DISPERSIBLE, Take 1 Tablet by mouth every 8 hours as needed for Nausea., Disp: 20 Tablet, Rfl: 0  •  diclofenac DR (VOLTAREN) 75 MG Tablet Delayed Response, Take 75 mg by mouth., Disp: , Rfl:   •  albuterol 108 (90 Base) MCG/ACT Aero Soln inhalation aerosol, Inhale 2 Puffs every four hours as needed. With spacer device, Disp: 18.2 g, Rfl: 0  •  vitamin D (CHOLECALCIFEROL) 1000 Unit (25 mcg) Tab, Take 1,000 Units by mouth every day., Disp: , Rfl:   •  etonogestrel (NEXPLANON) 68 MG Implant implant, Inject 1 Each under the skin., Disp: , Rfl:   •  promethazine-dextromethorphan (PROMETHAZINE-DM) 6.25-15 MG/5ML syrup, Take 5 mL by mouth every four hours as needed for Cough. (Patient not taking: Reported on 12/31/2021), Disp: 120 mL, Rfl: 0  ALLERGIES:   Allergies   Allergen Reactions   • Norco [Hydrocodone-Acetaminophen] Vomiting     SURGHX:   Past Surgical History:   Procedure Laterality Date   • DENTAL EXTRACTION(S) Bilateral 2019   • KNEE ARTHROSCOPY Left    • OTHER      oral, wisdom teeth pulled   • TONSILLECTOMY       SOCHX:  reports that she quit smoking about 5 years ago. She has never used smokeless tobacco. She reports previous alcohol use. She reports current drug use. Frequency: 2.00 times per week. Drugs: Marijuana, Inhaled, and Oral.  FH: Family history was reviewed, no pertinent findings to report   Objective:   /72   Pulse 81   Temp 36.3 °C (97.4 °F) (Temporal)   Resp 16   Ht 1.626 m (5' 4\")   Wt 69.4 kg (153 lb)   LMP 12/30/2021   SpO2 100%   BMI 26.26 kg/m²   Physical Exam  Vitals reviewed.   Constitutional:       General: She is not in acute distress.     Appearance: Normal appearance. She is well-developed. She is not toxic-appearing.   HENT:      Head: Normocephalic and atraumatic.      Right Ear: External ear normal.      Left Ear: External ear normal.      Nose: Nose normal.   Cardiovascular:      Rate and Rhythm: Normal rate and regular rhythm. "   Pulmonary:      Effort: Pulmonary effort is normal. No respiratory distress.      Breath sounds: No stridor.   Skin:     General: Skin is dry.   Neurological:      Comments: Alert and oriented.    Psychiatric:         Speech: Speech normal.         Behavior: Behavior normal.           Assessment/Plan:   1. Encounter for screening for COVID-19  - COVID/SARS CoV-2 PCR; Future    Differential diagnosis, natural history, supportive care, and indications for immediate follow-up discussed.

## 2022-01-03 DIAGNOSIS — Z11.52 ENCOUNTER FOR SCREENING FOR COVID-19: ICD-10-CM

## 2022-01-03 LAB — COVID ORDER STATUS COVID19: NORMAL

## 2022-01-04 LAB
SARS-COV-2 RNA RESP QL NAA+PROBE: DETECTED
SPECIMEN SOURCE: ABNORMAL

## 2022-04-01 ENCOUNTER — OFFICE VISIT (OUTPATIENT)
Dept: URGENT CARE | Facility: PHYSICIAN GROUP | Age: 22
End: 2022-04-01
Payer: MEDICAID

## 2022-04-01 VITALS
TEMPERATURE: 98.1 F | SYSTOLIC BLOOD PRESSURE: 124 MMHG | DIASTOLIC BLOOD PRESSURE: 82 MMHG | OXYGEN SATURATION: 98 % | HEART RATE: 89 BPM | WEIGHT: 145 LBS | HEIGHT: 64 IN | BODY MASS INDEX: 24.75 KG/M2 | RESPIRATION RATE: 16 BRPM

## 2022-04-01 DIAGNOSIS — M79.671 RIGHT FOOT PAIN: ICD-10-CM

## 2022-04-01 PROCEDURE — 99214 OFFICE O/P EST MOD 30 MIN: CPT | Performed by: FAMILY MEDICINE

## 2022-04-01 RX ORDER — PREDNISONE 20 MG/1
TABLET ORAL
Qty: 10 TABLET | Refills: 0 | Status: SHIPPED | OUTPATIENT
Start: 2022-04-01 | End: 2022-08-31

## 2022-04-01 ASSESSMENT — FIBROSIS 4 INDEX: FIB4 SCORE: 0.41

## 2022-04-02 NOTE — PROGRESS NOTES
Chief Complaint:    Chief Complaint   Patient presents with   • Foot Problem     Bottom of right foot pt states lump        History of Present Illness:    Right knee surgery 3/8/22: RIGHT KNEE ARTHROSCOPY WITH LATERAL RELEASE AND OPEN MEDIAL QUADRICEPS TENDON FEMORAL LIGAMENT RECONSTRUCTION WITH SEMITENDINOSUS ALLOGRAFT, PATELLAR CHONDROPLASTY, Op report by surgeon reviewed.    Has follow-up appointment with Ortho in May 2022. Taking Ibuprofen. Wearing knee brace on right knee and using crutches.    She has had some discomfort in right forefoot, plantar aspect for about 2 weeks. She tried some lotion to this area. Recently she noticed some discoloration in this area.      Past Medical History:    Past Medical History:   Diagnosis Date   • Acute nasopharyngitis 11/2021    COVID + 10/2/21 and 11/20/21-11/26/21 felt sick with sore throat, cough, HA - negative COVID   • Allergy     Norco, seasonal   • Anxiety    • Asthma 02/25/2022    as a child; controlled w inhaler albuterol   • COVID-19 10/02/2021   • COVID-19 12/31/2021    positive   • Depression 02/25/2022   • History of depression 4/29/2019   • IBD (inflammatory bowel disease)     IBS with constipation     Past Surgical History:    Past Surgical History:   Procedure Laterality Date   • KNEE ARTHROSCOPY, OPEN MEDIAL PATELLOFEMORAL LIGAMENT RECONSTRUCTION Right 3/8/2022    Procedure: RIGHT KNEE ARTHROSCOPY WITH LATERAL RELEASE AND OPEN MEDIAL QUADRICEPS TENDON FEMORAL LIGAMENT RECONSTRUCTION WITH SEMITENDINOSUS ALLOGRAFT;  Surgeon: Ant Gifford M.D.;  Location: [facility] [place];  Service: Orthopedics   • DENTAL EXTRACTION(S) Bilateral 2019   • KNEE ARTHROSCOPY Left    • OTHER      oral, wisdom teeth pulled   • TONSILLECTOMY       Social History:    Social History     Socioeconomic History   • Marital status: Single     Spouse name: Not on file   • Number of children: 0   • Years of education: Not on file   • Highest education level: High school graduate    Occupational History   • Occupation: [employer]     Employer: [employer]   Tobacco Use   • Smoking status: Former Smoker     Quit date: 2017     Years since quittin.2   • Smokeless tobacco: Never Used   Vaping Use   • Vaping Use: Never used   Substance and Sexual Activity   • Alcohol use: Not Currently     Comment: rarely - 1-2 x a year   • Drug use: Yes     Frequency: 2.0 times per week     Types: Marijuana, Inhaled, Oral     Comment: Occasional - marijuana   • Sexual activity: Yes     Partners: Male     Birth control/protection: Implant   Other Topics Concern   • Not on file   Social History Narrative   • Not on file     Social Determinants of Health     Financial Resource Strain: Not on file   Food Insecurity: Not on file   Transportation Needs: Not on file   Physical Activity: Not on file   Stress: Not on file   Social Connections: Not on file   Intimate Partner Violence: Not on file   Housing Stability: Not on file     Family History:    Family History   Problem Relation Age of Onset   • Depression Mother    • Other Mother         hydrocephalus arnold-Chiari malformation   • Psychiatric Illness Mother         anxiety and depression   • Seizures Mother         epilepsy   • Diabetes Mother    • Hypertension Mother    • Migraines Mother    • Depression Father    • Allergies Father    • Psychiatric Illness Father         depression and bipolar   • Other Father         GERD   • Diabetes Maternal Grandfather    • Heart Disease Maternal Grandfather    • Hypertension Maternal Grandfather    • Hyperlipidemia Maternal Grandfather    • Other Sister         IBS   • Psychiatric Illness Sister         anxiety and depression   • Migraines Sister    • Cancer Maternal Aunt         pancreatic   • No Known Problems Maternal Grandmother    • No Known Problems Paternal Grandmother    • Heart Disease Paternal Grandfather    • No Known Problems Sister      Medications:    Current Outpatient Medications on File Prior to  "Visit   Medication Sig Dispense Refill   • aspirin EC (ECOTRIN) 81 MG Tablet Delayed Response Take 1 Tablet by mouth 2 times a day. 60 Tablet 0   • Spacer/Aero-Holding Chambers (EQ SPACE CHAMBER ANTI-STATIC) Device      • ondansetron (ZOFRAN ODT) 4 MG TABLET DISPERSIBLE Take 1 Tablet by mouth every 8 hours as needed for Nausea. 20 Tablet 0   • diclofenac DR (VOLTAREN) 75 MG Tablet Delayed Response Take 75 mg by mouth.     • albuterol 108 (90 Base) MCG/ACT Aero Soln inhalation aerosol Inhale 2 Puffs every four hours as needed. With spacer device 18.2 g 0   • vitamin D (CHOLECALCIFEROL) 1000 Unit (25 mcg) Tab Take 1,000 Units by mouth every day.     • etonogestrel (NEXPLANON) 68 MG Implant implant Inject 1 Each under the skin.       No current facility-administered medications on file prior to visit.     Allergies:    Allergies   Allergen Reactions   • Norco [Hydrocodone-Acetaminophen] Vomiting       Vitals:    Vitals:    04/01/22 1842   BP: 124/82   Pulse: 89   Resp: 16   Temp: 36.7 °C (98.1 °F)   TempSrc: Temporal   SpO2: 98%   Weight: 65.8 kg (145 lb)   Height: 1.626 m (5' 4\")       Physical Exam:    Constitutional: Vital signs reviewed. Appears well-developed and well-nourished. No acute distress.   Eyes: Sclera white, conjunctivae clear.  ENT: External ears normal. Hearing normal.  Pulmonary/Chest: Respirations non-labored.  Musculoskeletal: Right foot: plantar aspect around the 3rd MTP joint the skin has some dryness, but no erythema or specific rash. There is some lighter color in this area which blanches with pressure, but cap refills quickly, less than 2 seconds. I am able to firmly palpate in this area although she says there is some tenderness with this, she does not withdraw or flinch with my firm pressure in this area.  Neurological: Alert and oriented to person, place, and time. Muscle tone normal. Coordination normal. Light touch and sensation normal.   Psychiatric: Normal mood and affect. Behavior is " normal. Judgment and thought content normal.       Medical Decision Makin. Right foot pain  - predniSONE (DELTASONE) 20 MG Tab; 1 TAB BY MOUTH ONCE A DAY ONLY IF NEEDED FOR RIGHT FOOT PAIN TO HELP INFLAMMATION. TAKE WITH FOOD.  Dispense: 10 Tablet; Refill: 0      Discussed with her DDX, management options, and risks, benefits, and alternatives to treatment plan agreed upon.    Right knee surgery 3/8/22: RIGHT KNEE ARTHROSCOPY WITH LATERAL RELEASE AND OPEN MEDIAL QUADRICEPS TENDON FEMORAL LIGAMENT RECONSTRUCTION WITH SEMITENDINOSUS ALLOGRAFT, PATELLAR CHONDROPLASTY, Op report by surgeon reviewed.    Has follow-up appointment with Ortho in May 2022. Taking Ibuprofen. Wearing knee brace on right knee and using crutches.    She has had some discomfort in right forefoot, plantar aspect for about 2 weeks. She tried some lotion to this area. Recently she noticed some discoloration in this area.    Right foot: plantar aspect around the 3rd MTP joint the skin has some dryness, but no erythema or specific rash. There is some lighter color in this area which blanches with pressure, but cap refills quickly, less than 2 seconds. I am able to firmly palpate in this area although she says there is some tenderness with this, she does not withdraw or flinch with my firm pressure in this area.    ? etiology of her symptoms and exam findings.    I suspect there is inflammation in right foot as cause of symptoms. Possible altered ambulatory mechanics due to recent right knee surgery contributing.    Discussed steroid treatment on as-needed basis for anti-inflammatory effect. She would like.    Agreeable to medication prescribed.    Discussed expected course of duration, time for improvement, and to seek follow-up in Emergency Room, urgent care, or with PCP if getting worse at any time or not improving within expected time frame.

## 2022-04-23 ENCOUNTER — HOSPITAL ENCOUNTER (OUTPATIENT)
Facility: MEDICAL CENTER | Age: 22
End: 2022-04-23
Attending: FAMILY MEDICINE
Payer: MEDICAID

## 2022-04-23 ENCOUNTER — OFFICE VISIT (OUTPATIENT)
Dept: URGENT CARE | Facility: PHYSICIAN GROUP | Age: 22
End: 2022-04-23
Payer: MEDICAID

## 2022-04-23 VITALS
RESPIRATION RATE: 16 BRPM | BODY MASS INDEX: 24.75 KG/M2 | HEART RATE: 76 BPM | DIASTOLIC BLOOD PRESSURE: 72 MMHG | HEIGHT: 64 IN | TEMPERATURE: 97 F | OXYGEN SATURATION: 100 % | WEIGHT: 145 LBS | SYSTOLIC BLOOD PRESSURE: 122 MMHG

## 2022-04-23 DIAGNOSIS — I88.9 CERVICAL LYMPHADENITIS: ICD-10-CM

## 2022-04-23 PROCEDURE — 99214 OFFICE O/P EST MOD 30 MIN: CPT | Performed by: FAMILY MEDICINE

## 2022-04-23 PROCEDURE — 87070 CULTURE OTHR SPECIMN AEROBIC: CPT

## 2022-04-23 RX ORDER — AMOXICILLIN AND CLAVULANATE POTASSIUM 875; 125 MG/1; MG/1
1 TABLET, FILM COATED ORAL 2 TIMES DAILY
Qty: 14 TABLET | Refills: 0 | Status: SHIPPED | OUTPATIENT
Start: 2022-04-23 | End: 2022-04-30

## 2022-04-23 ASSESSMENT — FIBROSIS 4 INDEX: FIB4 SCORE: 0.41

## 2022-04-23 NOTE — PROGRESS NOTES
Subjective:      Chief Complaint   Patient presents with   •  left ear pain        •                Otalgia  This is a new problem.   She c/o dull, achy left external ear, left sided neck pain x 2 d.   She also notes some swelling on the left side of anterior neck.    Denies fever, chills.   Denies any dental pain or tooth pain with chewing.     Denies n/v/d.   Denies sore throat or cough.    Denies any neck stiffness.           The problem has been unchanged       She denies nasal  congestion and coughing. Pertinent negatives include no abdominal pain, chest pain, chills, fever, headaches, joint swelling, myalgias, nausea, neck pain, rash or visual change. Nothing aggravates the symptoms. She has tried nothing for the symptoms.     Social History     Tobacco Use   • Smoking status: Former Smoker     Quit date: 2017     Years since quittin.3   • Smokeless tobacco: Never Used   Vaping Use   • Vaping Use: Never used   Substance Use Topics   • Alcohol use: Not Currently     Comment: rarely - 1-2 x a year   • Drug use: Yes     Frequency: 2.0 times per week     Types: Marijuana, Inhaled, Oral     Comment: Occasional - marijuana         Current Outpatient Medications on File Prior to Visit   Medication Sig Dispense Refill   • predniSONE (DELTASONE) 20 MG Tab 1 TAB BY MOUTH ONCE A DAY ONLY IF NEEDED FOR RIGHT FOOT PAIN TO HELP INFLAMMATION. TAKE WITH FOOD. 10 Tablet 0   • aspirin EC (ECOTRIN) 81 MG Tablet Delayed Response Take 1 Tablet by mouth 2 times a day. 60 Tablet 0   • Spacer/Aero-Holding Chambers (EQ SPACE CHAMBER ANTI-STATIC) Device      • ondansetron (ZOFRAN ODT) 4 MG TABLET DISPERSIBLE Take 1 Tablet by mouth every 8 hours as needed for Nausea. 20 Tablet 0   • diclofenac DR (VOLTAREN) 75 MG Tablet Delayed Response Take 75 mg by mouth.     • albuterol 108 (90 Base) MCG/ACT Aero Soln inhalation aerosol Inhale 2 Puffs every four hours as needed. With spacer device 18.2 g 0   • vitamin D (CHOLECALCIFEROL) 1000  Unit (25 mcg) Tab Take 1,000 Units by mouth every day.     • etonogestrel (NEXPLANON) 68 MG Implant implant Inject 1 Each under the skin.       No current facility-administered medications on file prior to visit.         Past Medical History:   Diagnosis Date   • Asthma 02/25/2022    as a child; controlled w inhaler albuterol   • Depression 02/25/2022   • COVID-19 12/31/2021    positive   • Acute nasopharyngitis 11/2021    COVID + 10/2/21 and 11/20/21-11/26/21 felt sick with sore throat, cough, HA - negative COVID   • COVID-19 10/02/2021   • History of depression 4/29/2019   • Allergy     Norco, seasonal   • Anxiety    • IBD (inflammatory bowel disease)     IBS with constipation         Family History   Problem Relation Age of Onset   • Depression Mother    • Other Mother         hydrocephalus arnold-Chiari malformation   • Psychiatric Illness Mother         anxiety and depression   • Seizures Mother         epilepsy   • Diabetes Mother    • Hypertension Mother    • Migraines Mother    • Depression Father    • Allergies Father    • Psychiatric Illness Father         depression and bipolar   • Other Father         GERD   • Diabetes Maternal Grandfather    • Heart Disease Maternal Grandfather    • Hypertension Maternal Grandfather    • Hyperlipidemia Maternal Grandfather    • Other Sister         IBS   • Psychiatric Illness Sister         anxiety and depression   • Migraines Sister    • Cancer Maternal Aunt         pancreatic   • No Known Problems Maternal Grandmother    • No Known Problems Paternal Grandmother    • Heart Disease Paternal Grandfather    • No Known Problems Sister           Review of Systems   Constitutional: +fatigue  HENT: Positive for congestion and ear pain. Negative for hearing loss and tinnitus.    Respiratory:   Negative for hemoptysis, shortness of breath and wheezing.    Cardiovascular: Negative for chest pain, palpitations and leg swelling.   Gastrointestinal: Negative for nausea and  "abdominal pain.   Musculoskeletal: Negative for myalgias, joint swelling and neck pain.   Skin: Negative for rash.   Neurological: Negative for headaches.   All other systems reviewed and are negative.         Objective:     /72   Pulse 76   Temp 36.1 °C (97 °F) (Temporal)   Resp 16   Ht 1.626 m (5' 4\")   Wt 65.8 kg (145 lb)   SpO2 100%     Physical Exam   Constitutional: Vital signs are normal.  No distress.   HENT:   Head: There is normal jaw occlusion.   Right Ear: External ear normal. Tympanic membrane is normal.   Left Ear: External ear normal. Tympanic membrane is clear. No middle ear effusion.      Nose:  . No nasal discharge.   Mouth/Throat: Mucous membranes are moist. No oral lesions.  . No oropharyngeal exudate, pharynx swelling or pharynx petechiae. Tonsils are absent.   Left parotid gland is not tender to touch or swollen  Eyes: Conjunctivae and EOM are normal. Pupils are equal, round, and reactive to light. Right eye exhibits no discharge. Left eye exhibits no discharge.   Neck: Normal range of motion. Neck supple. Left enlarged, tender cervical, submandibular LAD noted.     Cardiovascular: Normal rate and regular rhythm.  Pulses are palpable.    No murmur heard.  Pulmonary/Chest: Effort normal and breath sounds normal. There is normal air entry. No respiratory distress. no wheezes, rhonchi,  retraction.   Musculoskeletal:   no edema.   Neurological: A/O x 3.   CN 2-12 intact   Skin: Skin is warm. Capillary refill takes less than 3 seconds. No purpura and no rash noted. Patient is not diaphoretic. No jaundice or pallor.   Nursing note and vitals reviewed.              Assessment/Plan:      1. Cervical lymphadenitis     Unclear source of infection    Will tx empirically with augmentin    Throat cx ordered to r/o strep throat.       - amoxicillin-clavulanate (AUGMENTIN) 875-125 MG Tab; Take 1 Tablet by mouth 2 times a day for 7 days.  Dispense: 14 Tablet; Refill: 0  - CULTURE THROAT; " Future    Follow up in one week if no improvement, sooner if symptoms worsen.

## 2022-04-25 LAB
BACTERIA SPEC RESP CULT: NORMAL
SIGNIFICANT IND 70042: NORMAL
SITE SITE: NORMAL
SOURCE SOURCE: NORMAL

## 2022-08-25 ENCOUNTER — OFFICE VISIT (OUTPATIENT)
Dept: URGENT CARE | Facility: PHYSICIAN GROUP | Age: 22
End: 2022-08-25
Payer: MEDICAID

## 2022-08-25 ENCOUNTER — HOSPITAL ENCOUNTER (OUTPATIENT)
Facility: MEDICAL CENTER | Age: 22
End: 2022-08-25
Attending: NURSE PRACTITIONER
Payer: MEDICAID

## 2022-08-25 VITALS
OXYGEN SATURATION: 100 % | BODY MASS INDEX: 24.92 KG/M2 | HEIGHT: 64 IN | WEIGHT: 146 LBS | DIASTOLIC BLOOD PRESSURE: 90 MMHG | SYSTOLIC BLOOD PRESSURE: 120 MMHG | HEART RATE: 109 BPM | RESPIRATION RATE: 16 BRPM | TEMPERATURE: 98 F

## 2022-08-25 DIAGNOSIS — J30.89 ALLERGIC RHINITIS DUE TO OTHER ALLERGIC TRIGGER, UNSPECIFIED SEASONALITY: ICD-10-CM

## 2022-08-25 DIAGNOSIS — N76.0 ACUTE VAGINITIS: ICD-10-CM

## 2022-08-25 DIAGNOSIS — R39.9 UTI SYMPTOMS: ICD-10-CM

## 2022-08-25 DIAGNOSIS — J01.00 ACUTE NON-RECURRENT MAXILLARY SINUSITIS: ICD-10-CM

## 2022-08-25 DIAGNOSIS — H65.193 ACUTE EFFUSION OF BOTH MIDDLE EARS: ICD-10-CM

## 2022-08-25 LAB
APPEARANCE UR: CLEAR
BILIRUB UR STRIP-MCNC: NORMAL MG/DL
COLOR UR AUTO: YELLOW
GLUCOSE UR STRIP.AUTO-MCNC: NORMAL MG/DL
INT CON NEG: NORMAL
INT CON POS: NORMAL
KETONES UR STRIP.AUTO-MCNC: NORMAL MG/DL
LEUKOCYTE ESTERASE UR QL STRIP.AUTO: NORMAL
NITRITE UR QL STRIP.AUTO: NORMAL
PH UR STRIP.AUTO: 7 [PH] (ref 5–8)
POC URINE PREGNANCY TEST: NORMAL
PROT UR QL STRIP: NORMAL MG/DL
RBC UR QL AUTO: NORMAL
SP GR UR STRIP.AUTO: 1.02
UROBILINOGEN UR STRIP-MCNC: 0.2 MG/DL

## 2022-08-25 PROCEDURE — 87660 TRICHOMONAS VAGIN DIR PROBE: CPT

## 2022-08-25 PROCEDURE — 87491 CHLMYD TRACH DNA AMP PROBE: CPT

## 2022-08-25 PROCEDURE — 87591 N.GONORRHOEAE DNA AMP PROB: CPT

## 2022-08-25 PROCEDURE — U0003 INFECTIOUS AGENT DETECTION BY NUCLEIC ACID (DNA OR RNA); SEVERE ACUTE RESPIRATORY SYNDROME CORONAVIRUS 2 (SARS-COV-2) (CORONAVIRUS DISEASE [COVID-19]), AMPLIFIED PROBE TECHNIQUE, MAKING USE OF HIGH THROUGHPUT TECHNOLOGIES AS DESCRIBED BY CMS-2020-01-R: HCPCS

## 2022-08-25 PROCEDURE — 99213 OFFICE O/P EST LOW 20 MIN: CPT | Performed by: NURSE PRACTITIONER

## 2022-08-25 PROCEDURE — 87510 GARDNER VAG DNA DIR PROBE: CPT

## 2022-08-25 PROCEDURE — 87480 CANDIDA DNA DIR PROBE: CPT

## 2022-08-25 PROCEDURE — 81025 URINE PREGNANCY TEST: CPT | Performed by: NURSE PRACTITIONER

## 2022-08-25 PROCEDURE — 81002 URINALYSIS NONAUTO W/O SCOPE: CPT | Performed by: NURSE PRACTITIONER

## 2022-08-25 PROCEDURE — U0005 INFEC AGEN DETEC AMPLI PROBE: HCPCS

## 2022-08-25 RX ORDER — METHYLPREDNISOLONE 4 MG/1
TABLET ORAL
Qty: 21 TABLET | Refills: 0 | Status: SHIPPED | OUTPATIENT
Start: 2022-08-25 | End: 2022-10-21

## 2022-08-25 RX ORDER — AMOXICILLIN AND CLAVULANATE POTASSIUM 875; 125 MG/1; MG/1
1 TABLET, FILM COATED ORAL 2 TIMES DAILY
Qty: 14 TABLET | Refills: 0 | Status: SHIPPED | OUTPATIENT
Start: 2022-08-25 | End: 2022-09-01

## 2022-08-25 ASSESSMENT — FIBROSIS 4 INDEX: FIB4 SCORE: 0.41

## 2022-08-25 ASSESSMENT — ENCOUNTER SYMPTOMS
COUGH: 1
SHORTNESS OF BREATH: 0
FEVER: 0
WHEEZING: 0
ABDOMINAL PAIN: 0

## 2022-08-26 DIAGNOSIS — J30.89 ALLERGIC RHINITIS DUE TO OTHER ALLERGIC TRIGGER, UNSPECIFIED SEASONALITY: ICD-10-CM

## 2022-08-26 DIAGNOSIS — R30.0 DYSURIA: ICD-10-CM

## 2022-08-26 LAB — COVID ORDER STATUS COVID19: NORMAL

## 2022-08-26 NOTE — PROGRESS NOTES
Subjective:     Vita Giordano is a 22 y.o. female who presents for Seasonal Allergies (X2weeks ) and Dysuria (X4days )      Dysuria x 3-4 days. Back pain. No fever. LMP 1 week ago. Vaginal discharge, clear white. No rash or itch.     Also states she has bad allergies x 2 weeks. Taking allergy medications and benadryl. Puffy eyes in the morning. Itchy throat. Clear nasal drainage. Negative COVID tests x 2. Occasional cough. No new pets.     Dysuria   This is a new problem. The current episode started in the past 7 days. She is Sexually active. Pertinent negatives include no frequency or hematuria.   Sinusitis  Associated symptoms include congestion and coughing. Pertinent negatives include no shortness of breath.     Past Medical History:   Diagnosis Date    Acute nasopharyngitis 11/2021    COVID + 10/2/21 and 11/20/21-11/26/21 felt sick with sore throat, cough, HA - negative COVID    Allergy     Norco, seasonal    Anxiety     Asthma 02/25/2022    as a child; controlled w inhaler albuterol    COVID-19 10/02/2021    COVID-19 12/31/2021    positive    Depression 02/25/2022    History of depression 4/29/2019    IBD (inflammatory bowel disease)     IBS with constipation       Past Surgical History:   Procedure Laterality Date    KNEE ARTHROSCOPY, OPEN MEDIAL PATELLOFEMORAL LIGAMENT RECONSTRUCTION Right 3/8/2022    Procedure: RIGHT KNEE ARTHROSCOPY WITH LATERAL RELEASE AND OPEN MEDIAL QUADRICEPS TENDON FEMORAL LIGAMENT RECONSTRUCTION WITH SEMITENDINOSUS ALLOGRAFT;  Surgeon: Ant Gifford M.D.;  Location: [facility];  Service: Orthopedics    DENTAL EXTRACTION(S) Bilateral 2019    KNEE ARTHROSCOPY Left     OTHER      oral, wisdom teeth pulled    TONSILLECTOMY         Social History     Socioeconomic History    Marital status: Single     Spouse name: Not on file    Number of children: 0    Years of education: Not on file    Highest education level: High school graduate   Occupational History    Occupation:       Employer: [employer]   Tobacco Use    Smoking status: Former     Types: Cigarettes     Quit date: 2017     Years since quittin.6    Smokeless tobacco: Never   Vaping Use    Vaping Use: Never used   Substance and Sexual Activity    Alcohol use: Not Currently     Comment: rarely - 1-2 x a year    Drug use: Yes     Frequency: 2.0 times per week     Types: Marijuana, Inhaled, Oral     Comment: Occasional - marijuana    Sexual activity: Yes     Partners: Male     Birth control/protection: Implant   Other Topics Concern    Not on file   Social History Narrative    Not on file     Social Determinants of Health     Financial Resource Strain: Not on file   Food Insecurity: Not on file   Transportation Needs: Not on file   Physical Activity: Not on file   Stress: Not on file   Social Connections: Not on file   Intimate Partner Violence: Not on file   Housing Stability: Not on file        Family History   Problem Relation Age of Onset    Depression Mother     Other Mother         hydrocephalus arnold-Chiari malformation    Psychiatric Illness Mother         anxiety and depression    Seizures Mother         epilepsy    Diabetes Mother     Hypertension Mother     Migraines Mother     Depression Father     Allergies Father     Psychiatric Illness Father         depression and bipolar    Other Father         GERD    Diabetes Maternal Grandfather     Heart Disease Maternal Grandfather     Hypertension Maternal Grandfather     Hyperlipidemia Maternal Grandfather     Other Sister         IBS    Psychiatric Illness Sister         anxiety and depression    Migraines Sister     Cancer Maternal Aunt         pancreatic    No Known Problems Maternal Grandmother     No Known Problems Paternal Grandmother     Heart Disease Paternal Grandfather     No Known Problems Sister         Allergies   Allergen Reactions    Norco [Hydrocodone-Acetaminophen] Vomiting       Review of Systems   Constitutional:  Negative for fever.  "  HENT:  Positive for congestion. Negative for ear discharge.    Respiratory:  Positive for cough. Negative for shortness of breath and wheezing.    Gastrointestinal:  Negative for abdominal pain.   Genitourinary:  Positive for dysuria. Negative for frequency and hematuria.   Endo/Heme/Allergies:  Positive for environmental allergies.   All other systems reviewed and are negative.     Objective:   BP (!) 120/90   Pulse (!) 109   Temp 36.7 °C (98 °F) (Temporal)   Resp 16   Ht 1.626 m (5' 4\")   Wt 66.2 kg (146 lb)   SpO2 100%   BMI 25.06 kg/m²     Physical Exam  Vitals reviewed.   Constitutional:       General: She is not in acute distress.     Appearance: She is well-developed. She is ill-appearing. She is not toxic-appearing.   HENT:      Head: Normocephalic and atraumatic.      Right Ear: External ear normal. A middle ear effusion is present. Tympanic membrane is not perforated or erythematous.      Left Ear: External ear normal. A middle ear effusion is present. Tympanic membrane is not perforated or erythematous.      Ears:      Comments: Cloudy effusions bilaterally.      Nose: Rhinorrhea present.      Right Sinus: Maxillary sinus tenderness present.      Left Sinus: Maxillary sinus tenderness present.      Comments: Chaped nares.      Mouth/Throat:      Mouth: Mucous membranes are moist.   Eyes:      Conjunctiva/sclera: Conjunctivae normal.   Cardiovascular:      Rate and Rhythm: Normal rate.   Pulmonary:      Effort: Pulmonary effort is normal. No respiratory distress.      Breath sounds: Normal breath sounds. No stridor. No wheezing, rhonchi or rales.   Musculoskeletal:         General: Normal range of motion.      Cervical back: Normal range of motion and neck supple.   Skin:     General: Skin is warm and dry.      Findings: No rash.   Neurological:      General: No focal deficit present.      Mental Status: She is alert and oriented to person, place, and time.      GCS: GCS eye subscore is 4. GCS " verbal subscore is 5. GCS motor subscore is 6.   Psychiatric:         Mood and Affect: Mood normal.         Speech: Speech normal.         Behavior: Behavior normal.         Thought Content: Thought content normal.         Judgment: Judgment normal.       Assessment/Plan:   1. UTI symptoms  - POCT Urinalysis  - POCT Pregnancy    2. Acute vaginitis  - VAGINAL PATHOGENS DNA PANEL; Future    3. Acute non-recurrent maxillary sinusitis  - SARS-CoV-2 PCR (24 hour In-House): Collect NP swab in VTM; Future  - methylPREDNISolone (MEDROL DOSEPAK) 4 MG Tablet Therapy Pack; Follow schedule on package instructions.  Dispense: 21 Tablet; Refill: 0  - amoxicillin-clavulanate (AUGMENTIN) 875-125 MG Tab; Take 1 Tablet by mouth 2 times a day for 7 days.  Dispense: 14 Tablet; Refill: 0    4. Allergic rhinitis due to other allergic trigger, unspecified seasonality    5. Acute effusion of both middle ears  - methylPREDNISolone (MEDROL DOSEPAK) 4 MG Tablet Therapy Pack; Follow schedule on package instructions.  Dispense: 21 Tablet; Refill: 0  Results for orders placed or performed during the hospital encounter of 08/25/22   SARS-CoV-2 PCR (24 hour In-House): Collect NP swab in VTM    Specimen: Respirate   Result Value Ref Range    SARS-CoV-2 Source Nasal Swab     SARS-CoV-2 by PCR NotDetected    VAGINAL PATHOGENS DNA PANEL   Result Value Ref Range    Candida species DNA Probe Negative Negative    Trichamonas vaginalis DNA Probe Negative Negative    Gardnerella vaginalis DNA Probe Negative Negative   COVID/SARS CoV-2 PCR   Result Value Ref Range    COVID Order Status Received    Chlamydia/GC, PCR (Genital/Anal swab)   Result Value Ref Range    C. trachomatis by PCR Negative Negative    N. gonorrhoeae by PCR Negative Negative    Source Genital    -Nasal spray and allergy medications as directed (Zyrtec or Loratadine).  -You may try saline irrigation or neti pot.   -Drink plenty of fluids.  -Ibuprofen or Tylenol as directed for pain.    -Warm compress to sinuses.     Follow up with primary care provider. Urgently for worsening symptoms, persistent fevers, facial swelling, visual changes, weakness, elevated heart rate, stiff neck, persistent earache, new or persistent abdominal pain, flank pain, difficulty with urination, or any other concerns.    -Sinusitis x 2 weeks. Given contingent oral antibiotic for worsening symptoms. Discussed symptoms of bacterial sinusitis. Negative UA, recommend r/o BV with vaginal discharge and dysuria.     Differential diagnosis, natural history, supportive care, and indications for immediate follow-up discussed.

## 2022-08-27 LAB
C TRACH DNA GENITAL QL NAA+PROBE: NEGATIVE
CANDIDA DNA VAG QL PROBE+SIG AMP: NEGATIVE
G VAGINALIS DNA VAG QL PROBE+SIG AMP: NEGATIVE
N GONORRHOEA DNA GENITAL QL NAA+PROBE: NEGATIVE
SARS-COV-2 RNA RESP QL NAA+PROBE: NOTDETECTED
SPECIMEN SOURCE: NORMAL
SPECIMEN SOURCE: NORMAL
T VAGINALIS DNA VAG QL PROBE+SIG AMP: NEGATIVE

## 2022-10-21 ENCOUNTER — OFFICE VISIT (OUTPATIENT)
Dept: URGENT CARE | Facility: PHYSICIAN GROUP | Age: 22
End: 2022-10-21
Payer: MEDICAID

## 2022-10-21 VITALS
DIASTOLIC BLOOD PRESSURE: 80 MMHG | BODY MASS INDEX: 27.66 KG/M2 | HEART RATE: 88 BPM | TEMPERATURE: 98 F | SYSTOLIC BLOOD PRESSURE: 120 MMHG | WEIGHT: 162 LBS | HEIGHT: 64 IN | RESPIRATION RATE: 14 BRPM | OXYGEN SATURATION: 97 %

## 2022-10-21 DIAGNOSIS — R51.9 ACUTE INTRACTABLE HEADACHE, UNSPECIFIED HEADACHE TYPE: ICD-10-CM

## 2022-10-21 DIAGNOSIS — R11.0 NAUSEA: ICD-10-CM

## 2022-10-21 PROCEDURE — 99213 OFFICE O/P EST LOW 20 MIN: CPT | Performed by: FAMILY MEDICINE

## 2022-10-21 RX ORDER — ONDANSETRON 4 MG/1
TABLET, ORALLY DISINTEGRATING ORAL
Qty: 15 TABLET | Refills: 0 | Status: SHIPPED | OUTPATIENT
Start: 2022-10-21 | End: 2022-11-16

## 2022-10-21 RX ORDER — KETOROLAC TROMETHAMINE 30 MG/ML
45 INJECTION, SOLUTION INTRAMUSCULAR; INTRAVENOUS ONCE
Status: COMPLETED | OUTPATIENT
Start: 2022-10-21 | End: 2022-10-21

## 2022-10-21 RX ORDER — BUTALBITAL, ACETAMINOPHEN AND CAFFEINE 50; 325; 40 MG/1; MG/1; MG/1
1 TABLET ORAL EVERY 6 HOURS PRN
Qty: 20 TABLET | Refills: 0 | Status: SHIPPED | OUTPATIENT
Start: 2022-10-21 | End: 2022-10-26

## 2022-10-21 RX ORDER — ONDANSETRON 4 MG/1
4 TABLET, ORALLY DISINTEGRATING ORAL ONCE
Status: COMPLETED | OUTPATIENT
Start: 2022-10-21 | End: 2022-10-21

## 2022-10-21 RX ADMIN — ONDANSETRON 4 MG: 4 TABLET, ORALLY DISINTEGRATING ORAL at 11:05

## 2022-10-21 RX ADMIN — KETOROLAC TROMETHAMINE 45 MG: 30 INJECTION, SOLUTION INTRAMUSCULAR; INTRAVENOUS at 11:04

## 2022-10-21 ASSESSMENT — FIBROSIS 4 INDEX: FIB4 SCORE: 0.41

## 2022-10-21 NOTE — PROGRESS NOTES
Chief Complaint:    Chief Complaint   Patient presents with    Headache     Persistent headache x3days on the right side. Pt states pain is throbbing, affecting right ear and right side of face.        History of Present Illness:    Symptoms x 3 days; has right sided throbbing/pulsating headache, photophobia, and nausea. Tried Excedrin and Ibuprofen without help. Does not definitely have history of migraines, but sister does. No fever, nasal symptoms, significant sore throat, or cough. Would like Zofran for nausea.      Past Medical History:    Past Medical History:   Diagnosis Date    Acute nasopharyngitis 11/2021    COVID + 10/2/21 and 11/20/21-11/26/21 felt sick with sore throat, cough, HA - negative COVID    Allergy     Norco, seasonal    Anxiety     Asthma 02/25/2022    as a child; controlled w inhaler albuterol    COVID-19 10/02/2021    COVID-19 12/31/2021    positive    Depression 02/25/2022    History of depression 4/29/2019    IBD (inflammatory bowel disease)     IBS with constipation     Past Surgical History:    Past Surgical History:   Procedure Laterality Date    KNEE ARTHROSCOPY, OPEN MEDIAL PATELLOFEMORAL LIGAMENT RECONSTRUCTION Right 3/8/2022    Procedure: RIGHT KNEE ARTHROSCOPY WITH LATERAL RELEASE AND OPEN MEDIAL QUADRICEPS TENDON FEMORAL LIGAMENT RECONSTRUCTION WITH SEMITENDINOSUS ALLOGRAFT;  Surgeon: Ant Gifford M.D.;  Location: [facility];  Service: Orthopedics    DENTAL EXTRACTION(S) Bilateral 2019    KNEE ARTHROSCOPY Left     OTHER      oral, wisdom teeth pulled    TONSILLECTOMY       Social History:    Social History     Socioeconomic History    Marital status: Single     Spouse name: Not on file    Number of children: 0    Years of education: Not on file    Highest education level: High school graduate   Occupational History    Occupation: [employer]     Employer: [employer]   Tobacco Use    Smoking status: Former     Types: Cigarettes     Quit date: 1/1/2017     Years since quitting:  5.8    Smokeless tobacco: Never   Vaping Use    Vaping Use: Never used   Substance and Sexual Activity    Alcohol use: Not Currently     Comment: rarely - 1-2 x a year    Drug use: Yes     Frequency: 2.0 times per week     Types: Marijuana, Inhaled, Oral     Comment: Occasional - marijuana    Sexual activity: Yes     Partners: Male     Birth control/protection: Implant   Other Topics Concern    Not on file   Social History Narrative    Not on file     Social Determinants of Health     Financial Resource Strain: Not on file   Food Insecurity: Not on file   Transportation Needs: Not on file   Physical Activity: Not on file   Stress: Not on file   Social Connections: Not on file   Intimate Partner Violence: Not on file   Housing Stability: Not on file     Family History:    Family History   Problem Relation Age of Onset    Depression Mother     Other Mother         hydrocephalus arnold-Chiari malformation    Psychiatric Illness Mother         anxiety and depression    Seizures Mother         epilepsy    Diabetes Mother     Hypertension Mother     Migraines Mother     Depression Father     Allergies Father     Psychiatric Illness Father         depression and bipolar    Other Father         GERD    Diabetes Maternal Grandfather     Heart Disease Maternal Grandfather     Hypertension Maternal Grandfather     Hyperlipidemia Maternal Grandfather     Other Sister         IBS    Psychiatric Illness Sister         anxiety and depression    Migraines Sister     Cancer Maternal Aunt         pancreatic    No Known Problems Maternal Grandmother     No Known Problems Paternal Grandmother     Heart Disease Paternal Grandfather     No Known Problems Sister      Medications:    Current Outpatient Medications on File Prior to Visit   Medication Sig Dispense Refill    etonogestrel (NEXPLANON) 68 MG Implant implant Inject 1 Each under the skin.       No current facility-administered medications on file prior to visit.  "    Allergies:    Allergies   Allergen Reactions    Norco [Hydrocodone-Acetaminophen] Vomiting       Vitals:    Vitals:    10/21/22 1035   BP: 120/80   Pulse: 88   Resp: 14   Temp: 36.7 °C (98 °F)   TempSrc: Temporal   SpO2: 97%   Weight: 73.5 kg (162 lb)   Height: 1.626 m (5' 4\")       Physical Exam:    Constitutional: Vital signs reviewed. Appears well-developed and well-nourished. Prefers darkened room.  Eyes: Positive bilateral photophobia. Sclera white, conjunctivae clear. PERRLA.  ENT: External ears normal. External auditory canals normal without discharge. TMs translucent and non-bulging. Hearing normal. Lips/teeth are normal. Oral mucosa pink and moist. Posterior pharynx: WNL.  Neck: Neck supple.   Cardiovascular: Regular rate and rhythm. No murmur.  Pulmonary/Chest: Respirations non-labored. Clear to auscultation bilaterally.  Musculoskeletal: Normal gait. Normal range of motion. No muscular atrophy or weakness.  Neurological: Alert and oriented to person, place, and time. CN 2-12 intact. Muscle tone normal. Coordination normal. Normal cerebellar exam.  Skin: No rashes or lesions. Warm, dry, normal turgor.  Psychiatric: Normal mood and affect. Behavior is normal. Judgment and thought content normal.       Medical Decision Makin. Acute intractable headache, unspecified headache type  - ketorolac (TORADOL) injection 45 mg  - butalbital/apap/caffeine (FIORICET) -40 mg Tab; Take 1 Tablet by mouth every 6 hours as needed for Headache for up to 5 days.  Dispense: 20 Tablet; Refill: 0    2. Nausea  - ondansetron (ZOFRAN ODT) dispertab 4 mg  - ondansetron (ZOFRAN ODT) 4 MG TABLET DISPERSIBLE; 1 TAB, ALLOW TO DISINTEGRATE IN MOUTH, EVERY 6 HOURS ONLY IF NEEDED FOR NAUSEA OR VOMITING.  Dispense: 15 Tablet; Refill: 0       Work note given - excuse for 10/21/22.    Discussed with her DDX, management options, and risks, benefits, and alternatives to treatment plan agreed upon.    Symptoms x 3 days; has " right sided throbbing/pulsating headache, photophobia, and nausea. Tried Excedrin and Ibuprofen without help. Does not definitely have history of migraines, but sister does. No fever, nasal symptoms, significant sore throat, or cough. Would like Zofran for nausea.    Prefers darkened room. Positive bilateral photophobia.    Likely migraine as cause of symptoms.    Agreeable to medications given and prescribed.  report checked - last Rx was Oxycodone-APAP 5-325 mg #40 on 3/25/22.    In my professional judgment, after considering each of the factors set forth in , the CS is medically necessary and appropriate.    Discussed expected course of duration, time for improvement, and to seek follow-up in Emergency Room, urgent care, or with PCP if getting worse at any time or not improving within expected time frame.

## 2022-10-21 NOTE — LETTER
October 21, 2022         Patient: Vita Giordano   YOB: 2000   Date of Visit: 10/21/2022           To Whom it May Concern:    Vita Giordano was seen in my clinic on 10/21/2022.     Please excuse from work for 10/21/22 due to medical condition.    If you have any questions or concerns, please don't hesitate to call.        Sincerely,           Santos Anguiano M.D.  Electronically Signed

## 2022-11-16 ENCOUNTER — OFFICE VISIT (OUTPATIENT)
Dept: URGENT CARE | Facility: PHYSICIAN GROUP | Age: 22
End: 2022-11-16
Payer: MEDICAID

## 2022-11-16 VITALS
TEMPERATURE: 97.8 F | DIASTOLIC BLOOD PRESSURE: 70 MMHG | OXYGEN SATURATION: 97 % | WEIGHT: 162 LBS | HEIGHT: 64 IN | SYSTOLIC BLOOD PRESSURE: 112 MMHG | RESPIRATION RATE: 16 BRPM | HEART RATE: 76 BPM | BODY MASS INDEX: 27.66 KG/M2

## 2022-11-16 DIAGNOSIS — J02.9 PHARYNGITIS, UNSPECIFIED ETIOLOGY: ICD-10-CM

## 2022-11-16 DIAGNOSIS — R09.82 POSTNASAL DRIP: ICD-10-CM

## 2022-11-16 DIAGNOSIS — Z11.52 ENCOUNTER FOR SCREENING FOR COVID-19: ICD-10-CM

## 2022-11-16 LAB
EXTERNAL QUALITY CONTROL: NORMAL
INT CON NEG: NORMAL
INT CON NEG: NORMAL
INT CON POS: NORMAL
INT CON POS: NORMAL
S PYO AG THROAT QL: NEGATIVE
SARS-COV+SARS-COV-2 AG RESP QL IA.RAPID: NEGATIVE

## 2022-11-16 PROCEDURE — 99213 OFFICE O/P EST LOW 20 MIN: CPT | Mod: CS | Performed by: PHYSICIAN ASSISTANT

## 2022-11-16 PROCEDURE — 87880 STREP A ASSAY W/OPTIC: CPT | Performed by: PHYSICIAN ASSISTANT

## 2022-11-16 PROCEDURE — 87426 SARSCOV CORONAVIRUS AG IA: CPT | Performed by: PHYSICIAN ASSISTANT

## 2022-11-16 RX ORDER — FLUTICASONE PROPIONATE 50 MCG
1 SPRAY, SUSPENSION (ML) NASAL DAILY
Qty: 16 G | Refills: 0 | Status: SHIPPED | OUTPATIENT
Start: 2022-11-16 | End: 2023-07-25

## 2022-11-16 RX ORDER — KETOROLAC TROMETHAMINE 30 MG/ML
INJECTION, SOLUTION INTRAMUSCULAR; INTRAVENOUS
COMMUNITY
Start: 2022-10-21 | End: 2022-11-16

## 2022-11-16 RX ORDER — METHYLPREDNISOLONE 4 MG/1
4 TABLET ORAL DAILY
Qty: 21 TABLET | Refills: 0 | Status: SHIPPED | OUTPATIENT
Start: 2022-11-16 | End: 2023-07-25

## 2022-11-16 ASSESSMENT — ENCOUNTER SYMPTOMS
EYE PAIN: 0
CHILLS: 0
DIAPHORESIS: 0
EYE DISCHARGE: 0
SINUS PAIN: 0
NAUSEA: 0
DIARRHEA: 0
WHEEZING: 0
CONSTIPATION: 0
VOMITING: 0
SHORTNESS OF BREATH: 0
EYE REDNESS: 0
DIZZINESS: 0
SORE THROAT: 1
COUGH: 0
FEVER: 0
HEADACHES: 1
ABDOMINAL PAIN: 0

## 2022-11-16 ASSESSMENT — FIBROSIS 4 INDEX: FIB4 SCORE: 0.41

## 2022-11-16 NOTE — LETTER
LA[facility]  [facility] [address] [address]     November 16, 2022    Patient: Vita Giordano   YOB: 2000   Date of Visit: 11/16/2022       To Whom It May Concern:    Vita Giordano was seen and treated in our department on 11/16/2022.  Please excuse from work on 11/16/2022, can return thereafter    Sincerely,     Dereje Reyes P.A.-C.

## 2022-11-16 NOTE — PROGRESS NOTES
"  Subjective:     Vita Giordano  is a 22 y.o. female who presents for Pharyngitis (Post nasal drip, sore throat began this morning ), Congestion, and Headache       She presents today with sore throat, congestion, headache x1 day.  She does feel that her sore throat is related to postnasal drip.  She is also suffering from laryngitis.  Denies any recent known close sick contacts.  No history of asthma or other pulmonary pathology.  Denies fever/chills/sweats, chest pain, shortness of breath, nausea/vomiting, abdominal pain, diarrhea.  Did take allergy medication yesterday for symptoms     Review of Systems   Constitutional:  Negative for chills, diaphoresis, fever and malaise/fatigue.   HENT:  Positive for congestion and sore throat. Negative for ear discharge and sinus pain.    Eyes:  Negative for pain, discharge and redness.   Respiratory:  Negative for cough, shortness of breath and wheezing.    Cardiovascular:  Negative for chest pain.   Gastrointestinal:  Negative for abdominal pain, constipation, diarrhea, nausea and vomiting.   Genitourinary:  Negative for dysuria, frequency and urgency.   Neurological:  Positive for headaches. Negative for dizziness.    Allergies   Allergen Reactions    Norco [Hydrocodone-Acetaminophen] Vomiting     Past Medical History:   Diagnosis Date    Acute nasopharyngitis 11/2021    COVID + 10/2/21 and 11/20/21-11/26/21 felt sick with sore throat, cough, HA - negative COVID    Allergy     Norco, seasonal    Anxiety     Asthma 02/25/2022    as a child; controlled w inhaler albuterol    COVID-19 10/02/2021    COVID-19 12/31/2021    positive    Depression 02/25/2022    History of depression 4/29/2019    IBD (inflammatory bowel disease)     IBS with constipation        Objective:   /70   Pulse 76   Temp 36.6 °C (97.8 °F) (Temporal)   Resp 16   Ht 1.626 m (5' 4\")   Wt 73.5 kg (162 lb)   SpO2 97%   BMI 27.81 kg/m²   Physical Exam  Vitals and nursing note reviewed. "   Constitutional:       General: She is not in acute distress.     Appearance: Normal appearance. She is not ill-appearing, toxic-appearing or diaphoretic.   HENT:      Head: Normocephalic.      Right Ear: Tympanic membrane, ear canal and external ear normal. There is no impacted cerumen.      Left Ear: Tympanic membrane, ear canal and external ear normal. There is no impacted cerumen.      Nose: Congestion present. No rhinorrhea.      Mouth/Throat:      Mouth: Mucous membranes are moist.      Pharynx: No oropharyngeal exudate or posterior oropharyngeal erythema.   Eyes:      General:         Right eye: No discharge.         Left eye: No discharge.      Conjunctiva/sclera: Conjunctivae normal.   Cardiovascular:      Rate and Rhythm: Normal rate and regular rhythm.   Pulmonary:      Effort: Pulmonary effort is normal. No respiratory distress.      Breath sounds: Normal breath sounds. No stridor. No wheezing or rhonchi.   Musculoskeletal:      Cervical back: Neck supple.   Lymphadenopathy:      Cervical: No cervical adenopathy.   Neurological:      General: No focal deficit present.      Mental Status: She is alert and oriented to person, place, and time.   Psychiatric:         Mood and Affect: Mood normal.         Behavior: Behavior normal.         Thought Content: Thought content normal.         Judgment: Judgment normal.           Diagnostic testing:    POC COVID-negative    POC strep-negative    Assessment/Plan:     Encounter Diagnoses   Name Primary?    Pharyngitis, unspecified etiology     Postnasal drip     Encounter for screening for COVID-19         Plan for care for today's complaint includes intranasal Flonase and Medrol Dosepak for her pharyngitis and postnasal drip.  Negative strep, negative COVID today.  Symptoms are likely related to virus or allergies.  Work note was provided today.  Continue to monitor symptoms and return to urgent care or follow-up with primary care provider if symptoms remain  ongoing.  Follow-up in the emergency department if symptoms become severe, ER precautions discussed in office today..  Prescription for intranasal Flonase, Medrol Dosepak provided.    See AVS Instructions below for written guidance provided to patient on after-visit management and care in addition to our verbal discussion during the visit.    Please note that this dictation was created using voice recognition software. I have attempted to correct all errors, but there may be sound-alike, spelling, grammar and possibly content errors that I did not discover before finalizing the note.    Dereje Reyes PA-C

## 2022-12-15 ENCOUNTER — APPOINTMENT (OUTPATIENT)
Dept: MEDICAL GROUP | Facility: CLINIC | Age: 22
End: 2022-12-15
Payer: MEDICAID

## 2023-07-25 ENCOUNTER — OFFICE VISIT (OUTPATIENT)
Dept: URGENT CARE | Facility: PHYSICIAN GROUP | Age: 23
End: 2023-07-25
Payer: MEDICAID

## 2023-07-25 VITALS
RESPIRATION RATE: 16 BRPM | HEIGHT: 64 IN | OXYGEN SATURATION: 98 % | SYSTOLIC BLOOD PRESSURE: 116 MMHG | TEMPERATURE: 97.8 F | HEART RATE: 77 BPM | WEIGHT: 180 LBS | BODY MASS INDEX: 30.73 KG/M2 | DIASTOLIC BLOOD PRESSURE: 84 MMHG

## 2023-07-25 DIAGNOSIS — J01.90 ACUTE BACTERIAL RHINOSINUSITIS: ICD-10-CM

## 2023-07-25 DIAGNOSIS — B96.89 ACUTE BACTERIAL RHINOSINUSITIS: ICD-10-CM

## 2023-07-25 PROCEDURE — 3079F DIAST BP 80-89 MM HG: CPT | Performed by: STUDENT IN AN ORGANIZED HEALTH CARE EDUCATION/TRAINING PROGRAM

## 2023-07-25 PROCEDURE — 3074F SYST BP LT 130 MM HG: CPT | Performed by: STUDENT IN AN ORGANIZED HEALTH CARE EDUCATION/TRAINING PROGRAM

## 2023-07-25 PROCEDURE — 1126F AMNT PAIN NOTED NONE PRSNT: CPT | Performed by: STUDENT IN AN ORGANIZED HEALTH CARE EDUCATION/TRAINING PROGRAM

## 2023-07-25 PROCEDURE — 99213 OFFICE O/P EST LOW 20 MIN: CPT | Performed by: STUDENT IN AN ORGANIZED HEALTH CARE EDUCATION/TRAINING PROGRAM

## 2023-07-25 RX ORDER — FLUTICASONE PROPIONATE 50 MCG
1 SPRAY, SUSPENSION (ML) NASAL DAILY
Qty: 16 G | Refills: 0 | Status: SHIPPED | OUTPATIENT
Start: 2023-07-25

## 2023-07-25 RX ORDER — AMOXICILLIN AND CLAVULANATE POTASSIUM 875; 125 MG/1; MG/1
1 TABLET, FILM COATED ORAL 2 TIMES DAILY
Qty: 14 TABLET | Refills: 0 | Status: SHIPPED | OUTPATIENT
Start: 2023-07-25 | End: 2023-08-01

## 2023-07-25 RX ORDER — METHYLPREDNISOLONE 4 MG/1
TABLET ORAL
Qty: 21 TABLET | Refills: 0 | Status: SHIPPED | OUTPATIENT
Start: 2023-07-25

## 2023-07-25 ASSESSMENT — ENCOUNTER SYMPTOMS
HOARSE VOICE: 0
SWOLLEN GLANDS: 0
DIAPHORESIS: 0
NECK PAIN: 0
SORE THROAT: 1
PALPITATIONS: 0
CHILLS: 0
COUGH: 0
SINUS PRESSURE: 1
SHORTNESS OF BREATH: 0
FEVER: 0
WHEEZING: 0
HEADACHES: 1

## 2023-07-25 ASSESSMENT — PAIN SCALES - GENERAL: PAINLEVEL: NO PAIN

## 2023-07-25 NOTE — PROGRESS NOTES
"Subjective     Vita Shikha Giordano is a 23 y.o. female who presents with Seasonal Allergies (Sneezing and congestion x 1 month progressing )            Vita is a 23 y.o. female who presents to urgent care with sinus pain/pressure, nasal congestion and sneezing.  Patient states symptoms started over a month ago and gradually worsened since onset.  Patient initially believes symptoms started due to seasonal allergies.  Patient has been taking OTC antihistamine and decongestant with mild temporary relief of symptoms.  No fever/chills.  Patient is having a slight sore throat which she thinks is secondary to her ear pain.    Sinus Problem  This is a new problem. The current episode started more than 1 month ago. The problem has been gradually worsening since onset. There has been no fever. The pain is moderate. Associated symptoms include congestion, ear pain, headaches, sinus pressure and a sore throat. Pertinent negatives include no chills, coughing, diaphoresis, hoarse voice, neck pain, shortness of breath, sneezing or swollen glands. Past treatments include nasal decongestants (antihistamine). The treatment provided mild relief.       Review of Systems   Constitutional:  Negative for chills, diaphoresis and fever.   HENT:  Positive for congestion, ear pain, sinus pressure and sore throat. Negative for hoarse voice and sneezing.    Respiratory:  Negative for cough, shortness of breath and wheezing.    Cardiovascular:  Negative for chest pain and palpitations.   Musculoskeletal:  Negative for neck pain.   Neurological:  Positive for headaches.   Endo/Heme/Allergies:  Positive for environmental allergies.   All other systems reviewed and are negative.             Objective     /84   Pulse 77   Temp 36.6 °C (97.8 °F) (Temporal)   Resp 16   Ht 1.626 m (5' 4\")   Wt 81.6 kg (180 lb)   SpO2 98%   BMI 30.90 kg/m²      Physical Exam  Vitals reviewed.   Constitutional:       General: She is not in acute " distress.     Appearance: Normal appearance. She is not ill-appearing, toxic-appearing or diaphoretic.   HENT:      Head: Normocephalic and atraumatic.      Right Ear: Ear canal and external ear normal. Tympanic membrane is injected.      Left Ear: Ear canal and external ear normal. Tympanic membrane is injected.      Nose: Congestion present.      Mouth/Throat:      Lips: Pink.      Mouth: Mucous membranes are moist.      Pharynx: Oropharynx is clear. Uvula midline.   Eyes:      Extraocular Movements: Extraocular movements intact.      Conjunctiva/sclera: Conjunctivae normal.      Pupils: Pupils are equal, round, and reactive to light.   Neck:      Meningeal: Brudzinski's sign and Kernig's sign absent.   Cardiovascular:      Rate and Rhythm: Normal rate and regular rhythm.   Pulmonary:      Effort: Pulmonary effort is normal.      Breath sounds: Normal breath sounds.   Musculoskeletal:      Cervical back: Normal range of motion. No rigidity.   Lymphadenopathy:      Cervical: No cervical adenopathy.   Skin:     General: Skin is warm and dry.   Neurological:      General: No focal deficit present.      Mental Status: She is alert. Mental status is at baseline.                             Assessment & Plan          1. Acute bacterial rhinosinusitis  - amoxicillin-clavulanate (AUGMENTIN) 875-125 MG Tab; Take 1 Tablet by mouth 2 times a day for 7 days.  Dispense: 14 Tablet; Refill: 0  - methylPREDNISolone (MEDROL DOSEPAK) 4 MG Tablet Therapy Pack; Follow schedule on package instructions.  Dispense: 21 Tablet; Refill: 0  - fluticasone (FLONASE) 50 MCG/ACT nasal spray; Administer 1 Spray into affected nostril(S) every day.  Dispense: 16 g; Refill: 0     Differential diagnoses, supportive care, and indications for immediate follow-up discussed with patient. Pathogenesis of diagnosis discussed including typical length and natural progression.      Instructed to return to urgent care or nearest emergency department if  symptoms fail to improve, for any change in condition, further concerns, or new concerning symptoms.    Patient states understanding and agrees with the plan of care and discharge instructions.

## 2023-11-08 ENCOUNTER — DOCUMENTATION (OUTPATIENT)
Dept: HEALTH INFORMATION MANAGEMENT | Facility: OTHER | Age: 23
End: 2023-11-08
Payer: MEDICAID

## 2023-11-14 ENCOUNTER — OFFICE VISIT (OUTPATIENT)
Dept: URGENT CARE | Facility: PHYSICIAN GROUP | Age: 23
End: 2023-11-14
Payer: MEDICAID

## 2023-11-14 VITALS
RESPIRATION RATE: 18 BRPM | DIASTOLIC BLOOD PRESSURE: 68 MMHG | HEART RATE: 96 BPM | HEIGHT: 64 IN | WEIGHT: 179 LBS | SYSTOLIC BLOOD PRESSURE: 106 MMHG | OXYGEN SATURATION: 98 % | TEMPERATURE: 97 F | BODY MASS INDEX: 30.56 KG/M2

## 2023-11-14 DIAGNOSIS — R68.89 FLU-LIKE SYMPTOMS: ICD-10-CM

## 2023-11-14 LAB
FLUAV RNA SPEC QL NAA+PROBE: NEGATIVE
FLUBV RNA SPEC QL NAA+PROBE: NEGATIVE
RSV RNA SPEC QL NAA+PROBE: NEGATIVE
SARS-COV-2 RNA RESP QL NAA+PROBE: NEGATIVE

## 2023-11-14 PROCEDURE — 3078F DIAST BP <80 MM HG: CPT | Performed by: FAMILY MEDICINE

## 2023-11-14 PROCEDURE — 99213 OFFICE O/P EST LOW 20 MIN: CPT | Performed by: FAMILY MEDICINE

## 2023-11-14 PROCEDURE — 3074F SYST BP LT 130 MM HG: CPT | Performed by: FAMILY MEDICINE

## 2023-11-14 PROCEDURE — 87637 SARSCOV2&INF A&B&RSV AMP PRB: CPT | Mod: QW | Performed by: FAMILY MEDICINE

## 2023-11-14 ASSESSMENT — ENCOUNTER SYMPTOMS
GASTROINTESTINAL NEGATIVE: 1
COUGH: 1
SORE THROAT: 1
CARDIOVASCULAR NEGATIVE: 1
EYES NEGATIVE: 1

## 2023-11-14 NOTE — PROGRESS NOTES
"Subjective:   Vita Giordano is a 23 y.o. female who presents for Coronavirus Screening (Exposure: Headache, body ache, nasal congestion, cough. X 2 days )      HPI    Review of Systems   Constitutional:  Positive for malaise/fatigue.   HENT:  Positive for congestion and sore throat.    Eyes: Negative.    Respiratory:  Positive for cough.    Cardiovascular: Negative.    Gastrointestinal: Negative.    Genitourinary: Negative.        Medications, Allergies, and current problem list reviewed today in Epic.     Objective:     /68   Pulse 96   Temp 36.1 °C (97 °F) (Temporal)   Resp 18   Ht 1.626 m (5' 4\")   Wt 81.2 kg (179 lb)   SpO2 98%     Physical Exam  Vitals and nursing note reviewed.   Constitutional:       Appearance: Normal appearance.   HENT:      Head: Normocephalic and atraumatic.      Right Ear: Tympanic membrane normal.      Left Ear: Tympanic membrane normal.      Nose: Congestion present.      Mouth/Throat:      Pharynx: Oropharynx is clear.   Cardiovascular:      Rate and Rhythm: Normal rate and regular rhythm.      Pulses: Normal pulses.      Heart sounds: Normal heart sounds.   Pulmonary:      Effort: Pulmonary effort is normal.      Breath sounds: Normal breath sounds.   Abdominal:      General: Abdomen is flat. Bowel sounds are normal.      Palpations: Abdomen is soft.   Musculoskeletal:      Cervical back: Normal range of motion and neck supple.   Neurological:      Mental Status: She is alert.         Assessment/Plan:     Diagnosis and associated orders:     1. Flu-like symptoms  POCT CEPHEID COV-2, FLU A/B, RSV - PCR         Comments/MDM:              Differential diagnosis, natural history, supportive care, and indications for immediate follow-up discussed.    Advised the patient to follow-up with the primary care physician for recheck, reevaluation, and consideration of further management.    Please note that this dictation was created using voice recognition software. I have " made a reasonable attempt to correct obvious errors, but I expect that there are errors of grammar and possibly content that I did not discover before finalizing the note.    This note was electronically signed by Jevon Dixon M.D.

## 2023-11-14 NOTE — LETTER
LAKISHA  [facility] [address] [address]     November 14, 2023    Patient: Vita Giordano   YOB: 2000   Date of Visit: 11/14/2023       To Whom It May Concern:    Vita Giordano was seen and treated in our department on 11/14/2023. Please excuse for Covid symptoms.    Sincerely,     Jevon Dixon M.D.

## 2024-06-21 ENCOUNTER — HOSPITAL ENCOUNTER (EMERGENCY)
Facility: MEDICAL CENTER | Age: 24
End: 2024-06-21
Attending: EMERGENCY MEDICINE
Payer: COMMERCIAL

## 2024-06-21 ENCOUNTER — APPOINTMENT (OUTPATIENT)
Dept: RADIOLOGY | Facility: MEDICAL CENTER | Age: 24
End: 2024-06-21
Attending: EMERGENCY MEDICINE
Payer: COMMERCIAL

## 2024-06-21 VITALS
BODY MASS INDEX: 28.87 KG/M2 | RESPIRATION RATE: 16 BRPM | TEMPERATURE: 98.2 F | SYSTOLIC BLOOD PRESSURE: 129 MMHG | OXYGEN SATURATION: 99 % | HEART RATE: 83 BPM | WEIGHT: 169.09 LBS | HEIGHT: 64 IN | DIASTOLIC BLOOD PRESSURE: 71 MMHG

## 2024-06-21 DIAGNOSIS — O21.9 VOMITING PREGNANCY: ICD-10-CM

## 2024-06-21 LAB
ALBUMIN SERPL BCP-MCNC: 4.3 G/DL (ref 3.2–4.9)
ALBUMIN/GLOB SERPL: 1.3 G/DL
ALP SERPL-CCNC: 54 U/L (ref 30–99)
ALT SERPL-CCNC: 15 U/L (ref 2–50)
ANION GAP SERPL CALC-SCNC: 17 MMOL/L (ref 7–16)
APPEARANCE UR: ABNORMAL
APPEARANCE UR: CLEAR
AST SERPL-CCNC: 21 U/L (ref 12–45)
B-HCG SERPL-ACNC: ABNORMAL MIU/ML (ref 0–5)
BACTERIA #/AREA URNS HPF: ABNORMAL /HPF
BASOPHILS # BLD AUTO: 0.3 % (ref 0–1.8)
BASOPHILS # BLD: 0.02 K/UL (ref 0–0.12)
BILIRUB SERPL-MCNC: 0.3 MG/DL (ref 0.1–1.5)
BILIRUB UR QL STRIP.AUTO: NEGATIVE
BILIRUB UR QL STRIP.AUTO: NEGATIVE
BUN SERPL-MCNC: 9 MG/DL (ref 8–22)
CALCIUM ALBUM COR SERPL-MCNC: 9.2 MG/DL (ref 8.5–10.5)
CALCIUM SERPL-MCNC: 9.4 MG/DL (ref 8.5–10.5)
CHLORIDE SERPL-SCNC: 102 MMOL/L (ref 96–112)
CO2 SERPL-SCNC: 16 MMOL/L (ref 20–33)
COLOR UR: YELLOW
COLOR UR: YELLOW
CREAT SERPL-MCNC: 0.46 MG/DL (ref 0.5–1.4)
EOSINOPHIL # BLD AUTO: 0.01 K/UL (ref 0–0.51)
EOSINOPHIL NFR BLD: 0.2 % (ref 0–6.9)
EPI CELLS #/AREA URNS HPF: ABNORMAL /HPF
ERYTHROCYTE [DISTWIDTH] IN BLOOD BY AUTOMATED COUNT: 35.5 FL (ref 35.9–50)
GFR SERPLBLD CREATININE-BSD FMLA CKD-EPI: 135 ML/MIN/1.73 M 2
GLOBULIN SER CALC-MCNC: 3.4 G/DL (ref 1.9–3.5)
GLUCOSE SERPL-MCNC: 87 MG/DL (ref 65–99)
GLUCOSE UR STRIP.AUTO-MCNC: NEGATIVE MG/DL
GLUCOSE UR STRIP.AUTO-MCNC: NEGATIVE MG/DL
HCT VFR BLD AUTO: 44.6 % (ref 37–47)
HGB BLD-MCNC: 15.7 G/DL (ref 12–16)
HYALINE CASTS #/AREA URNS LPF: ABNORMAL /LPF
IMM GRANULOCYTES # BLD AUTO: 0.02 K/UL (ref 0–0.11)
IMM GRANULOCYTES NFR BLD AUTO: 0.3 % (ref 0–0.9)
KETONES UR STRIP.AUTO-MCNC: >=160 MG/DL
KETONES UR STRIP.AUTO-MCNC: >=160 MG/DL
LEUKOCYTE ESTERASE UR QL STRIP.AUTO: NEGATIVE
LEUKOCYTE ESTERASE UR QL STRIP.AUTO: NEGATIVE
LIPASE SERPL-CCNC: 17 U/L (ref 11–82)
LYMPHOCYTES # BLD AUTO: 1.23 K/UL (ref 1–4.8)
LYMPHOCYTES NFR BLD: 18.7 % (ref 22–41)
MCH RBC QN AUTO: 28.6 PG (ref 27–33)
MCHC RBC AUTO-ENTMCNC: 35.2 G/DL (ref 32.2–35.5)
MCV RBC AUTO: 81.2 FL (ref 81.4–97.8)
MICRO URNS: ABNORMAL
MICRO URNS: NORMAL
MONOCYTES # BLD AUTO: 0.28 K/UL (ref 0–0.85)
MONOCYTES NFR BLD AUTO: 4.2 % (ref 0–13.4)
NEUTROPHILS # BLD AUTO: 5.03 K/UL (ref 1.82–7.42)
NEUTROPHILS NFR BLD: 76.3 % (ref 44–72)
NITRITE UR QL STRIP.AUTO: NEGATIVE
NITRITE UR QL STRIP.AUTO: NEGATIVE
NRBC # BLD AUTO: 0 K/UL
NRBC BLD-RTO: 0 /100 WBC (ref 0–0.2)
NUMBER OF RH DOSES IND 8505RD: NORMAL
PH UR STRIP.AUTO: 5.5 [PH] (ref 5–8)
PH UR STRIP.AUTO: 5.5 [PH] (ref 5–8)
PLATELET # BLD AUTO: 290 K/UL (ref 164–446)
PMV BLD AUTO: 9.5 FL (ref 9–12.9)
POTASSIUM SERPL-SCNC: 4.1 MMOL/L (ref 3.6–5.5)
PROT SERPL-MCNC: 7.7 G/DL (ref 6–8.2)
PROT UR QL STRIP: 30 MG/DL
PROT UR QL STRIP: NEGATIVE MG/DL
RBC # BLD AUTO: 5.49 M/UL (ref 4.2–5.4)
RBC # URNS HPF: ABNORMAL /HPF
RBC UR QL AUTO: NEGATIVE
RBC UR QL AUTO: NEGATIVE
RH BLD: NORMAL
SODIUM SERPL-SCNC: 135 MMOL/L (ref 135–145)
SP GR UR STRIP.AUTO: 1.03
SP GR UR STRIP.AUTO: 1.03
UROBILINOGEN UR STRIP.AUTO-MCNC: 0.2 MG/DL
UROBILINOGEN UR STRIP.AUTO-MCNC: 0.2 MG/DL
WBC # BLD AUTO: 6.6 K/UL (ref 4.8–10.8)
WBC #/AREA URNS HPF: ABNORMAL /HPF

## 2024-06-21 PROCEDURE — 99285 EMERGENCY DEPT VISIT HI MDM: CPT

## 2024-06-21 PROCEDURE — 85025 COMPLETE CBC W/AUTO DIFF WBC: CPT

## 2024-06-21 PROCEDURE — 84702 CHORIONIC GONADOTROPIN TEST: CPT

## 2024-06-21 PROCEDURE — 36415 COLL VENOUS BLD VENIPUNCTURE: CPT

## 2024-06-21 PROCEDURE — 700111 HCHG RX REV CODE 636 W/ 250 OVERRIDE (IP): Mod: JZ,UD | Performed by: EMERGENCY MEDICINE

## 2024-06-21 PROCEDURE — A9270 NON-COVERED ITEM OR SERVICE: HCPCS | Mod: UD | Performed by: EMERGENCY MEDICINE

## 2024-06-21 PROCEDURE — 80053 COMPREHEN METABOLIC PANEL: CPT

## 2024-06-21 PROCEDURE — 86901 BLOOD TYPING SEROLOGIC RH(D): CPT

## 2024-06-21 PROCEDURE — 81001 URINALYSIS AUTO W/SCOPE: CPT

## 2024-06-21 PROCEDURE — 700102 HCHG RX REV CODE 250 W/ 637 OVERRIDE(OP): Mod: UD | Performed by: EMERGENCY MEDICINE

## 2024-06-21 PROCEDURE — 76817 TRANSVAGINAL US OBSTETRIC: CPT

## 2024-06-21 PROCEDURE — 83690 ASSAY OF LIPASE: CPT

## 2024-06-21 PROCEDURE — 81003 URINALYSIS AUTO W/O SCOPE: CPT

## 2024-06-21 PROCEDURE — 96374 THER/PROPH/DIAG INJ IV PUSH: CPT

## 2024-06-21 PROCEDURE — 700105 HCHG RX REV CODE 258: Mod: UD | Performed by: EMERGENCY MEDICINE

## 2024-06-21 RX ORDER — ONDANSETRON 2 MG/ML
4 INJECTION INTRAMUSCULAR; INTRAVENOUS ONCE
Status: COMPLETED | OUTPATIENT
Start: 2024-06-21 | End: 2024-06-21

## 2024-06-21 RX ORDER — SODIUM CHLORIDE 9 MG/ML
1000 INJECTION, SOLUTION INTRAVENOUS ONCE
Status: COMPLETED | OUTPATIENT
Start: 2024-06-21 | End: 2024-06-21

## 2024-06-21 RX ORDER — CEPHALEXIN 500 MG/1
500 CAPSULE ORAL ONCE
Status: COMPLETED | OUTPATIENT
Start: 2024-06-21 | End: 2024-06-21

## 2024-06-21 RX ORDER — ONDANSETRON 4 MG/1
4 TABLET, ORALLY DISINTEGRATING ORAL EVERY 6 HOURS PRN
Qty: 10 TABLET | Refills: 0 | Status: ON HOLD | OUTPATIENT
Start: 2024-06-21 | End: 2024-06-27

## 2024-06-21 RX ADMIN — ONDANSETRON 4 MG: 2 INJECTION INTRAMUSCULAR; INTRAVENOUS at 13:31

## 2024-06-21 RX ADMIN — SODIUM CHLORIDE 1000 ML: 9 INJECTION, SOLUTION INTRAVENOUS at 13:34

## 2024-06-21 RX ADMIN — SODIUM CHLORIDE 1000 ML: 9 INJECTION, SOLUTION INTRAVENOUS at 15:12

## 2024-06-21 RX ADMIN — CEPHALEXIN 500 MG: 500 CAPSULE ORAL at 16:17

## 2024-06-21 NOTE — ED TRIAGE NOTES
"Chief Complaint   Patient presents with    Nausea     Patient reports that she is a few weeks pregnant and also got COVID the beginning of the week and now is having persistent vomiting and is unable to keep food down       Patient to triage ambulatory with a steady gait, AAOx4, Appropriate precautions in place.     Explained wait time and triage process. Placed back in lobby. Told to notify ED tech or RN of any changes, verbalized understanding.    /87   Pulse (!) 104   Temp 36.9 °C (98.4 °F) (Temporal)   Resp 16   Ht 1.626 m (5' 4\")   Wt 76.7 kg (169 lb 1.5 oz)   LMP 04/16/2024 (Exact Date)   SpO2 97%   BMI 29.02 kg/m²     "

## 2024-06-21 NOTE — ED NOTES
Patient ambulated to restroom independently without incident. Ordered labs collected and sent. 2nd 1L NS bolus started per MAR.

## 2024-06-21 NOTE — ED PROVIDER NOTES
ED Provider Note    CHIEF COMPLAINT  Chief Complaint   Patient presents with    Nausea     Patient reports that she is a few weeks pregnant and also got COVID the beginning of the week and now is having persistent vomiting and is unable to keep food down       EXTERNAL RECORDS REVIEWED  Other none germane to today's visit    HPI/ROS  LIMITATION TO HISTORY   Select: : None  OUTSIDE HISTORIAN(S):  None    Vita Giordano is a 24 y.o. G1, P0 female at approximately 8.5 weeks gestation based on last menstrual period 4/16/2024 who presents with a chief complaint of nausea and vomiting that has been ongoing for the past several days.  She states that she got COVID at the beginning of the week but those symptoms have since resolved.  She has no abdominal pain or vaginal bleeding.  She has no loss of fluid from the vagina.  She denies any fevers or chills.  She has no dysuria or diarrhea.  She did see some very minimal streaks of blood in her emesis but denies any melena or hematochezia.  She has tried vitamin B6 and Unisom without any improvement.  She cannot keep anything down whatsoever.    PAST MEDICAL HISTORY   has a past medical history of Acute nasopharyngitis (11/2021), Allergy, Anxiety, Asthma (02/25/2022), COVID-19 (10/02/2021), COVID-19 (12/31/2021), Depression (02/25/2022), History of depression (4/29/2019), and IBD (inflammatory bowel disease).    SURGICAL HISTORY   has a past surgical history that includes tonsillectomy; other; knee arthroscopy (Left); dental extraction(s) (Bilateral, 2019); and knee arthroscopy, open medial patellofemoral ligament reconstruction (Right, 3/8/2022).    FAMILY HISTORY  Family History   Problem Relation Age of Onset    Depression Mother     Other Mother         hydrocephalus arnold-Chiari malformation    Psychiatric Illness Mother         anxiety and depression    Seizures Mother         epilepsy    Diabetes Mother     Hypertension Mother     Migraines Mother     Depression  "Father     Allergies Father     Psychiatric Illness Father         depression and bipolar    Other Father         GERD    Diabetes Maternal Grandfather     Heart Disease Maternal Grandfather     Hypertension Maternal Grandfather     Hyperlipidemia Maternal Grandfather     Other Sister         IBS    Psychiatric Illness Sister         anxiety and depression    Migraines Sister     Cancer Maternal Aunt         pancreatic    No Known Problems Maternal Grandmother     No Known Problems Paternal Grandmother     Heart Disease Paternal Grandfather     No Known Problems Sister        SOCIAL HISTORY  Social History     Tobacco Use    Smoking status: Former     Current packs/day: 0.00     Types: Cigarettes     Quit date: 2017     Years since quittin.4    Smokeless tobacco: Never   Vaping Use    Vaping status: Never Used   Substance and Sexual Activity    Alcohol use: Not Currently     Comment: rarely - 1-2 x a year    Drug use: Not Currently     Frequency: 2.0 times per week     Types: Marijuana, Inhaled, Oral     Comment: Occasional - marijuana    Sexual activity: Yes     Partners: Male     Birth control/protection: Implant       CURRENT MEDICATIONS  Home Medications       Reviewed by Lashaun Meléndez R.N. (Registered Nurse) on 24 at 1256  Med List Status: Partial     Medication Last Dose Status   fluticasone (FLONASE) 50 MCG/ACT nasal spray  Active   methylPREDNISolone (MEDROL DOSEPAK) 4 MG Tablet Therapy Pack  Active                    ALLERGIES  Allergies   Allergen Reactions    Norco [Hydrocodone-Acetaminophen] Vomiting       PHYSICAL EXAM  VITAL SIGNS: /87   Pulse (!) 104   Temp 36.9 °C (98.4 °F) (Temporal)   Resp 16   Ht 1.626 m (5' 4\")   Wt 76.7 kg (169 lb 1.5 oz)   LMP 2024 (Exact Date)   SpO2 97%   BMI 29.02 kg/m²    Physical Exam  Vitals and nursing note reviewed.   Constitutional:       Appearance: She is ill-appearing.   HENT:      Head: Normocephalic and atraumatic.      Right " Ear: External ear normal.      Left Ear: External ear normal.      Nose: Nose normal.      Mouth/Throat:      Mouth: Mucous membranes are dry.   Eyes:      Extraocular Movements: Extraocular movements intact.      Conjunctiva/sclera: Conjunctivae normal.      Pupils: Pupils are equal, round, and reactive to light.   Cardiovascular:      Rate and Rhythm: Regular rhythm. Tachycardia present.      Pulses: Normal pulses.   Pulmonary:      Effort: Pulmonary effort is normal.      Breath sounds: Normal breath sounds.   Abdominal:      Palpations: Abdomen is soft.      Tenderness: There is no abdominal tenderness.   Musculoskeletal:         General: Normal range of motion.      Cervical back: Normal range of motion and neck supple.   Skin:     General: Skin is warm and dry.   Neurological:      General: No focal deficit present.      Mental Status: She is alert and oriented to person, place, and time.   Psychiatric:         Mood and Affect: Mood normal.         Behavior: Behavior normal.       EKG/LABS  Results for orders placed or performed during the hospital encounter of 06/21/24   CBC WITH DIFFERENTIAL   Result Value Ref Range    WBC 6.6 4.8 - 10.8 K/uL    RBC 5.49 (H) 4.20 - 5.40 M/uL    Hemoglobin 15.7 12.0 - 16.0 g/dL    Hematocrit 44.6 37.0 - 47.0 %    MCV 81.2 (L) 81.4 - 97.8 fL    MCH 28.6 27.0 - 33.0 pg    MCHC 35.2 32.2 - 35.5 g/dL    RDW 35.5 (L) 35.9 - 50.0 fL    Platelet Count 290 164 - 446 K/uL    MPV 9.5 9.0 - 12.9 fL    Neutrophils-Polys 76.30 (H) 44.00 - 72.00 %    Lymphocytes 18.70 (L) 22.00 - 41.00 %    Monocytes 4.20 0.00 - 13.40 %    Eosinophils 0.20 0.00 - 6.90 %    Basophils 0.30 0.00 - 1.80 %    Immature Granulocytes 0.30 0.00 - 0.90 %    Nucleated RBC 0.00 0.00 - 0.20 /100 WBC    Neutrophils (Absolute) 5.03 1.82 - 7.42 K/uL    Lymphs (Absolute) 1.23 1.00 - 4.80 K/uL    Monos (Absolute) 0.28 0.00 - 0.85 K/uL    Eos (Absolute) 0.01 0.00 - 0.51 K/uL    Baso (Absolute) 0.02 0.00 - 0.12 K/uL     Immature Granulocytes (abs) 0.02 0.00 - 0.11 K/uL    NRBC (Absolute) 0.00 K/uL   COMP METABOLIC PANEL   Result Value Ref Range    Sodium 135 135 - 145 mmol/L    Potassium 4.1 3.6 - 5.5 mmol/L    Chloride 102 96 - 112 mmol/L    Co2 16 (L) 20 - 33 mmol/L    Anion Gap 17.0 (H) 7.0 - 16.0    Glucose 87 65 - 99 mg/dL    Bun 9 8 - 22 mg/dL    Creatinine 0.46 (L) 0.50 - 1.40 mg/dL    Calcium 9.4 8.5 - 10.5 mg/dL    Correct Calcium 9.2 8.5 - 10.5 mg/dL    AST(SGOT) 21 12 - 45 U/L    ALT(SGPT) 15 2 - 50 U/L    Alkaline Phosphatase 54 30 - 99 U/L    Total Bilirubin 0.3 0.1 - 1.5 mg/dL    Albumin 4.3 3.2 - 4.9 g/dL    Total Protein 7.7 6.0 - 8.2 g/dL    Globulin 3.4 1.9 - 3.5 g/dL    A-G Ratio 1.3 g/dL   LIPASE   Result Value Ref Range    Lipase 17 11 - 82 U/L   HCG QUANTITATIVE   Result Value Ref Range    Bhcg 502779.0 (H) 0.0 - 5.0 mIU/mL   URINALYSIS,CULTURE IF INDICATED    Specimen: Urine, Clean Catch   Result Value Ref Range    Color Yellow     Character Cloudy (A)     Specific Gravity 1.033 <1.035    Ph 5.5 5.0 - 8.0    Glucose Negative Negative mg/dL    Ketones >=160 Negative mg/dL    Protein 30 (A) Negative mg/dL    Bilirubin Negative Negative    Urobilinogen, Urine 0.2 Negative    Nitrite Negative Negative    Leukocyte Esterase Negative Negative    Occult Blood Negative Negative    Micro Urine Req Microscopic    RH Type for Rhogam from E.D.   Result Value Ref Range    Emergency Department Rh Typing POS     Number Of Rh Doses Indicated ZERO    ESTIMATED GFR   Result Value Ref Range    GFR (CKD-EPI) 135 >60 mL/min/1.73 m 2   URINE MICROSCOPIC (W/UA)   Result Value Ref Range    WBC 2-5 /hpf    RBC 0-2 /hpf    Bacteria Moderate (A) None /hpf    Epithelial Cells Many (A) /hpf    Hyaline Cast 0-2 /lpf   URINALYSIS,CULTURE IF INDICATED    Specimen: Urine, Clean Catch   Result Value Ref Range    Color Yellow     Character Clear     Specific Gravity 1.026 <1.035    Ph 5.5 5.0 - 8.0    Glucose Negative Negative mg/dL    Ketones  >=160 Negative mg/dL    Protein Negative Negative mg/dL    Bilirubin Negative Negative    Urobilinogen, Urine 0.2 Negative    Nitrite Negative Negative    Leukocyte Esterase Negative Negative    Occult Blood Negative Negative    Micro Urine Req see below      I have independently interpreted this EKG    RADIOLOGY/PROCEDURES   I have independently interpreted the diagnostic imaging associated with this visit and am waiting the final reading from the radiologist.   My preliminary interpretation is as follows: N/A    Radiologist interpretation:  US-OB 1ST TRIMESTER WITH TRANSVAGINAL (COMBO)   Final Result      1.  Single living intrauterine pregnancy at 8 weeks, 5 days estimated gestational age and an JARROD of 1/26/2025.   2.  Small perigestational fluid collection.   3.  Fetal heart rate is 169 bpm.        COURSE & MEDICAL DECISION MAKING    ASSESSMENT, COURSE AND PLAN  Care Narrative: This is a 24-year-old G1, P0 female at approximately 8.5 weeks gestation based on last menstrual period who is here with persistent nausea and vomiting despite trying to use Unisom and vitamin B6 at home.  She has no abdominal pain and her abdominal exam is reassuring without any tenderness.  She is not febrile but she is tachycardic.  She appears quite dehydrated and has nonbilious emesis in a bag by her side.    Patient was started on IV fluids and given a dose of Zofran.    CBC without leukocytosis.  Metabolic panel with mild anion gap elevation of 17 and CO2 of 16.  She has normal renal and liver function.  Lipase is normal.  Initial urinalysis contaminated with many epithelial cells and moderate bacteria but no leukocyte esterase or nitrites.  She was initially given a dose of Keflex and a repeat urinalysis was collected which was reassuring.    Ultrasound reveals a single living IUP at 8 weeks and 5 days with a small perigestational fluid collection.  She is Rh+ but does not have any vaginal bleeding or loss of fluid per vagina so  RhoGAM is not indicated.    Patient was reevaluated at bedside.  She is resting comfortably.  She is tolerating p.o. without difficulty.  She feels significantly improved.  We went over the results of her labs and imaging.  I will give her a small prescription for Zofran as the B6/Unisom is not working.  I have asked her to call her OB/GYN as she will likely need to be seen earlier in clinic.  We went over strict return precautions.  She is discharged in good and stable condition.    Hydration: Based on the patient's presentation of Dehydration the patient was given IV fluids. IV Hydration was used because oral hydration was not adequate alone. Upon recheck following hydration, the patient was improved.    ADDITIONAL PROBLEMS MANAGED  None    DISPOSITION AND DISCUSSIONS  I have discussed management of the patient with the following physicians and FIDEL's: None    Discussion of management with other QHP or appropriate source(s): None     Escalation of care considered, and ultimately not performed:acute inpatient care management, however at this time, the patient is most appropriate for outpatient management    Barriers to care at this time, including but not limited to:  None .     Decision tools and prescription drugs considered including, but not limited to:  Zofran .    FINAL DIAGNOSIS  1. Vomiting pregnancy      Electronically signed by: Darion Pérez M.D., 6/21/2024 1:13 PM

## 2024-06-21 NOTE — DISCHARGE INSTRUCTIONS
You were seen in the ER for nausea and vomiting in pregnancy.  Thankfully your labs and imaging are reassuring.  You appear to be about 8 weeks and 5 days pregnant with an estimated due date of January 26, 2025.  I gave you a dose of nausea medication and IV fluids through your IV.  You are safe for discharge.  I have given you a small prescription for nausea medication, please take it as directed.  Please contact your OB/GYN tomorrow to discuss your ED visit as you may need to be seen earlier.  Return with new or worsening symptoms.  I hope you feel better soon!

## 2024-06-21 NOTE — ED NOTES
Second UA ordered by ERP. Clean catch explained to patient by ERP. Ordered labs collected and sent.

## 2024-06-22 NOTE — ED NOTES
Patient revitaled. PO challenge successful.     Discharge orders received. Discharge instructions provided by ERP. AVS provided and reviewed with patient. Patient AOx4 and ambulatory. IV removed prior to discharge. Patient discharged to family without incident.

## 2024-06-23 ENCOUNTER — HOSPITAL ENCOUNTER (INPATIENT)
Facility: MEDICAL CENTER | Age: 24
LOS: 1 days | End: 2024-06-27
Attending: EMERGENCY MEDICINE | Admitting: HOSPITALIST
Payer: COMMERCIAL

## 2024-06-23 DIAGNOSIS — O21.9 VOMITING PREGNANCY: ICD-10-CM

## 2024-06-23 DIAGNOSIS — O21.0 HYPEREMESIS GRAVIDARUM: ICD-10-CM

## 2024-06-23 PROBLEM — E87.29 HIGH ANION GAP METABOLIC ACIDOSIS: Status: ACTIVE | Noted: 2024-06-23

## 2024-06-23 PROBLEM — Z3A.08 8 WEEKS GESTATION OF PREGNANCY: Status: ACTIVE | Noted: 2024-06-23

## 2024-06-23 LAB
ALBUMIN SERPL BCP-MCNC: 4.6 G/DL (ref 3.2–4.9)
ALBUMIN/GLOB SERPL: 1.4 G/DL
ALP SERPL-CCNC: 54 U/L (ref 30–99)
ALT SERPL-CCNC: 20 U/L (ref 2–50)
ANION GAP SERPL CALC-SCNC: 17 MMOL/L (ref 7–16)
AST SERPL-CCNC: 20 U/L (ref 12–45)
BILIRUB SERPL-MCNC: 0.5 MG/DL (ref 0.1–1.5)
BUN SERPL-MCNC: 6 MG/DL (ref 8–22)
CALCIUM ALBUM COR SERPL-MCNC: 9.3 MG/DL (ref 8.5–10.5)
CALCIUM SERPL-MCNC: 9.8 MG/DL (ref 8.5–10.5)
CHLORIDE SERPL-SCNC: 106 MMOL/L (ref 96–112)
CO2 SERPL-SCNC: 14 MMOL/L (ref 20–33)
CREAT SERPL-MCNC: 0.43 MG/DL (ref 0.5–1.4)
EKG IMPRESSION: NORMAL
GFR SERPLBLD CREATININE-BSD FMLA CKD-EPI: 137 ML/MIN/1.73 M 2
GLOBULIN SER CALC-MCNC: 3.3 G/DL (ref 1.9–3.5)
GLUCOSE SERPL-MCNC: 97 MG/DL (ref 65–99)
POTASSIUM SERPL-SCNC: 3.9 MMOL/L (ref 3.6–5.5)
PROT SERPL-MCNC: 7.9 G/DL (ref 6–8.2)
SODIUM SERPL-SCNC: 137 MMOL/L (ref 135–145)

## 2024-06-23 PROCEDURE — 93005 ELECTROCARDIOGRAM TRACING: CPT | Performed by: INTERNAL MEDICINE

## 2024-06-23 PROCEDURE — 99285 EMERGENCY DEPT VISIT HI MDM: CPT

## 2024-06-23 PROCEDURE — 96375 TX/PRO/DX INJ NEW DRUG ADDON: CPT

## 2024-06-23 PROCEDURE — 700111 HCHG RX REV CODE 636 W/ 250 OVERRIDE (IP): Mod: UD | Performed by: EMERGENCY MEDICINE

## 2024-06-23 PROCEDURE — 36415 COLL VENOUS BLD VENIPUNCTURE: CPT

## 2024-06-23 PROCEDURE — 96376 TX/PRO/DX INJ SAME DRUG ADON: CPT

## 2024-06-23 PROCEDURE — 93010 ELECTROCARDIOGRAM REPORT: CPT | Performed by: INTERNAL MEDICINE

## 2024-06-23 PROCEDURE — 700105 HCHG RX REV CODE 258

## 2024-06-23 PROCEDURE — 80053 COMPREHEN METABOLIC PANEL: CPT

## 2024-06-23 PROCEDURE — 700111 HCHG RX REV CODE 636 W/ 250 OVERRIDE (IP): Mod: JZ

## 2024-06-23 PROCEDURE — 700105 HCHG RX REV CODE 258: Mod: UD | Performed by: EMERGENCY MEDICINE

## 2024-06-23 PROCEDURE — 700102 HCHG RX REV CODE 250 W/ 637 OVERRIDE(OP)

## 2024-06-23 PROCEDURE — 96374 THER/PROPH/DIAG INJ IV PUSH: CPT

## 2024-06-23 PROCEDURE — 700111 HCHG RX REV CODE 636 W/ 250 OVERRIDE (IP): Mod: JZ | Performed by: INTERNAL MEDICINE

## 2024-06-23 PROCEDURE — G0378 HOSPITAL OBSERVATION PER HR: HCPCS

## 2024-06-23 PROCEDURE — A9270 NON-COVERED ITEM OR SERVICE: HCPCS

## 2024-06-23 PROCEDURE — 99223 1ST HOSP IP/OBS HIGH 75: CPT | Performed by: INTERNAL MEDICINE

## 2024-06-23 RX ORDER — METOCLOPRAMIDE HYDROCHLORIDE 5 MG/ML
10 INJECTION INTRAMUSCULAR; INTRAVENOUS ONCE
Status: COMPLETED | OUTPATIENT
Start: 2024-06-23 | End: 2024-06-23

## 2024-06-23 RX ORDER — PROMETHAZINE HYDROCHLORIDE 25 MG/1
12.5-25 TABLET ORAL EVERY 4 HOURS PRN
Status: DISCONTINUED | OUTPATIENT
Start: 2024-06-23 | End: 2024-06-23

## 2024-06-23 RX ORDER — PROMETHAZINE HYDROCHLORIDE 12.5 MG/1
12.5-25 SUPPOSITORY RECTAL EVERY 4 HOURS PRN
Status: DISCONTINUED | OUTPATIENT
Start: 2024-06-23 | End: 2024-06-23

## 2024-06-23 RX ORDER — PYRIDOXINE HCL (VITAMIN B6) 25 MG
25 TABLET ORAL DAILY
COMMUNITY

## 2024-06-23 RX ORDER — ONDANSETRON 2 MG/ML
4 INJECTION INTRAMUSCULAR; INTRAVENOUS EVERY 4 HOURS PRN
Status: DISCONTINUED | OUTPATIENT
Start: 2024-06-23 | End: 2024-06-27 | Stop reason: HOSPADM

## 2024-06-23 RX ORDER — LORAZEPAM 2 MG/ML
0.5 INJECTION INTRAMUSCULAR EVERY 4 HOURS PRN
Status: COMPLETED | OUTPATIENT
Start: 2024-06-23 | End: 2024-06-23

## 2024-06-23 RX ORDER — PROCHLORPERAZINE EDISYLATE 5 MG/ML
5-10 INJECTION INTRAMUSCULAR; INTRAVENOUS EVERY 4 HOURS PRN
Status: DISCONTINUED | OUTPATIENT
Start: 2024-06-23 | End: 2024-06-23

## 2024-06-23 RX ORDER — PYRIDOXINE HCL (VITAMIN B6) 25 MG
25 TABLET ORAL DAILY
Status: DISCONTINUED | OUTPATIENT
Start: 2024-06-23 | End: 2024-06-27 | Stop reason: HOSPADM

## 2024-06-23 RX ORDER — SCOLOPAMINE TRANSDERMAL SYSTEM 1 MG/1
1 PATCH, EXTENDED RELEASE TRANSDERMAL
Status: DISCONTINUED | OUTPATIENT
Start: 2024-06-23 | End: 2024-06-27 | Stop reason: HOSPADM

## 2024-06-23 RX ORDER — VITAMIN A ACETATE, BETA CAROTENE, ASCORBIC ACID, CHOLECALCIFEROL, .ALPHA.-TOCOPHEROL ACETATE, DL-, THIAMINE MONONITRATE, RIBOFLAVIN, NIACINAMIDE, PYRIDOXINE HYDROCHLORIDE, FOLIC ACID, CYANOCOBALAMIN, CALCIUM CARBONATE, FERROUS FUMARATE, ZINC OXIDE, CUPRIC OXIDE 3080; 12; 120; 400; 1; 1.84; 3; 20; 22; 920; 25; 200; 27; 10; 2 [IU]/1; UG/1; MG/1; [IU]/1; MG/1; MG/1; MG/1; MG/1; MG/1; [IU]/1; MG/1; MG/1; MG/1; MG/1; MG/1
1 TABLET, FILM COATED ORAL
Status: DISCONTINUED | OUTPATIENT
Start: 2024-06-23 | End: 2024-06-27 | Stop reason: HOSPADM

## 2024-06-23 RX ORDER — SODIUM CHLORIDE 9 MG/ML
INJECTION, SOLUTION INTRAVENOUS CONTINUOUS
Status: DISCONTINUED | OUTPATIENT
Start: 2024-06-23 | End: 2024-06-25

## 2024-06-23 RX ORDER — METOCLOPRAMIDE HYDROCHLORIDE 5 MG/ML
10 INJECTION INTRAMUSCULAR; INTRAVENOUS EVERY 6 HOURS PRN
Status: DISCONTINUED | OUTPATIENT
Start: 2024-06-23 | End: 2024-06-27 | Stop reason: HOSPADM

## 2024-06-23 RX ORDER — ONDANSETRON 4 MG/1
4 TABLET, ORALLY DISINTEGRATING ORAL EVERY 4 HOURS PRN
Status: DISCONTINUED | OUTPATIENT
Start: 2024-06-23 | End: 2024-06-27 | Stop reason: HOSPADM

## 2024-06-23 RX ORDER — SODIUM CHLORIDE 9 MG/ML
1000 INJECTION, SOLUTION INTRAVENOUS ONCE
Status: COMPLETED | OUTPATIENT
Start: 2024-06-23 | End: 2024-06-23

## 2024-06-23 RX ORDER — ACETAMINOPHEN 325 MG/1
650 TABLET ORAL EVERY 6 HOURS PRN
Status: DISCONTINUED | OUTPATIENT
Start: 2024-06-23 | End: 2024-06-27 | Stop reason: HOSPADM

## 2024-06-23 RX ADMIN — SODIUM CHLORIDE 1000 ML: 9 INJECTION, SOLUTION INTRAVENOUS at 12:30

## 2024-06-23 RX ADMIN — METOCLOPRAMIDE 10 MG: 5 INJECTION, SOLUTION INTRAMUSCULAR; INTRAVENOUS at 14:15

## 2024-06-23 RX ADMIN — SODIUM CHLORIDE 1000 ML: 9 INJECTION, SOLUTION INTRAVENOUS at 13:39

## 2024-06-23 RX ADMIN — LORAZEPAM 0.5 MG: 2 INJECTION INTRAMUSCULAR; INTRAVENOUS at 18:12

## 2024-06-23 RX ADMIN — SODIUM CHLORIDE: 9 INJECTION, SOLUTION INTRAVENOUS at 15:59

## 2024-06-23 RX ADMIN — PYRIDOXINE HYDROCHLORIDE 20 MG: 100 INJECTION, SOLUTION INTRAMUSCULAR; INTRAVENOUS at 13:15

## 2024-06-23 RX ADMIN — SCOPOLAMINE 1 PATCH: 1.5 PATCH, EXTENDED RELEASE TRANSDERMAL at 18:59

## 2024-06-23 RX ADMIN — METOCLOPRAMIDE 10 MG: 5 INJECTION, SOLUTION INTRAMUSCULAR; INTRAVENOUS at 16:04

## 2024-06-23 RX ADMIN — ONDANSETRON 4 MG: 2 INJECTION INTRAMUSCULAR; INTRAVENOUS at 19:10

## 2024-06-23 SDOH — ECONOMIC STABILITY: TRANSPORTATION INSECURITY
IN THE PAST 12 MONTHS, HAS LACK OF RELIABLE TRANSPORTATION KEPT YOU FROM MEDICAL APPOINTMENTS, MEETINGS, WORK OR FROM GETTING THINGS NEEDED FOR DAILY LIVING?: NO

## 2024-06-23 SDOH — ECONOMIC STABILITY: TRANSPORTATION INSECURITY
IN THE PAST 12 MONTHS, HAS THE LACK OF TRANSPORTATION KEPT YOU FROM MEDICAL APPOINTMENTS OR FROM GETTING MEDICATIONS?: NO

## 2024-06-23 ASSESSMENT — LIFESTYLE VARIABLES
TOTAL SCORE: 0
DOES PATIENT WANT TO STOP DRINKING: NO
EVER HAD A DRINK FIRST THING IN THE MORNING TO STEADY YOUR NERVES TO GET RID OF A HANGOVER: NO
EVER FELT BAD OR GUILTY ABOUT YOUR DRINKING: NO
HAVE YOU EVER FELT YOU SHOULD CUT DOWN ON YOUR DRINKING: NO
TOTAL SCORE: 0
HOW MANY TIMES IN THE PAST YEAR HAVE YOU HAD 5 OR MORE DRINKS IN A DAY: 0
ON A TYPICAL DAY WHEN YOU DRINK ALCOHOL HOW MANY DRINKS DO YOU HAVE: 0
TOTAL SCORE: 0
CONSUMPTION TOTAL: NEGATIVE
HAVE PEOPLE ANNOYED YOU BY CRITICIZING YOUR DRINKING: NO
AVERAGE NUMBER OF DAYS PER WEEK YOU HAVE A DRINK CONTAINING ALCOHOL: 0
ALCOHOL_USE: NO

## 2024-06-23 ASSESSMENT — SOCIAL DETERMINANTS OF HEALTH (SDOH)
WITHIN THE LAST YEAR, HAVE YOU BEEN AFRAID OF YOUR PARTNER OR EX-PARTNER?: NO
WITHIN THE PAST 12 MONTHS, YOU WORRIED THAT YOUR FOOD WOULD RUN OUT BEFORE YOU GOT THE MONEY TO BUY MORE: SOMETIMES TRUE
WITHIN THE PAST 12 MONTHS, THE FOOD YOU BOUGHT JUST DIDN'T LAST AND YOU DIDN'T HAVE MONEY TO GET MORE: SOMETIMES TRUE
IN THE PAST 12 MONTHS, HAS THE ELECTRIC, GAS, OIL, OR WATER COMPANY THREATENED TO SHUT OFF SERVICE IN YOUR HOME?: NO
WITHIN THE LAST YEAR, HAVE YOU BEEN KICKED, HIT, SLAPPED, OR OTHERWISE PHYSICALLY HURT BY YOUR PARTNER OR EX-PARTNER?: NO
WITHIN THE LAST YEAR, HAVE TO BEEN RAPED OR FORCED TO HAVE ANY KIND OF SEXUAL ACTIVITY BY YOUR PARTNER OR EX-PARTNER?: NO
WITHIN THE LAST YEAR, HAVE YOU BEEN HUMILIATED OR EMOTIONALLY ABUSED IN OTHER WAYS BY YOUR PARTNER OR EX-PARTNER?: NO

## 2024-06-23 ASSESSMENT — PATIENT HEALTH QUESTIONNAIRE - PHQ9
2. FEELING DOWN, DEPRESSED, IRRITABLE, OR HOPELESS: NOT AT ALL
1. LITTLE INTEREST OR PLEASURE IN DOING THINGS: NOT AT ALL
SUM OF ALL RESPONSES TO PHQ9 QUESTIONS 1 AND 2: 0

## 2024-06-23 ASSESSMENT — ENCOUNTER SYMPTOMS
NAUSEA: 1
VOMITING: 1

## 2024-06-23 ASSESSMENT — PAIN DESCRIPTION - PAIN TYPE
TYPE: ACUTE PAIN
TYPE: ACUTE PAIN

## 2024-06-23 ASSESSMENT — FIBROSIS 4 INDEX
FIB4 SCORE: 0.4
FIB4 SCORE: 0.49

## 2024-06-23 NOTE — ED NOTES
Med rec updated and complete. Allergies reviewed. Confirmed name and date of birth.  Pt denies antibiotic use in last 30 days.      Home pharmacy  [facility] = [phone]

## 2024-06-23 NOTE — PROGRESS NOTES
Report received. Assumed care. Pt AAOx4. Focused Assessment complete. VSS. Denies pain,Actively vomiting, medicated per MAR No other complaints reported; Pt was update for the care for the day. White board updated, All question answered. Pt has call light within reach, bed is in the lowest position. Pt has no other needs at this time.

## 2024-06-23 NOTE — ASSESSMENT & PLAN NOTE
IV fluid hydration: DC NS and start D5.45NS  Monitor BMP daily    6/26  Still acidotic but improved  Trial off of IVFs

## 2024-06-23 NOTE — ED TRIAGE NOTES
Chief Complaint   Patient presents with    Pregnancy     9 weeks pregnant    N/V     Last night approx 5pm  'I've probably thrown up every half hour since'   Endorses taking prescribed zofran without relief.

## 2024-06-23 NOTE — PROGRESS NOTES
2 RN Skin Assessment Completed      Head: WDL  Ears: WDL  Nose: WDL  Mouth: WDL  Neck: WDL  Breasts/Chest: WDL  Shoulder Blades: WDL  Spine: WDL  (R) Arm/Elbow/hand: WDL  (L) Arm/Elbow/hand: WDL  Abdomen:WDL  Groin: WDL  Sacrum/Coccyx/Buttocks: blanching  (R) Leg: WDL  (L) Leg: WDL  (R) Heel/Foot/Toe: blanching  (L) Heel/Foot/Toe: blanching              Devices in place: BP Cuff and Pulse Ox     Interventions in place: Gray ear foams and Pillows     Possible skin injury found: No     Pictures uploaded into Epic: N/A  Wound Consult Placed: N/A

## 2024-06-23 NOTE — PROGRESS NOTES
Patient transferred to T201 by transport. Patient ambulated standby assist from [facility] to hospital bed. Bed in lowest position and locked with call light within reach.

## 2024-06-23 NOTE — H&P
Hospital Medicine History & Physical Note    Date of Service  6/23/2024    Primary Care Physician  Isa Santoyo P.A.-C.    Consultants  None    Code Status  Full Code    Chief Complaint  Chief Complaint   Patient presents with    Pregnancy     9 weeks pregnant    N/V     Last night approx 5pm  'I've probably thrown up every half hour since'   Endorses taking prescribed zofran without relief.       History of Presenting Illness  Vita Giordano is a 24 y.o. female who is 9 weeks pregnant who presented 6/23/2024 with nausea and vomiting.  Patient states she has had persistent nausea and vomiting since yesterday evening and has been unable to tolerate oral intake.  She reports some abdominal pain with her vomiting but denies any fevers, chills or diaphoresis.  She denies any chest pain or shortness of breath.  She denies any vaginal bleeding.  She has been taking prenatal multivitamins.  In the ER she was noted to be tachycardic.  Initial blood work revealed anion gap of 17 and CO2 of 14.  Patient attempted taking Zofran at home without relief    I discussed the plan of care with patient, family, bedside RN, and ERP .    Review of Systems  Review of Systems   Gastrointestinal:  Positive for nausea and vomiting.       Past Medical History   has a past medical history of Acute nasopharyngitis (11/2021), Allergy, Anxiety, Asthma (02/25/2022), COVID-19 (10/02/2021), COVID-19 (12/31/2021), Depression (02/25/2022), History of depression (4/29/2019), and IBD (inflammatory bowel disease).    Surgical History   has a past surgical history that includes tonsillectomy; other; knee arthroscopy (Left); dental extraction(s) (Bilateral, 2019); and knee arthroscopy, open medial patellofemoral ligament reconstruction (Right, 3/8/2022).     Family History  family history includes Allergies in her father; Cancer in her maternal aunt; Depression in her father and mother; Diabetes in her maternal grandfather and mother; Heart  Disease in her maternal grandfather and paternal grandfather; Hyperlipidemia in her maternal grandfather; Hypertension in her maternal grandfather and mother; Migraines in her mother and sister; No Known Problems in her maternal grandmother, paternal grandmother, and sister; Other in her father, mother, and sister; Psychiatric Illness in her father, mother, and sister; Seizures in her mother.   Family history reviewed with patient. There is no family history that is pertinent to the chief complaint.     Social History   reports that she quit smoking about 7 years ago. Her smoking use included cigarettes. She has never used smokeless tobacco. She reports that she does not currently use alcohol. She reports that she does not currently use drugs after having used the following drugs: Marijuana, Inhaled, and Oral. Frequency: 2.00 times per week.    Allergies  Allergies   Allergen Reactions    Hydrocodone Vomiting and Nausea       Medications  Prior to Admission Medications   Prescriptions Last Dose Informant Patient Reported? Taking?   ondansetron (ZOFRAN ODT) 4 MG TABLET DISPERSIBLE 6/23/2024 at 0730 Patient No Yes   Sig: Take 1 Tablet by mouth every 6 hours as needed for Nausea/Vomiting.   pyridoxine (VITAMIN B-6) 25 MG Tab 6/22/2024 at 1200 Patient Yes Yes   Sig: Take 25 mg by mouth every day.      Facility-Administered Medications: None       Physical Exam  Temp:  [36.7 °C (98 °F)-36.7 °C (98.1 °F)] 36.7 °C (98.1 °F)  Pulse:  [] 80  Resp:  [18] 18  BP: (118-137)/(73-87) 127/73  SpO2:  [99 %-100 %] 100 %  Blood Pressure: 127/73   Temperature: 36.7 °C (98.1 °F)   Pulse: 80   Respiration: 18   Pulse Oximetry: 100 %       Physical Exam  Vitals and nursing note reviewed.   Constitutional:       General: She is not in acute distress.  HENT:      Head: Normocephalic.      Mouth/Throat:      Mouth: Mucous membranes are dry.   Eyes:      Pupils: Pupils are equal, round, and reactive to light.   Cardiovascular:       "Rate and Rhythm: Normal rate and regular rhythm.      Pulses: Normal pulses.      Heart sounds: Normal heart sounds.   Pulmonary:      Effort: Pulmonary effort is normal.      Breath sounds: Normal breath sounds.   Abdominal:      Palpations: Abdomen is soft.      Tenderness: There is no abdominal tenderness.   Musculoskeletal:         General: No swelling.      Cervical back: Neck supple.   Skin:     General: Skin is warm.      Coloration: Skin is not jaundiced.   Neurological:      General: No focal deficit present.      Mental Status: She is alert and oriented to person, place, and time.   Psychiatric:         Mood and Affect: Mood normal.         Behavior: Behavior normal.         Laboratory:  Recent Labs     06/21/24  1304   WBC 6.6   RBC 5.49*   HEMOGLOBIN 15.7   HEMATOCRIT 44.6   MCV 81.2*   MCH 28.6   MCHC 35.2   RDW 35.5*   PLATELETCT 290   MPV 9.5     Recent Labs     06/21/24  1304 06/23/24  1228   SODIUM 135 137   POTASSIUM 4.1 3.9   CHLORIDE 102 106   CO2 16* 14*   GLUCOSE 87 97   BUN 9 6*   CREATININE 0.46* 0.43*   CALCIUM 9.4 9.8     Recent Labs     06/21/24  1304 06/23/24  1228   ALTSGPT 15 20   ASTSGOT 21 20   ALKPHOSPHAT 54 54   TBILIRUBIN 0.3 0.5   LIPASE 17  --    GLUCOSE 87 97         No results for input(s): \"NTPROBNP\" in the last 72 hours.      No results for input(s): \"TROPONINT\" in the last 72 hours.    Imaging:  No orders to display         Assessment/Plan:  Justification for Admission Status  I anticipate this patient is appropriate for observation status at this time because      Patient will need a Med/Surg bed on MEDICAL service .  The need is secondary to .    * Hyperemesis gravidarum- (present on admission)  Assessment & Plan  Supportive care with IV fluid hydration  Antiemetic admit with IV Zofran, IV Reglan and pyridoxine  Monitor BMP  Clear liquid diet    High anion gap metabolic acidosis- (present on admission)  Assessment & Plan  IV fluid hydration  Monitor BMP    8 weeks " gestation of pregnancy- (present on admission)  Assessment & Plan  Continue prenatal vitamins  Follow-up with OB in clinic        VTE prophylaxis: SCDs/TEDs    I independently reviewed pertinent clinical lab tests since admission and ordered additional follow up clinical lab tests.  I independently reviewed pertinent radiographic images and the radiologist's reports since admission and ordered additional follow up radiographic studies.  The details of the available patient records in [facility] (including laboratory tests, culture data, medications, imaging, and other pertinent diagnostic tests) and that information was utilized as warranted in today's medical decision making for this patient.  I personally evaluated the patient condition at bedside.     Care interventions include:   Review of interval medical and surgical history, current medications and outpatient medication reconciliation, interval imaging studies and radiologist interpretation, and interval laboratory values.     Aggregated care time spent evaluating, reassessing, reviewing documentation, providing care, counseling, coordinating care and managing this patient exclusive of procedures: 74 minutes

## 2024-06-23 NOTE — ASSESSMENT & PLAN NOTE
Confirmed on US: JARROD 1/26/25  Continue prenatal vitamins, folate  IV load thiamine  Follow-up with OB in clinic

## 2024-06-23 NOTE — ASSESSMENT & PLAN NOTE
Supportive care with IV fluid hydration  Start omeprazole and calcium carb po BID  Anti-emetics: Zofran and reglan  Daily BMP  Follow and replace electrolytes  Abd exam benign    6/26  Slow improvement but still not taking po consistently  Abd exam benign  Trial off IVFs  Follow daily BMP

## 2024-06-23 NOTE — ED PROVIDER NOTES
ED Provider Note    CHIEF COMPLAINT  Chief Complaint   Patient presents with    Pregnancy     9 weeks pregnant    N/V     Last night approx 5pm  'I've probably thrown up every half hour since'   Endorses taking prescribed zofran without relief.       EXTERNAL RECORDS REVIEWED  Outpatient Notes   [facility]    HPI/ROS  LIMITATION TO HISTORY   Select: : None  OUTSIDE HISTORIAN(S):  None    Vita Giordano is a 24 y.o. female who presents here for evaluation of nausea and vomiting.  Patient states that she has had hyperemesis gravidarum since she found out that she was pregnant, and states that she only takes Zofran for this.  She states that over the last day, she has had increasing vomiting every 30 minutes.  Patient has no abdominal pain, no abnormal vaginal bleeding or discharge, and appears comfortable other than the vomiting episodes.  She has no chest pain shortness of breath.  No ill contacts, no fever or chills.  Patient has OB follow-up on Friday.    PAST MEDICAL HISTORY   has a past medical history of Acute nasopharyngitis (11/2021), Allergy, Anxiety, Asthma (02/25/2022), COVID-19 (10/02/2021), COVID-19 (12/31/2021), Depression (02/25/2022), History of depression (4/29/2019), and IBD (inflammatory bowel disease).    SURGICAL HISTORY   has a past surgical history that includes tonsillectomy; other; knee arthroscopy (Left); dental extraction(s) (Bilateral, 2019); and knee arthroscopy, open medial patellofemoral ligament reconstruction (Right, 3/8/2022).    FAMILY HISTORY  Family History   Problem Relation Age of Onset    Depression Mother     Other Mother         hydrocephalus arnold-Chiari malformation    Psychiatric Illness Mother         anxiety and depression    Seizures Mother         epilepsy    Diabetes Mother     Hypertension Mother     Migraines Mother     Depression Father     Allergies Father     Psychiatric Illness Father         depression and bipolar    Other Father         GERD     "Diabetes Maternal Grandfather     Heart Disease Maternal Grandfather     Hypertension Maternal Grandfather     Hyperlipidemia Maternal Grandfather     Other Sister         IBS    Psychiatric Illness Sister         anxiety and depression    Migraines Sister     Cancer Maternal Aunt         pancreatic    No Known Problems Maternal Grandmother     No Known Problems Paternal Grandmother     Heart Disease Paternal Grandfather     No Known Problems Sister        SOCIAL HISTORY  Social History     Tobacco Use    Smoking status: Former     Current packs/day: 0.00     Types: Cigarettes     Quit date: 2017     Years since quittin.4    Smokeless tobacco: Never   Vaping Use    Vaping status: Never Used   Substance and Sexual Activity    Alcohol use: Not Currently     Comment: rarely - 1-2 x a year    Drug use: Not Currently     Frequency: 2.0 times per week     Types: Marijuana, Inhaled, Oral     Comment: Occasional - marijuana    Sexual activity: Yes     Partners: Male     Birth control/protection: Implant       CURRENT MEDICATIONS  Home Medications       Reviewed by Nancy Enriquez R.N. (Registered Nurse) on 24 at 1152  Med List Status: Partial     Medication Last Dose Status   fluticasone (FLONASE) 50 MCG/ACT nasal spray  Active   methylPREDNISolone (MEDROL DOSEPAK) 4 MG Tablet Therapy Pack  Active   ondansetron (ZOFRAN ODT) 4 MG TABLET DISPERSIBLE  Active                    ALLERGIES  Allergies   Allergen Reactions    Norco [Hydrocodone-Acetaminophen] Vomiting       PHYSICAL EXAM  VITAL SIGNS: /79   Pulse 87   Temp 36.7 °C (98 °F) (Temporal)   Resp 18   Ht 1.626 m (5' 4\")   Wt 74.8 kg (164 lb 14.5 oz)   LMP 2024 (Exact Date)   SpO2 100%   BMI 28.31 kg/m²    Constitutional: Well developed, well nourished. mild acute distress.  HEENT: Normocephalic, atraumatic. Posterior pharynx clear and moist.  Eyes:  EOMI. Normal sclera.  Neck: Supple, Full range of motion, nontender.  Chest/Pulmonary: " clear to ausculation. Symmetrical expansion.   Cardio: Regular rate and rhythm with no murmur.   Abdomen: Soft, nontender. No peritoneal signs. No guarding.   Musculoskeletal: No deformity, no edema, neurovascular intact.   Neuro: Clear speech, appropriate, cooperative, cranial nerves II-XII grossly intact.  Psych: Normal mood and affect      EKG/LABS    I have independently interpreted this EKG    RADIOLOGY/PROCEDURES   none      COURSE & MEDICAL DECISION MAKING    ASSESSMENT, COURSE AND PLAN  Care Narrative: This is a 24-year-old female here for evaluation of pregnancy.  The patient feels better after IV fluids, and pyridoxine.  Pt has had zofran multiple times, pta without relief.     1:37 PM  The pt is feeling better at this time, after the medications and fluids.     1:54 PM  Pt now vomiting again. I will add Reglan, and admit for iv anti emetics and iv fluids.     2:17 PM  Dr. Acuña will admit the pt.       DISPOSITION AND DISCUSSIONS  I have discussed management of the patient with the following physicians and FIDEL's: None    FINAL DIAGNOSIS  1. Hyperemesis gravidarum                 Electronically signed by: Eb Short D.O., 6/23/2024 12:31 PM

## 2024-06-24 LAB
ANION GAP SERPL CALC-SCNC: 14 MMOL/L (ref 7–16)
BUN SERPL-MCNC: 4 MG/DL (ref 8–22)
CALCIUM SERPL-MCNC: 8.4 MG/DL (ref 8.5–10.5)
CHLORIDE SERPL-SCNC: 109 MMOL/L (ref 96–112)
CO2 SERPL-SCNC: 13 MMOL/L (ref 20–33)
CREAT SERPL-MCNC: 0.3 MG/DL (ref 0.5–1.4)
ERYTHROCYTE [DISTWIDTH] IN BLOOD BY AUTOMATED COUNT: 37.8 FL (ref 35.9–50)
GFR SERPLBLD CREATININE-BSD FMLA CKD-EPI: 150 ML/MIN/1.73 M 2
GLUCOSE SERPL-MCNC: 85 MG/DL (ref 65–99)
HCT VFR BLD AUTO: 35.7 % (ref 37–47)
HGB BLD-MCNC: 12.6 G/DL (ref 12–16)
MCH RBC QN AUTO: 29.6 PG (ref 27–33)
MCHC RBC AUTO-ENTMCNC: 35.3 G/DL (ref 32.2–35.5)
MCV RBC AUTO: 83.8 FL (ref 81.4–97.8)
PLATELET # BLD AUTO: 252 K/UL (ref 164–446)
PMV BLD AUTO: 10.5 FL (ref 9–12.9)
POTASSIUM SERPL-SCNC: 3.8 MMOL/L (ref 3.6–5.5)
RBC # BLD AUTO: 4.26 M/UL (ref 4.2–5.4)
SODIUM SERPL-SCNC: 136 MMOL/L (ref 135–145)
WBC # BLD AUTO: 9.4 K/UL (ref 4.8–10.8)

## 2024-06-24 PROCEDURE — 700102 HCHG RX REV CODE 250 W/ 637 OVERRIDE(OP)

## 2024-06-24 PROCEDURE — A9270 NON-COVERED ITEM OR SERVICE: HCPCS

## 2024-06-24 PROCEDURE — 700102 HCHG RX REV CODE 250 W/ 637 OVERRIDE(OP): Performed by: INTERNAL MEDICINE

## 2024-06-24 PROCEDURE — 99233 SBSQ HOSP IP/OBS HIGH 50: CPT | Performed by: INTERNAL MEDICINE

## 2024-06-24 PROCEDURE — 96376 TX/PRO/DX INJ SAME DRUG ADON: CPT

## 2024-06-24 PROCEDURE — 36415 COLL VENOUS BLD VENIPUNCTURE: CPT

## 2024-06-24 PROCEDURE — A9270 NON-COVERED ITEM OR SERVICE: HCPCS | Performed by: INTERNAL MEDICINE

## 2024-06-24 PROCEDURE — 700111 HCHG RX REV CODE 636 W/ 250 OVERRIDE (IP): Performed by: INTERNAL MEDICINE

## 2024-06-24 PROCEDURE — G0378 HOSPITAL OBSERVATION PER HR: HCPCS

## 2024-06-24 PROCEDURE — 700111 HCHG RX REV CODE 636 W/ 250 OVERRIDE (IP): Mod: JZ

## 2024-06-24 PROCEDURE — 700105 HCHG RX REV CODE 258

## 2024-06-24 PROCEDURE — 85027 COMPLETE CBC AUTOMATED: CPT

## 2024-06-24 PROCEDURE — 80048 BASIC METABOLIC PNL TOTAL CA: CPT

## 2024-06-24 RX ORDER — LORAZEPAM 2 MG/ML
0.5 INJECTION INTRAMUSCULAR EVERY 4 HOURS PRN
Status: DISCONTINUED | OUTPATIENT
Start: 2024-06-24 | End: 2024-06-27 | Stop reason: HOSPADM

## 2024-06-24 RX ADMIN — METOCLOPRAMIDE 10 MG: 5 INJECTION, SOLUTION INTRAMUSCULAR; INTRAVENOUS at 03:56

## 2024-06-24 RX ADMIN — SODIUM CHLORIDE: 9 INJECTION, SOLUTION INTRAVENOUS at 02:19

## 2024-06-24 RX ADMIN — LORAZEPAM 0.5 MG: 2 INJECTION INTRAMUSCULAR; INTRAVENOUS at 13:56

## 2024-06-24 RX ADMIN — Medication 25 MG: at 05:43

## 2024-06-24 RX ADMIN — METOCLOPRAMIDE 10 MG: 5 INJECTION, SOLUTION INTRAMUSCULAR; INTRAVENOUS at 19:37

## 2024-06-24 RX ADMIN — Medication 1 LOZENGE: at 03:12

## 2024-06-24 RX ADMIN — SODIUM CHLORIDE: 9 INJECTION, SOLUTION INTRAVENOUS at 19:40

## 2024-06-24 RX ADMIN — SODIUM CHLORIDE: 9 INJECTION, SOLUTION INTRAVENOUS at 11:16

## 2024-06-24 RX ADMIN — ONDANSETRON 4 MG: 2 INJECTION INTRAMUSCULAR; INTRAVENOUS at 11:11

## 2024-06-24 RX ADMIN — ONDANSETRON 4 MG: 2 INJECTION INTRAMUSCULAR; INTRAVENOUS at 16:26

## 2024-06-24 ASSESSMENT — COGNITIVE AND FUNCTIONAL STATUS - GENERAL
DAILY ACTIVITIY SCORE: 24
SUGGESTED CMS G CODE MODIFIER MOBILITY: CH
SUGGESTED CMS G CODE MODIFIER DAILY ACTIVITY: CH
MOBILITY SCORE: 24

## 2024-06-24 ASSESSMENT — PAIN DESCRIPTION - PAIN TYPE
TYPE: ACUTE PAIN

## 2024-06-24 ASSESSMENT — ENCOUNTER SYMPTOMS
VOMITING: 1
NAUSEA: 1

## 2024-06-24 NOTE — DISCHARGE PLANNING
Care Transition Team Assessment    Emergency Contact is pt's sister Sita Murphy ([phone])     CM RN met with pt at bedside to complete discharge assessment and chart review was completed to obtain the information used in this assessment. Pt is A/Ox4 and agreeable to assessment. Pt verified information on face sheet.     - Prior to admission, pt lived with her boyfriend Phil in a single story house at the address verified on the face sheet: [address]  [place] [address]  - Per pt, sister family and partner are good support for discharge back into community. Phil stated he is able to provide pt with transportation home upon discharge.   - Pt stated to be independent with ADL/IADLs and drives self-prior to admission.   - Per pt, no DME or O2 was owned/used prior to admission   - Pt is employed and insured through [employer]   - Preferred pharmacy is the [facility] on [address] [place], [place] [address]    Information Source  Orientation Level: Oriented X4  Information Given By: Patient  Who is responsible for making decisions for patient? : Patient    Readmission Evaluation  Is this a readmission?: No    Elopement Risk  Legal Hold: No  Ambulatory or Self Mobile in Wheelchair: Yes  Disoriented: No  Psychiatric Symptoms: None  History of Wandering: No  Elopement this Admit: No  Vocalizing Wanting to Leave: No  Displays Behaviors, Body Language Wanting to Leave: No-Not at Risk for Elopement  Elopement Risk: Not at Risk for Elopement    Interdisciplinary Discharge Planning  Lives with - Patient's Self Care Capacity: Significant Other  Patient or legal guardian wants to designate a caregiver: No  Support Systems: Family Member(s), Friends / Neighbors  Housing / Facility: [address]    Discharge Preparedness  What is your plan after discharge?: Home with help  What are your discharge supports?: Sibling, Partner  Prior Functional Level: Ambulatory, Drives Self, Independent with Activities of  Daily Living, Independent with Medication Management  Difficulity with ADLs: None  Difficulity with IADLs: None    Functional Assesment  Prior Functional Level: Ambulatory, Drives Self, Independent with Activities of Daily Living, Independent with Medication Management    Finances  Financial Barriers to Discharge: No  Prescription Coverage: Yes    Vision / Hearing Impairment  Vision Impairment : Yes  Right Eye Vision: Impaired, Wears Glasses  Left Eye Vision: Impaired, Wears Glasses  Hearing Impairment : No    Advance Directive  Advance Directive?: None    Domestic Abuse  Have you ever been the victim of abuse or violence?: No  Possible Abuse/Neglect Reported to:: Not Applicable    Anticipated Discharge Information  Discharge Disposition: Discharged to home/self care (01)  Discharge Address: [address] [place], [place] [address] [address]  Discharge Contact Phone Number: [phone]

## 2024-06-24 NOTE — PROGRESS NOTES
Assumed pt care. Pt states feeling better after medication administration. Family present at the bedside. Updated the pt and family on the POC. All questions answered. Pt resting in bed with call light and belongings within reach.

## 2024-06-24 NOTE — PROGRESS NOTES
4 Eyes Skin Assessment Completed by RENAY Nuñez and RENAY Hurd.    Head WDL  Ears WDL  Nose WDL  Mouth WDL  Neck WDL  Breast/Chest WDL  Shoulder Blades WDL  Spine WDL  (R) Arm/Elbow/Hand WDL  (L) Arm/Elbow/Hand WDL  Abdomen WDL  Groin WDL  Scrotum/Coccyx/Buttocks WDL  (R) Leg WDL  (L) Leg WDL  (R) Heel/Foot/Toe WDL  (L) Heel/Foot/Toe WDL          Devices In Places Blood Pressure Cuff      Interventions In Place Pillows    Possible Skin Injury No    Pictures Uploaded Into Epic N/A  Wound Consult Placed N/A  RN Wound Prevention Protocol Ordered No

## 2024-06-24 NOTE — PROGRESS NOTES
Received report from RN. Patient arrived via transport. Patient is A/O x4 on RA. Patient has all of her belongings with her. Patient was able to transfer to bed by self. Patient denies any reports of pain. Patient was medicated per MAR for Nausea and vomiting. Patient had x1 episode of emesis. Patient was provided water. Bed alarm in place. Patient orientated to call light. Bed is locked and in the lowest position.

## 2024-06-24 NOTE — CARE PLAN
The patient is Stable - Low risk of patient condition declining or worsening    Shift Goals  Clinical Goals: N/V control, IVF, Hydrate  Patient Goals: N/V control, Tolerate diet  Family Goals: Feel better    Progress made toward(s) clinical / shift goals:      Problem: Knowledge Deficit - Standard  Goal: Patient and family/care givers will demonstrate understanding of plan of care, disease process/condition, diagnostic tests and medications  Description: Target End Date:  1-3 days or as soon as patient condition allows    Document in Patient Education    1.  Patient and family/caregiver oriented to unit, equipment, visitation policy and means for communicating concern  2.  Complete/review Learning Assessment  3.  Assess knowledge level of disease process/condition, treatment plan, diagnostic tests and medications  4.  Explain disease process/condition, treatment plan, diagnostic tests and medications  Outcome: Progressing     Problem: Gastrointestinal Irritability  Goal: Nausea and vomiting will be absent or improve  Description: Target End Date:  Prior to discharge or change in level of care    Document on I/O and Assessment flowsheets    1.  Assess for history, duration, frequency, severity, and potential precipitating factors  2.  Administer antiemetics as ordered  3.  Emesis basin within easy reach of the patient, but out of sight if psychogenic component  4.  Eliminate strong odors from surroundings  5.  Introduce cold water, ice chips, ruy products, and room temperature broth or bouillon if tolerated and appropriate to the patient's diet  6.  Encourage small amounts of bland, simple foods like broth, rice, bananas, Jell-O, crackers or toast if tolerated and appropriate to patient's diet  7.  Position the patient upright while eating and for 1 to 2 hours post-meal  8.  Encourage nonpharmacological nausea control techniques such as relaxation, guided imagery, music therapy, distraction, or deep breathing  exercises  Outcome: Progressing

## 2024-06-24 NOTE — PROGRESS NOTES
Report received. Assumed care. Pt AAOx4. Focused Assessment complete. Pt actively vomiting x4 Discussed room transfer. SO and pt. notified of room transfer via phone.Pt denies pain at rest, but mild pain to abdomen with vomiting. Pt was update for the care for the day. White board updated, All question answered. Pt has call light within reach, bed is in the lowest position. Pt has no other needs at this time.

## 2024-06-24 NOTE — CARE PLAN
The patient is Stable - Low risk of patient condition declining or worsening    Shift Goals  Clinical Goals: IV fluids, hydrate, and control N/V  Patient Goals: Tolerate food and control N/V  Family Goals: N/V control    Progress made toward(s) clinical / shift goals:      Problem: Gastrointestinal Irritability  Goal: Nausea and vomiting will be absent or improve as seen by the patient having 3 or less episodes of emesis during this shift.   Outcome: Progressing  Note: Patient stated that the nausea and vomiting began yesterday at 1700 on 6/22. Patient has had zero episode of emesis since she arrived on the unit this morning. Patient received antiemetics as prescribed and reported relief from the medications. Patient has an emesis within reach. Patient has attempted nonpharmacological nausea control techniques with no relief.        Patient is not progressing towards the following goals:

## 2024-06-24 NOTE — PROGRESS NOTES
Hospital Medicine Daily Progress Note    Date of Service  6/24/2024    Chief Complaint  Vita Giordano is a 24 y.o. female admitted 6/23/2024 with nausea and vomiting    Hospital Course  24 y.o. female who is 9 weeks pregnant who presented 6/23/2024 with nausea and vomiting.  Patient states she has had persistent nausea and vomiting since yesterday evening and has been unable to tolerate oral intake.  She reports some abdominal pain with her vomiting but denies any fevers, chills or diaphoresis.  She denies any chest pain or shortness of breath.  She denies any vaginal bleeding.  She has been taking prenatal multivitamins.  In the ER she was noted to be tachycardic.  Initial blood work revealed anion gap of 17 and CO2 of 14.  Patient attempted taking Zofran at home without relief     Interval Problem Update  Patient continues to have intractable nausea and vomiting and is unable to tolerate oral intake.  Continue IV fluid hydration, IV Zofran.  I will add IV Ativan for nausea    I have discussed this patient's plan of care and discharge plan at IDT rounds today with Case Management, Nursing, Nursing leadership, and other members of the IDT team.    Consultants/Specialty  None    Code Status  Full Code    Disposition  The patient is not medically cleared for discharge to home or a post-acute facility.      I have placed the appropriate orders for post-discharge needs.    Review of Systems  Review of Systems   Gastrointestinal:  Positive for nausea and vomiting.        Physical Exam  Temp:  [36.7 °C (98 °F)-37.1 °C (98.7 °F)] 37.1 °C (98.7 °F)  Pulse:  [] 76  Resp:  [18] 18  BP: (118-138)/(73-87) 129/76  SpO2:  [98 %-100 %] 98 %    Physical Exam  Vitals and nursing note reviewed.   Constitutional:       General: She is not in acute distress.  HENT:      Head: Normocephalic.      Mouth/Throat:      Mouth: Mucous membranes are moist.   Eyes:      Pupils: Pupils are equal, round, and reactive to light.    Cardiovascular:      Rate and Rhythm: Normal rate and regular rhythm.      Pulses: Normal pulses.      Heart sounds: Normal heart sounds.   Pulmonary:      Effort: Pulmonary effort is normal.      Breath sounds: Normal breath sounds.   Abdominal:      Palpations: Abdomen is soft.      Tenderness: There is no abdominal tenderness.   Musculoskeletal:         General: No swelling.      Cervical back: Neck supple.   Skin:     General: Skin is warm.      Coloration: Skin is not jaundiced.   Neurological:      General: No focal deficit present.      Mental Status: She is alert and oriented to person, place, and time.   Psychiatric:         Mood and Affect: Mood normal.         Behavior: Behavior normal.         Fluids    Intake/Output Summary (Last 24 hours) at 6/24/2024 0916  Last data filed at 6/23/2024 1910  Gross per 24 hour   Intake 0 ml   Output 650 ml   Net -650 ml       Laboratory  Recent Labs     06/21/24  1304 06/24/24  0148   WBC 6.6 9.4   RBC 5.49* 4.26   HEMOGLOBIN 15.7 12.6   HEMATOCRIT 44.6 35.7*   MCV 81.2* 83.8   MCH 28.6 29.6   MCHC 35.2 35.3   RDW 35.5* 37.8   PLATELETCT 290 252   MPV 9.5 10.5     Recent Labs     06/21/24  1304 06/23/24  1228 06/24/24  0148   SODIUM 135 137 136   POTASSIUM 4.1 3.9 3.8   CHLORIDE 102 106 109   CO2 16* 14* 13*   GLUCOSE 87 97 85   BUN 9 6* 4*   CREATININE 0.46* 0.43* 0.30*   CALCIUM 9.4 9.8 8.4*                   Imaging  No orders to display        Assessment/Plan  * Hyperemesis gravidarum- (present on admission)  Assessment & Plan  Supportive care with IV fluid hydration  Antiemetic admit with IV Zofran, IV ativan  Monitor BMP  Clear liquid diet, advance as tolerated     High anion gap metabolic acidosis- (present on admission)  Assessment & Plan  IV fluid hydration  Monitor BMP    8 weeks gestation of pregnancy- (present on admission)  Assessment & Plan  Continue prenatal vitamins  Follow-up with OB in clinic         VTE prophylaxis: SCD    I have performed a  physical exam and reviewed and updated ROS and Plan today (6/24/2024). In review of yesterday's note (6/23/2024), there are no changes except as documented above.    I spent 51 minutes, reviewing the chart, obtaining and/or reviewing separately obtained history. Performing a medically appropriate examination and evaluation.  Counseling and educating the patient. Ordering and reviewing medications, tests, or procedures.  Discussing the case with nephrology.  Documenting clinical information in EPIC. Independently interpreting results and communicating results to patient. Discussing future disposition of care with patient, RN and case management.

## 2024-06-25 LAB
ANION GAP SERPL CALC-SCNC: 15 MMOL/L (ref 7–16)
BUN SERPL-MCNC: 3 MG/DL (ref 8–22)
CALCIUM SERPL-MCNC: 8.5 MG/DL (ref 8.5–10.5)
CHLORIDE SERPL-SCNC: 110 MMOL/L (ref 96–112)
CO2 SERPL-SCNC: 8 MMOL/L (ref 20–33)
CREAT SERPL-MCNC: 0.29 MG/DL (ref 0.5–1.4)
ERYTHROCYTE [DISTWIDTH] IN BLOOD BY AUTOMATED COUNT: 36.8 FL (ref 35.9–50)
GFR SERPLBLD CREATININE-BSD FMLA CKD-EPI: 151 ML/MIN/1.73 M 2
GLUCOSE SERPL-MCNC: 69 MG/DL (ref 65–99)
HCT VFR BLD AUTO: 38.4 % (ref 37–47)
HGB BLD-MCNC: 12.8 G/DL (ref 12–16)
MCH RBC QN AUTO: 28.3 PG (ref 27–33)
MCHC RBC AUTO-ENTMCNC: 33.3 G/DL (ref 32.2–35.5)
MCV RBC AUTO: 84.8 FL (ref 81.4–97.8)
PLATELET # BLD AUTO: 222 K/UL (ref 164–446)
PMV BLD AUTO: 10.3 FL (ref 9–12.9)
POTASSIUM SERPL-SCNC: 3.7 MMOL/L (ref 3.6–5.5)
RBC # BLD AUTO: 4.53 M/UL (ref 4.2–5.4)
SODIUM SERPL-SCNC: 133 MMOL/L (ref 135–145)
WBC # BLD AUTO: 8.1 K/UL (ref 4.8–10.8)

## 2024-06-25 PROCEDURE — 85027 COMPLETE CBC AUTOMATED: CPT

## 2024-06-25 PROCEDURE — 700102 HCHG RX REV CODE 250 W/ 637 OVERRIDE(OP): Performed by: HOSPITALIST

## 2024-06-25 PROCEDURE — A9270 NON-COVERED ITEM OR SERVICE: HCPCS | Performed by: HOSPITALIST

## 2024-06-25 PROCEDURE — 96376 TX/PRO/DX INJ SAME DRUG ADON: CPT

## 2024-06-25 PROCEDURE — 700111 HCHG RX REV CODE 636 W/ 250 OVERRIDE (IP): Performed by: HOSPITALIST

## 2024-06-25 PROCEDURE — A9270 NON-COVERED ITEM OR SERVICE: HCPCS | Performed by: INTERNAL MEDICINE

## 2024-06-25 PROCEDURE — 700111 HCHG RX REV CODE 636 W/ 250 OVERRIDE (IP): Mod: JZ

## 2024-06-25 PROCEDURE — 700105 HCHG RX REV CODE 258

## 2024-06-25 PROCEDURE — 700105 HCHG RX REV CODE 258: Performed by: HOSPITALIST

## 2024-06-25 PROCEDURE — 36415 COLL VENOUS BLD VENIPUNCTURE: CPT

## 2024-06-25 PROCEDURE — G0378 HOSPITAL OBSERVATION PER HR: HCPCS

## 2024-06-25 PROCEDURE — 99232 SBSQ HOSP IP/OBS MODERATE 35: CPT | Performed by: HOSPITALIST

## 2024-06-25 PROCEDURE — 80048 BASIC METABOLIC PNL TOTAL CA: CPT

## 2024-06-25 PROCEDURE — 700111 HCHG RX REV CODE 636 W/ 250 OVERRIDE (IP): Performed by: INTERNAL MEDICINE

## 2024-06-25 PROCEDURE — 700102 HCHG RX REV CODE 250 W/ 637 OVERRIDE(OP): Performed by: INTERNAL MEDICINE

## 2024-06-25 PROCEDURE — 96375 TX/PRO/DX INJ NEW DRUG ADDON: CPT

## 2024-06-25 RX ORDER — DEXTROSE MONOHYDRATE AND SODIUM CHLORIDE 5; .45 G/100ML; G/100ML
INJECTION, SOLUTION INTRAVENOUS CONTINUOUS
Status: DISCONTINUED | OUTPATIENT
Start: 2024-06-25 | End: 2024-06-26

## 2024-06-25 RX ORDER — SODIUM CHLORIDE, SODIUM LACTATE, POTASSIUM CHLORIDE, AND CALCIUM CHLORIDE .6; .31; .03; .02 G/100ML; G/100ML; G/100ML; G/100ML
500 INJECTION, SOLUTION INTRAVENOUS ONCE
Status: COMPLETED | OUTPATIENT
Start: 2024-06-25 | End: 2024-06-25

## 2024-06-25 RX ORDER — CALCIUM CARBONATE 500 MG/1
500 TABLET, CHEWABLE ORAL 2 TIMES DAILY
Status: DISCONTINUED | OUTPATIENT
Start: 2024-06-25 | End: 2024-06-27 | Stop reason: HOSPADM

## 2024-06-25 RX ORDER — OMEPRAZOLE 20 MG/1
20 CAPSULE, DELAYED RELEASE ORAL 2 TIMES DAILY
Status: DISCONTINUED | OUTPATIENT
Start: 2024-06-25 | End: 2024-06-27 | Stop reason: HOSPADM

## 2024-06-25 RX ADMIN — ONDANSETRON 4 MG: 4 TABLET, ORALLY DISINTEGRATING ORAL at 21:10

## 2024-06-25 RX ADMIN — DEXTROSE AND SODIUM CHLORIDE: 5; 450 INJECTION, SOLUTION INTRAVENOUS at 14:01

## 2024-06-25 RX ADMIN — SODIUM CHLORIDE: 9 INJECTION, SOLUTION INTRAVENOUS at 03:39

## 2024-06-25 RX ADMIN — PRENATAL WITH FERROUS FUM AND FOLIC ACID 1 TABLET: 3080; 920; 120; 400; 22; 1.84; 3; 20; 10; 1; 12; 200; 27; 25; 2 TABLET ORAL at 09:24

## 2024-06-25 RX ADMIN — ONDANSETRON 4 MG: 2 INJECTION INTRAMUSCULAR; INTRAVENOUS at 11:38

## 2024-06-25 RX ADMIN — OMEPRAZOLE 20 MG: 20 CAPSULE, DELAYED RELEASE ORAL at 13:57

## 2024-06-25 RX ADMIN — THIAMINE HYDROCHLORIDE 500 MG: 100 INJECTION, SOLUTION INTRAMUSCULAR; INTRAVENOUS at 14:02

## 2024-06-25 RX ADMIN — ONDANSETRON 4 MG: 4 TABLET, ORALLY DISINTEGRATING ORAL at 17:13

## 2024-06-25 RX ADMIN — LORAZEPAM 0.5 MG: 2 INJECTION INTRAMUSCULAR; INTRAVENOUS at 02:34

## 2024-06-25 RX ADMIN — LORAZEPAM 0.5 MG: 2 INJECTION INTRAMUSCULAR; INTRAVENOUS at 10:23

## 2024-06-25 RX ADMIN — ANTACID TABLETS 500 MG: 500 TABLET, CHEWABLE ORAL at 13:58

## 2024-06-25 RX ADMIN — METOCLOPRAMIDE 10 MG: 5 INJECTION, SOLUTION INTRAMUSCULAR; INTRAVENOUS at 13:57

## 2024-06-25 RX ADMIN — METOCLOPRAMIDE 10 MG: 5 INJECTION, SOLUTION INTRAMUSCULAR; INTRAVENOUS at 09:24

## 2024-06-25 RX ADMIN — Medication 25 MG: at 09:24

## 2024-06-25 RX ADMIN — LORAZEPAM 0.5 MG: 2 INJECTION INTRAMUSCULAR; INTRAVENOUS at 14:51

## 2024-06-25 RX ADMIN — SODIUM CHLORIDE, POTASSIUM CHLORIDE, SODIUM LACTATE AND CALCIUM CHLORIDE 500 ML: 600; 310; 30; 20 INJECTION, SOLUTION INTRAVENOUS at 03:02

## 2024-06-25 RX ADMIN — ONDANSETRON 4 MG: 2 INJECTION INTRAMUSCULAR; INTRAVENOUS at 07:32

## 2024-06-25 ASSESSMENT — ENCOUNTER SYMPTOMS
HEADACHES: 0
PALPITATIONS: 0
COUGH: 0
ABDOMINAL PAIN: 0
DIZZINESS: 0
NAUSEA: 1
FEVER: 0
BLOOD IN STOOL: 0
SHORTNESS OF BREATH: 0
LOSS OF CONSCIOUSNESS: 0
VOMITING: 1
BACK PAIN: 0
CHILLS: 0
DIARRHEA: 0

## 2024-06-25 ASSESSMENT — PAIN DESCRIPTION - PAIN TYPE
TYPE: ACUTE PAIN

## 2024-06-25 NOTE — PROGRESS NOTES
Rec'd report from day shift RN. Assumed pt care. Assessment completed. AA&OX4. Reports nausea with vomiting present. Medicated per MAR. Pt is up self, ambulates to the bathroom and maintains steady gait. Bed in lowest position, bed locked, treaded socks in place, RN and CNA numbers provided, call light within reach.

## 2024-06-25 NOTE — CARE PLAN
The patient is Stable - Low risk of patient condition declining or worsening    Shift Goals  Clinical Goals: Pt will be able to sleep 4 hours without vomiting.  Patient Goals: emesis control  Family Goals: N/V control    Progress made toward(s) clinical / shift goals:  Pt was able to rest, emesis control with PRN meds.     Patient is not progressing towards the following goals:

## 2024-06-25 NOTE — PROGRESS NOTES
Gus from Lab called with critical result of CO2 8 at 0239. Critical lab result read back to Gus.   Dr. Luis Alberto Nichols notified of critical lab result at 0243.  Critical lab result read back by Dr. Luis Alberto Nichols.

## 2024-06-25 NOTE — PROGRESS NOTES
Hospital Medicine Daily Progress Note    Date of Service  6/25/2024    Chief Complaint  Vita Giordano is a 24 y.o. female admitted 6/23/2024 with N/V    Hospital Course  25yo at 9wks IUP presenting with hyperemesis gravidarum    Interval Problem Update  Feels a little better today.  Able to keep water and gatorade down.  Has been up and walking without issue.  No vag bleeding or spotting.  No blood or coffee ground emesis.  Belly hurts when she vomits but no pain otherwise    I have discussed this patient's plan of care and discharge plan at IDT rounds today with Case Management, Nursing, Nursing leadership, and other members of the IDT team.    Consultants/Specialty      Code Status  Full Code    Disposition  The patient is not medically cleared for discharge to home or a post-acute facility.      I have placed the appropriate orders for post-discharge needs.    Review of Systems  Review of Systems   Constitutional:  Negative for chills and fever.   Respiratory:  Negative for cough and shortness of breath.    Cardiovascular:  Negative for chest pain, palpitations and leg swelling.   Gastrointestinal:  Positive for nausea and vomiting. Negative for abdominal pain, blood in stool, diarrhea and melena.   Genitourinary:  Negative for dysuria and urgency.   Musculoskeletal:  Negative for back pain.   Skin:  Negative for rash.   Neurological:  Negative for dizziness, loss of consciousness and headaches.        Physical Exam  Temp:  [35.9 °C (96.7 °F)-36.5 °C (97.7 °F)] 36.2 °C (97.1 °F)  Pulse:  [73-90] 89  Resp:  [17-18] 17  BP: (117-140)/(76-90) 129/90  SpO2:  [97 %-100 %] 97 %    Physical Exam  Constitutional:       General: She is not in acute distress.     Appearance: Normal appearance. She is well-developed. She is not diaphoretic.   HENT:      Head: Normocephalic and atraumatic.   Neck:      Vascular: No JVD.   Cardiovascular:      Rate and Rhythm: Normal rate and regular rhythm.      Heart sounds: No murmur  heard.  Pulmonary:      Effort: Pulmonary effort is normal. No respiratory distress.      Breath sounds: No stridor. No wheezing or rales.   Abdominal:      Palpations: Abdomen is soft.      Tenderness: There is no abdominal tenderness. There is no guarding or rebound.   Musculoskeletal:      Right lower leg: No edema.      Left lower leg: No edema.   Skin:     General: Skin is warm and dry.      Capillary Refill: Capillary refill takes less than 2 seconds.      Findings: No rash.   Neurological:      Mental Status: She is oriented to person, place, and time.   Psychiatric:         Mood and Affect: Mood normal.         Behavior: Behavior normal.         Thought Content: Thought content normal.         Fluids    Intake/Output Summary (Last 24 hours) at 6/25/2024 0710  Last data filed at 6/25/2024 0600  Gross per 24 hour   Intake 1600 ml   Output 850 ml   Net 750 ml       Laboratory  Recent Labs     06/24/24  0148 06/25/24  0141   WBC 9.4 8.1   RBC 4.26 4.53   HEMOGLOBIN 12.6 12.8   HEMATOCRIT 35.7* 38.4   MCV 83.8 84.8   MCH 29.6 28.3   MCHC 35.3 33.3   RDW 37.8 36.8   PLATELETCT 252 222   MPV 10.5 10.3     Recent Labs     06/23/24  1228 06/24/24  0148 06/25/24  0141   SODIUM 137 136 133*   POTASSIUM 3.9 3.8 3.7   CHLORIDE 106 109 110   CO2 14* 13* 8*   GLUCOSE 97 85 69   BUN 6* 4* 3*   CREATININE 0.43* 0.30* 0.29*   CALCIUM 9.8 8.4* 8.5                   Imaging  No orders to display        Assessment/Plan  * Hyperemesis gravidarum- (present on admission)  Assessment & Plan  Supportive care with IV fluid hydration  Start omeprazole and calcium carb po BID  Anti-emetics: Zofran and reglan  Daily BMP  Follow and replace electrolytes  Abd exam benign    High anion gap metabolic acidosis- (present on admission)  Assessment & Plan  IV fluid hydration: DC NS and start D5.45NS  Monitor BMP daily    8 weeks gestation of pregnancy- (present on admission)  Assessment & Plan  Confirmed on US: JARROD 1/26/25  Continue prenatal  vitamins, folate  IV load thiamine  Follow-up with OB in clinic         VTE prophylaxis: VTE Selection    I have performed a physical exam and reviewed and updated ROS and Plan today (6/25/2024). In review of yesterday's note (6/24/2024), there are no changes except as documented above.

## 2024-06-25 NOTE — DISCHARGE PLANNING
Case Management Discharge Planning    Admission Date: 6/23/2024  GMLOS:    ALOS: 0    6-Clicks ADL Score: 24  6-Clicks Mobility Score: 24    Anticipated Discharge Dispo: Discharge Disposition: Discharged to home/self care (01)  Discharge Address: [address]  [place] [address]  Discharge Contact Phone Number: [phone]    DME Needed: No    Action(s) Taken:   Pt discussed during IDT rounds. Per MD, pt is not yet medically clear.     At this time, anticipated discharge is home with significant other upon medical clearance.     Escalations Completed: None    Medically Clear: No    Next Steps:  CM RN to follow up with medical team to discuss discharge barriers or needs.     Barriers to Discharge: Medical clearance    Is the patient up for discharge tomorrow: No

## 2024-06-25 NOTE — CARE PLAN
Problem: Gastrointestinal Irritability  Goal: Nausea and vomiting will be absent or improve  Outcome: Progressing  Note: Using prn anti-emetics, patient has not has an emesis this shift   The patient is Stable - Low risk of patient condition declining or worsening    Shift Goals  Clinical Goals: Pt will be able to sleep 4 hours without vomiting.  Patient Goals: emesis control  Family Goals: N/V control    Progress made toward(s) clinical / shift goals:  progressing    Patient is not progressing towards the following goals:

## 2024-06-26 LAB
ANION GAP SERPL CALC-SCNC: 14 MMOL/L (ref 7–16)
BUN SERPL-MCNC: 2 MG/DL (ref 8–22)
CALCIUM SERPL-MCNC: 8.8 MG/DL (ref 8.5–10.5)
CHLORIDE SERPL-SCNC: 109 MMOL/L (ref 96–112)
CO2 SERPL-SCNC: 13 MMOL/L (ref 20–33)
CREAT SERPL-MCNC: 0.34 MG/DL (ref 0.5–1.4)
GFR SERPLBLD CREATININE-BSD FMLA CKD-EPI: 145 ML/MIN/1.73 M 2
GLUCOSE SERPL-MCNC: 114 MG/DL (ref 65–99)
MAGNESIUM SERPL-MCNC: 2 MG/DL (ref 1.5–2.5)
POTASSIUM SERPL-SCNC: 3.2 MMOL/L (ref 3.6–5.5)
SODIUM SERPL-SCNC: 136 MMOL/L (ref 135–145)

## 2024-06-26 PROCEDURE — A9270 NON-COVERED ITEM OR SERVICE: HCPCS

## 2024-06-26 PROCEDURE — 83735 ASSAY OF MAGNESIUM: CPT

## 2024-06-26 PROCEDURE — A9270 NON-COVERED ITEM OR SERVICE: HCPCS | Performed by: INTERNAL MEDICINE

## 2024-06-26 PROCEDURE — 700111 HCHG RX REV CODE 636 W/ 250 OVERRIDE (IP)

## 2024-06-26 PROCEDURE — 700102 HCHG RX REV CODE 250 W/ 637 OVERRIDE(OP): Performed by: HOSPITALIST

## 2024-06-26 PROCEDURE — 96376 TX/PRO/DX INJ SAME DRUG ADON: CPT

## 2024-06-26 PROCEDURE — 700111 HCHG RX REV CODE 636 W/ 250 OVERRIDE (IP): Performed by: HOSPITALIST

## 2024-06-26 PROCEDURE — 700102 HCHG RX REV CODE 250 W/ 637 OVERRIDE(OP)

## 2024-06-26 PROCEDURE — A9270 NON-COVERED ITEM OR SERVICE: HCPCS | Performed by: HOSPITALIST

## 2024-06-26 PROCEDURE — 36415 COLL VENOUS BLD VENIPUNCTURE: CPT

## 2024-06-26 PROCEDURE — 80048 BASIC METABOLIC PNL TOTAL CA: CPT

## 2024-06-26 PROCEDURE — 700111 HCHG RX REV CODE 636 W/ 250 OVERRIDE (IP): Mod: JZ | Performed by: INTERNAL MEDICINE

## 2024-06-26 PROCEDURE — 700105 HCHG RX REV CODE 258: Performed by: HOSPITALIST

## 2024-06-26 PROCEDURE — 770006 HCHG ROOM/CARE - MED/SURG/GYN SEMI*

## 2024-06-26 PROCEDURE — 700102 HCHG RX REV CODE 250 W/ 637 OVERRIDE(OP): Performed by: INTERNAL MEDICINE

## 2024-06-26 PROCEDURE — 99232 SBSQ HOSP IP/OBS MODERATE 35: CPT | Performed by: HOSPITALIST

## 2024-06-26 RX ADMIN — ONDANSETRON 4 MG: 4 TABLET, ORALLY DISINTEGRATING ORAL at 01:00

## 2024-06-26 RX ADMIN — ANTACID TABLETS 500 MG: 500 TABLET, CHEWABLE ORAL at 03:56

## 2024-06-26 RX ADMIN — PRENATAL WITH FERROUS FUM AND FOLIC ACID 1 TABLET: 3080; 920; 120; 400; 22; 1.84; 3; 20; 10; 1; 12; 200; 27; 25; 2 TABLET ORAL at 07:29

## 2024-06-26 RX ADMIN — ANTACID TABLETS 500 MG: 500 TABLET, CHEWABLE ORAL at 17:36

## 2024-06-26 RX ADMIN — METOCLOPRAMIDE 10 MG: 5 INJECTION, SOLUTION INTRAMUSCULAR; INTRAVENOUS at 04:01

## 2024-06-26 RX ADMIN — OMEPRAZOLE 20 MG: 20 CAPSULE, DELAYED RELEASE ORAL at 17:36

## 2024-06-26 RX ADMIN — OMEPRAZOLE 20 MG: 20 CAPSULE, DELAYED RELEASE ORAL at 03:55

## 2024-06-26 RX ADMIN — LORAZEPAM 0.5 MG: 2 INJECTION INTRAMUSCULAR; INTRAVENOUS at 08:50

## 2024-06-26 RX ADMIN — DEXTROSE AND SODIUM CHLORIDE: 5; 450 INJECTION, SOLUTION INTRAVENOUS at 00:20

## 2024-06-26 RX ADMIN — ONDANSETRON 4 MG: 4 TABLET, ORALLY DISINTEGRATING ORAL at 07:29

## 2024-06-26 RX ADMIN — LORAZEPAM 0.5 MG: 2 INJECTION INTRAMUSCULAR; INTRAVENOUS at 13:49

## 2024-06-26 RX ADMIN — ONDANSETRON 4 MG: 4 TABLET, ORALLY DISINTEGRATING ORAL at 16:20

## 2024-06-26 RX ADMIN — ONDANSETRON 4 MG: 4 TABLET, ORALLY DISINTEGRATING ORAL at 11:55

## 2024-06-26 RX ADMIN — SCOPOLAMINE 1 PATCH: 1.5 PATCH, EXTENDED RELEASE TRANSDERMAL at 17:35

## 2024-06-26 RX ADMIN — THIAMINE HYDROCHLORIDE 500 MG: 100 INJECTION, SOLUTION INTRAMUSCULAR; INTRAVENOUS at 05:55

## 2024-06-26 RX ADMIN — METOCLOPRAMIDE 10 MG: 5 INJECTION, SOLUTION INTRAMUSCULAR; INTRAVENOUS at 10:06

## 2024-06-26 RX ADMIN — Medication 25 MG: at 05:54

## 2024-06-26 ASSESSMENT — ENCOUNTER SYMPTOMS
NAUSEA: 1
BACK PAIN: 0
SHORTNESS OF BREATH: 0
PALPITATIONS: 0
FEVER: 0
ABDOMINAL PAIN: 0
DIARRHEA: 0
VOMITING: 1
COUGH: 0
CHILLS: 0
BLOOD IN STOOL: 0
LOSS OF CONSCIOUSNESS: 0
DIZZINESS: 0
HEADACHES: 0

## 2024-06-26 ASSESSMENT — PAIN DESCRIPTION - PAIN TYPE
TYPE: ACUTE PAIN

## 2024-06-26 ASSESSMENT — PATIENT HEALTH QUESTIONNAIRE - PHQ9
SUM OF ALL RESPONSES TO PHQ9 QUESTIONS 1 AND 2: 0
2. FEELING DOWN, DEPRESSED, IRRITABLE, OR HOPELESS: NOT AT ALL
1. LITTLE INTEREST OR PLEASURE IN DOING THINGS: NOT AT ALL

## 2024-06-26 NOTE — CARE PLAN
The patient is Stable - Low risk of patient condition declining or worsening    Shift Goals  Clinical Goals: control nausea and advance diet when tolerated  Patient Goals: n/v control and home soon  Family Goals: N/V control    Progress made toward(s) clinical / shift goals:  Due med given for nausea.     Patient is not progressing towards the following goals:

## 2024-06-26 NOTE — PROGRESS NOTES
"Received alert and oriented x 4. Check vitals sign and recorded accordingly and due med given per MAR. Monitor sign and symptoms of respiratory distress and treatment given accordingly per MAR.Medicated per MAR and reassessed every 2 hours per protocol. Call light within reach. Steady on her feet. Minimal nausea stated by the pt.  Needs attended. Continue to monitor./80   Pulse 82   Temp 36.6 °C (97.8 °F) (Temporal)   Resp 16   Ht 1.626 m (5' 4\")   Wt 76.4 kg (168 lb 6.9 oz)   LMP 04/16/2024 (Exact Date)   SpO2 100%   BMI 28.91 kg/m² .  "

## 2024-06-26 NOTE — PROGRESS NOTES
Hospital Medicine Daily Progress Note    Date of Service  6/26/2024    Chief Complaint  Vita Giordano is a 24 y.o. female admitted 6/23/2024 with N/V    Hospital Course  25yo at 9wks IUP presenting with hyperemesis gravidarum    Interval Problem Update  Feels a little better today.  Still on liquid diet but taking more in.  No belly pain.  Is up and walking in the unit several times daily    I have discussed this patient's plan of care and discharge plan at IDT rounds today with Case Management, Nursing, Nursing leadership, and other members of the IDT team.    Consultants/Specialty      Code Status  Full Code    Disposition  The patient is not medically cleared for discharge to home or a post-acute facility.  Anticipate discharge to: home with close outpatient follow-up    I have placed the appropriate orders for post-discharge needs.    Review of Systems  Review of Systems   Constitutional:  Negative for chills and fever.   Respiratory:  Negative for cough and shortness of breath.    Cardiovascular:  Negative for chest pain, palpitations and leg swelling.   Gastrointestinal:  Positive for nausea and vomiting. Negative for abdominal pain, blood in stool, diarrhea and melena.   Genitourinary:  Negative for dysuria and urgency.   Musculoskeletal:  Negative for back pain.   Skin:  Negative for rash.   Neurological:  Negative for dizziness, loss of consciousness and headaches.        Physical Exam  Temp:  [36.1 °C (97 °F)-36.6 °C (97.8 °F)] 36.4 °C (97.6 °F)  Pulse:  [82-93] 93  Resp:  [16-17] 17  BP: (119-125)/(73-85) 122/73  SpO2:  [94 %-100 %] 94 %    Physical Exam  Constitutional:       General: She is not in acute distress.     Appearance: Normal appearance. She is well-developed. She is not diaphoretic.   HENT:      Head: Normocephalic and atraumatic.   Neck:      Vascular: No JVD.   Cardiovascular:      Rate and Rhythm: Normal rate and regular rhythm.      Heart sounds: No murmur heard.  Pulmonary:       Effort: Pulmonary effort is normal. No respiratory distress.      Breath sounds: No stridor. No wheezing or rales.   Abdominal:      Palpations: Abdomen is soft.      Tenderness: There is no abdominal tenderness. There is no guarding or rebound.   Musculoskeletal:      Right lower leg: No edema.      Left lower leg: No edema.   Skin:     General: Skin is warm and dry.      Capillary Refill: Capillary refill takes less than 2 seconds.      Findings: No rash.   Neurological:      Mental Status: She is oriented to person, place, and time.   Psychiatric:         Mood and Affect: Mood normal.         Behavior: Behavior normal.         Thought Content: Thought content normal.         Fluids    Intake/Output Summary (Last 24 hours) at 6/26/2024 0710  Last data filed at 6/26/2024 0400  Gross per 24 hour   Intake --   Output 80 ml   Net -80 ml       Laboratory  Recent Labs     06/24/24  0148 06/25/24  0141   WBC 9.4 8.1   RBC 4.26 4.53   HEMOGLOBIN 12.6 12.8   HEMATOCRIT 35.7* 38.4   MCV 83.8 84.8   MCH 29.6 28.3   MCHC 35.3 33.3   RDW 37.8 36.8   PLATELETCT 252 222   MPV 10.5 10.3     Recent Labs     06/24/24  0148 06/25/24  0141 06/26/24  0109   SODIUM 136 133* 136   POTASSIUM 3.8 3.7 3.2*   CHLORIDE 109 110 109   CO2 13* 8* 13*   GLUCOSE 85 69 114*   BUN 4* 3* 2*   CREATININE 0.30* 0.29* 0.34*   CALCIUM 8.4* 8.5 8.8                   Imaging  No orders to display        Assessment/Plan  * Hyperemesis gravidarum- (present on admission)  Assessment & Plan  Supportive care with IV fluid hydration  Start omeprazole and calcium carb po BID  Anti-emetics: Zofran and reglan  Daily BMP  Follow and replace electrolytes  Abd exam benign    6/26  Slow improvement but still not taking po consistently  Trial off IVFs  Follow daily BMP    High anion gap metabolic acidosis- (present on admission)  Assessment & Plan  IV fluid hydration: DC NS and start D5.45NS  Monitor BMP daily    6/26  Still acidotic but improved  Trial off of  IVFs    8 weeks gestation of pregnancy- (present on admission)  Assessment & Plan  Confirmed on US: JARROD 1/26/25  Continue prenatal vitamins, folate  IV load thiamine  Follow-up with OB in clinic         VTE prophylaxis: VTE Selection    I have performed a physical exam and reviewed and updated ROS and Plan today (6/26/2024). In review of yesterday's note (6/25/2024), there are no changes except as documented above.

## 2024-06-26 NOTE — CARE PLAN
Problem: Gastrointestinal Irritability  Goal: Nausea and vomiting will be absent or improve  Outcome: Progressing  Note: Patient's nausea has been controlled with prn pain meds this shift, permitting some PO intake; imrpoved from previous shift   The patient is Stable - Low risk of patient condition declining or worsening    Shift Goals  Clinical Goals: control nausea and advance diet when tolerated  Patient Goals: n/v control and home soon  Family Goals: N/V control    Progress made toward(s) clinical / shift goals:  progressing    Patient is not progressing towards the following goals:

## 2024-06-27 VITALS
HEIGHT: 64 IN | DIASTOLIC BLOOD PRESSURE: 64 MMHG | OXYGEN SATURATION: 100 % | HEART RATE: 69 BPM | BODY MASS INDEX: 28.76 KG/M2 | SYSTOLIC BLOOD PRESSURE: 109 MMHG | TEMPERATURE: 96.6 F | RESPIRATION RATE: 17 BRPM | WEIGHT: 168.43 LBS

## 2024-06-27 PROBLEM — O21.0 HYPEREMESIS GRAVIDARUM: Status: RESOLVED | Noted: 2024-06-23 | Resolved: 2024-06-27

## 2024-06-27 PROBLEM — E87.29 HIGH ANION GAP METABOLIC ACIDOSIS: Status: RESOLVED | Noted: 2024-06-23 | Resolved: 2024-06-27

## 2024-06-27 LAB
ANION GAP SERPL CALC-SCNC: 11 MMOL/L (ref 7–16)
BUN SERPL-MCNC: 5 MG/DL (ref 8–22)
CALCIUM SERPL-MCNC: 8.9 MG/DL (ref 8.5–10.5)
CHLORIDE SERPL-SCNC: 107 MMOL/L (ref 96–112)
CO2 SERPL-SCNC: 20 MMOL/L (ref 20–33)
CREAT SERPL-MCNC: 0.31 MG/DL (ref 0.5–1.4)
GFR SERPLBLD CREATININE-BSD FMLA CKD-EPI: 149 ML/MIN/1.73 M 2
GLUCOSE SERPL-MCNC: 99 MG/DL (ref 65–99)
POTASSIUM SERPL-SCNC: 3.3 MMOL/L (ref 3.6–5.5)
SODIUM SERPL-SCNC: 138 MMOL/L (ref 135–145)

## 2024-06-27 PROCEDURE — 700111 HCHG RX REV CODE 636 W/ 250 OVERRIDE (IP): Mod: JZ

## 2024-06-27 PROCEDURE — 700111 HCHG RX REV CODE 636 W/ 250 OVERRIDE (IP): Performed by: HOSPITALIST

## 2024-06-27 PROCEDURE — 36415 COLL VENOUS BLD VENIPUNCTURE: CPT

## 2024-06-27 PROCEDURE — 700102 HCHG RX REV CODE 250 W/ 637 OVERRIDE(OP): Performed by: INTERNAL MEDICINE

## 2024-06-27 PROCEDURE — A9270 NON-COVERED ITEM OR SERVICE: HCPCS | Performed by: HOSPITALIST

## 2024-06-27 PROCEDURE — A9270 NON-COVERED ITEM OR SERVICE: HCPCS | Performed by: INTERNAL MEDICINE

## 2024-06-27 PROCEDURE — 80048 BASIC METABOLIC PNL TOTAL CA: CPT

## 2024-06-27 PROCEDURE — 700105 HCHG RX REV CODE 258: Performed by: HOSPITALIST

## 2024-06-27 PROCEDURE — 700102 HCHG RX REV CODE 250 W/ 637 OVERRIDE(OP): Performed by: HOSPITALIST

## 2024-06-27 RX ORDER — CALCIUM CARBONATE 500 MG/1
500 TABLET, CHEWABLE ORAL 2 TIMES DAILY
Qty: 100 TABLET | Refills: 0 | Status: SHIPPED | OUTPATIENT
Start: 2024-06-27

## 2024-06-27 RX ORDER — ONDANSETRON 4 MG/1
4 TABLET, ORALLY DISINTEGRATING ORAL EVERY 6 HOURS PRN
Qty: 20 TABLET | Refills: 0 | Status: SHIPPED | OUTPATIENT
Start: 2024-06-27

## 2024-06-27 RX ORDER — OMEPRAZOLE 20 MG/1
20 CAPSULE, DELAYED RELEASE ORAL 2 TIMES DAILY
Qty: 30 CAPSULE | Refills: 0 | Status: SHIPPED | OUTPATIENT
Start: 2024-06-27

## 2024-06-27 RX ADMIN — OMEPRAZOLE 20 MG: 20 CAPSULE, DELAYED RELEASE ORAL at 04:55

## 2024-06-27 RX ADMIN — PRENATAL WITH FERROUS FUM AND FOLIC ACID 1 TABLET: 3080; 920; 120; 400; 22; 1.84; 3; 20; 10; 1; 12; 200; 27; 25; 2 TABLET ORAL at 08:58

## 2024-06-27 RX ADMIN — THIAMINE HYDROCHLORIDE 500 MG: 100 INJECTION, SOLUTION INTRAMUSCULAR; INTRAVENOUS at 04:58

## 2024-06-27 RX ADMIN — ANTACID TABLETS 500 MG: 500 TABLET, CHEWABLE ORAL at 04:55

## 2024-06-27 RX ADMIN — ONDANSETRON 4 MG: 2 INJECTION INTRAMUSCULAR; INTRAVENOUS at 04:55

## 2024-06-27 RX ADMIN — Medication 25 MG: at 04:55

## 2024-06-27 RX ADMIN — ONDANSETRON 4 MG: 2 INJECTION INTRAMUSCULAR; INTRAVENOUS at 08:58

## 2024-06-27 ASSESSMENT — PAIN DESCRIPTION - PAIN TYPE: TYPE: ACUTE PAIN

## 2024-06-27 NOTE — PROGRESS NOTES
1900 Received RN report, assumed care for this patient  AO x 4, awake, resting in bed  Denies any pain at this time  No complaint of n/v  Family at bedside prior to bedtime  3 side rails up, bed in lowest position  Call light and personal items within reached  Frequent rounding, Kept safe and continue monitoring.     2322- Resting, sleeping. Respirations even and unlabored

## 2024-06-27 NOTE — CARE PLAN
The patient is Stable - Low risk of patient condition declining or worsening    Shift Goals  Clinical Goals: monitor n/v and treat, tolerate diet, sleeps more than 5 hours  Patient Goals: n/v mgt, discharge  Family Goals: updates    Progress made toward(s) clinical / shift goals:    Problem: Knowledge Deficit - Standard  Goal: Patient and family/care givers will demonstrate understanding of plan of care, disease process/condition, diagnostic tests and medications  Outcome: Progressing     Problem: Gastrointestinal Irritability  Goal: Nausea and vomiting will be absent or improve  Outcome: Progressing     1900 Received RN report, assumed care for this patient  AO x 4, awake, resting in bed  Denies any pain at this time  No complaint of n/v  Family at bedside prior to bedtime  3 side rails up, bed in lowest position  Call light and personal items within reached  Frequent rounding, Kept safe and continue monitoring.    2322- Resting, sleeping. Respirations even and unlabored    Patient is not progressing towards the following goals:

## 2024-06-27 NOTE — DISCHARGE PLANNING
Case Management Discharge Planning    Admission Date: 6/23/2024  GMLOS: 1.9  ALOS: 1    6-Clicks ADL Score: 24  6-Clicks Mobility Score: 24    Anticipated Discharge Dispo: Discharge Disposition: Discharged to home/self care (01)  Discharge Address: [address]  [place] [address]  Discharge Contact Phone Number: [phone]    DME Needed: No    Action(s) Taken:   Pt discussed during IDT rounds. Per MD, pt is medically clear.     CM RN met with pt and significant other Phil at the bedside to discuss discharge plan. Phil stated he is to provide pt with transportation home upon discharge today. Anticipate no further CM RN needs at this time.     Escalations Completed: None    Medically Clear: Yes    Next Steps:  CM RN to follow up with medical team to discuss discharge barriers or needs.     Barriers to Discharge: None

## 2024-06-27 NOTE — PROGRESS NOTES
IV removed. Discharge paperwork discussed. All questions answered. Follow up appointment discussed. Patient discharged home walking with relative. Patient has all personal belongings.

## 2024-06-28 ENCOUNTER — TELEPHONE (OUTPATIENT)
Dept: HEALTH INFORMATION MANAGEMENT | Facility: OTHER | Age: 24
End: 2024-06-28
Payer: COMMERCIAL

## 2024-06-28 PROCEDURE — 99239 HOSP IP/OBS DSCHRG MGMT >30: CPT | Performed by: HOSPITALIST

## 2024-06-28 NOTE — DISCHARGE SUMMARY
Discharge Summary    CHIEF COMPLAINT ON ADMISSION  Chief Complaint   Patient presents with    Pregnancy     9 weeks pregnant    N/V     Last night approx 5pm  'I've probably thrown up every half hour since'   Endorses taking prescribed zofran without relief.       Reason for Admission  n/v , abdominal pain, 9 weeks preg     Admission Date  6/23/2024    CODE STATUS  Prior    HPI & HOSPITAL COURSE  This is a 24 y.o. female here with no significant previous medical history.  Patient presented on June 23 for evaluation of nausea and vomiting.  She had recently been found to be approximately 9 weeks pregnant.  At home she was having difficulty keeping anything down.  On presentation she was noted to be tachycardic with an anion gap of 17, CO2 of 14.  She was given antiemetics and IV fluids however did not improve to the point where she could keep things down and therefore was admitted.    In-house the patient was treated supportively with IV fluids, antiemetics and electrolyte correction.  She improved fully and her acidosis resolved with a CO2 of 20 on discharge.  Ultrasound was done on her intrauterine pregnancy which confirmed this, and given estimated date of delivery of January 26, 2025.  She was given IV thiamine while in house, as well as continued her prenatal vitamin and folate supplementation.    At this point we are discharging patient home.  She is tolerating a normal diet here in house and maintaining her hydration status.  We will send her home with as needed Zofran, and she already has scheduled follow-up with OB.  No notes on file    Therefore, she is discharged in good and stable condition to home with close outpatient follow-up.    The patient met 2-midnight criteria for an inpatient stay at the time of discharge.    Discharge Date  6/27/2024    FOLLOW UP ITEMS POST DISCHARGE  With OB/GYN    DISCHARGE DIAGNOSES  Principal Problem (Resolved):    Hyperemesis gravidarum (POA: Yes)  Active Problems:    8  weeks gestation of pregnancy (POA: Yes)  Resolved Problems:    High anion gap metabolic acidosis (POA: Yes)      FOLLOW UP  No future appointments.  Isa Santoyo P.A.-C.  [address] [id number] [phone]            MEDICATIONS ON DISCHARGE     Medication List        START taking these medications        Instructions   calcium carbonate 500 MG Chew  Commonly known as: Tums   Chew 1 Tablet 2 times a day.  Dose: 500 mg     omeprazole 20 MG delayed-release capsule  Commonly known as: PriLOSEC   Take 1 Capsule by mouth 2 times a day.  Dose: 20 mg            CONTINUE taking these medications        Instructions   ondansetron 4 MG Tbdp  Commonly known as: Zofran ODT   Take 1 Tablet by mouth every 6 hours as needed for Nausea/Vomiting.  Dose: 4 mg     pyridoxine 25 MG Tabs  Commonly known as: Vitamin B-6   Take 25 mg by mouth every day.  Dose: 25 mg              Allergies  Allergies   Allergen Reactions    Hydrocodone Vomiting and Nausea       DIET  No orders of the defined types were placed in this encounter.      ACTIVITY  As tolerated.  Weight bearing as tolerated    CONSULTATIONS      PROCEDURES      LABORATORY  Lab Results   Component Value Date    SODIUM 138 06/27/2024    POTASSIUM 3.3 (L) 06/27/2024    CHLORIDE 107 06/27/2024    CO2 20 06/27/2024    GLUCOSE 99 06/27/2024    BUN 5 (L) 06/27/2024    CREATININE 0.31 (L) 06/27/2024        Lab Results   Component Value Date    WBC 8.1 06/25/2024    HEMOGLOBIN 12.8 06/25/2024    HEMATOCRIT 38.4 06/25/2024    PLATELETCT 222 06/25/2024        Total time of the discharge process exceeds 35 minutes.  Most of that time spent with the patient reviewing discharge planning and instructions.  On the day of discharge she is feeling much better and looking forward to going home.

## 2024-07-10 ENCOUNTER — HOSPITAL ENCOUNTER (OUTPATIENT)
Dept: LAB | Facility: MEDICAL CENTER | Age: 24
End: 2024-07-10
Attending: OBSTETRICS & GYNECOLOGY
Payer: COMMERCIAL

## 2024-07-10 LAB
ABO GROUP BLD: NORMAL
BASOPHILS # BLD AUTO: 0.4 % (ref 0–1.8)
BASOPHILS # BLD: 0.03 K/UL (ref 0–0.12)
BLD GP AB SCN SERPL QL: NORMAL
EOSINOPHIL # BLD AUTO: 0.08 K/UL (ref 0–0.51)
EOSINOPHIL NFR BLD: 1.1 % (ref 0–6.9)
ERYTHROCYTE [DISTWIDTH] IN BLOOD BY AUTOMATED COUNT: 38.4 FL (ref 35.9–50)
HBV SURFACE AG SER QL: NORMAL
HCT VFR BLD AUTO: 38.2 % (ref 37–47)
HCV AB SER QL: NORMAL
HGB BLD-MCNC: 13.1 G/DL (ref 12–16)
HIV 1+2 AB+HIV1 P24 AG SERPL QL IA: NORMAL
IMM GRANULOCYTES # BLD AUTO: 0.03 K/UL (ref 0–0.11)
IMM GRANULOCYTES NFR BLD AUTO: 0.4 % (ref 0–0.9)
LYMPHOCYTES # BLD AUTO: 1.56 K/UL (ref 1–4.8)
LYMPHOCYTES NFR BLD: 21.5 % (ref 22–41)
MCH RBC QN AUTO: 29 PG (ref 27–33)
MCHC RBC AUTO-ENTMCNC: 34.3 G/DL (ref 32.2–35.5)
MCV RBC AUTO: 84.5 FL (ref 81.4–97.8)
MONOCYTES # BLD AUTO: 0.35 K/UL (ref 0–0.85)
MONOCYTES NFR BLD AUTO: 4.8 % (ref 0–13.4)
NEUTROPHILS # BLD AUTO: 5.22 K/UL (ref 1.82–7.42)
NEUTROPHILS NFR BLD: 71.8 % (ref 44–72)
NRBC # BLD AUTO: 0 K/UL
NRBC BLD-RTO: 0 /100 WBC (ref 0–0.2)
PLATELET # BLD AUTO: 276 K/UL (ref 164–446)
PMV BLD AUTO: 10.1 FL (ref 9–12.9)
RBC # BLD AUTO: 4.52 M/UL (ref 4.2–5.4)
RH BLD: NORMAL
RUBV AB SER QL: 22.4 IU/ML
T PALLIDUM AB SER QL IA: NORMAL
WBC # BLD AUTO: 7.3 K/UL (ref 4.8–10.8)

## 2024-07-10 PROCEDURE — 87086 URINE CULTURE/COLONY COUNT: CPT

## 2024-07-10 PROCEDURE — 36415 COLL VENOUS BLD VENIPUNCTURE: CPT

## 2024-07-10 PROCEDURE — 86900 BLOOD TYPING SEROLOGIC ABO: CPT

## 2024-07-10 PROCEDURE — 86762 RUBELLA ANTIBODY: CPT

## 2024-07-10 PROCEDURE — 87389 HIV-1 AG W/HIV-1&-2 AB AG IA: CPT

## 2024-07-10 PROCEDURE — 86850 RBC ANTIBODY SCREEN: CPT

## 2024-07-10 PROCEDURE — 85025 COMPLETE CBC W/AUTO DIFF WBC: CPT

## 2024-07-10 PROCEDURE — 86803 HEPATITIS C AB TEST: CPT

## 2024-07-10 PROCEDURE — 87340 HEPATITIS B SURFACE AG IA: CPT

## 2024-07-10 PROCEDURE — 86780 TREPONEMA PALLIDUM: CPT

## 2024-07-10 PROCEDURE — 86901 BLOOD TYPING SEROLOGIC RH(D): CPT

## 2024-07-12 LAB
BACTERIA UR CULT: NORMAL
SIGNIFICANT IND 70042: NORMAL
SITE SITE: NORMAL
SOURCE SOURCE: NORMAL

## 2024-10-22 ENCOUNTER — HOSPITAL ENCOUNTER (OUTPATIENT)
Dept: LAB | Facility: MEDICAL CENTER | Age: 24
End: 2024-10-22
Attending: OBSTETRICS & GYNECOLOGY
Payer: COMMERCIAL

## 2024-10-22 LAB
BASOPHILS # BLD AUTO: 0.3 % (ref 0–1.8)
BASOPHILS # BLD: 0.03 K/UL (ref 0–0.12)
EOSINOPHIL # BLD AUTO: 0.11 K/UL (ref 0–0.51)
EOSINOPHIL NFR BLD: 1.1 % (ref 0–6.9)
ERYTHROCYTE [DISTWIDTH] IN BLOOD BY AUTOMATED COUNT: 40.5 FL (ref 35.9–50)
GLUCOSE 1H P 50 G GLC PO SERPL-MCNC: 98 MG/DL (ref 70–139)
HCT VFR BLD AUTO: 34.2 % (ref 37–47)
HGB BLD-MCNC: 11.2 G/DL (ref 12–16)
IMM GRANULOCYTES # BLD AUTO: 0.04 K/UL (ref 0–0.11)
IMM GRANULOCYTES NFR BLD AUTO: 0.4 % (ref 0–0.9)
LYMPHOCYTES # BLD AUTO: 1.43 K/UL (ref 1–4.8)
LYMPHOCYTES NFR BLD: 14.9 % (ref 22–41)
MCH RBC QN AUTO: 29.9 PG (ref 27–33)
MCHC RBC AUTO-ENTMCNC: 32.7 G/DL (ref 32.2–35.5)
MCV RBC AUTO: 91.4 FL (ref 81.4–97.8)
MONOCYTES # BLD AUTO: 0.46 K/UL (ref 0–0.85)
MONOCYTES NFR BLD AUTO: 4.8 % (ref 0–13.4)
NEUTROPHILS # BLD AUTO: 7.51 K/UL (ref 1.82–7.42)
NEUTROPHILS NFR BLD: 78.5 % (ref 44–72)
NRBC # BLD AUTO: 0 K/UL
NRBC BLD-RTO: 0 /100 WBC (ref 0–0.2)
PLATELET # BLD AUTO: 241 K/UL (ref 164–446)
PMV BLD AUTO: 10 FL (ref 9–12.9)
RBC # BLD AUTO: 3.74 M/UL (ref 4.2–5.4)
T PALLIDUM AB SER QL IA: NORMAL
WBC # BLD AUTO: 9.6 K/UL (ref 4.8–10.8)

## 2024-10-22 PROCEDURE — 86780 TREPONEMA PALLIDUM: CPT

## 2024-10-22 PROCEDURE — 82950 GLUCOSE TEST: CPT

## 2024-10-22 PROCEDURE — 85025 COMPLETE CBC W/AUTO DIFF WBC: CPT

## 2024-10-22 PROCEDURE — 36415 COLL VENOUS BLD VENIPUNCTURE: CPT

## 2024-12-27 ENCOUNTER — HOSPITAL ENCOUNTER (EMERGENCY)
Facility: MEDICAL CENTER | Age: 24
End: 2024-12-28
Attending: OBSTETRICS & GYNECOLOGY | Admitting: OBSTETRICS & GYNECOLOGY
Payer: COMMERCIAL

## 2024-12-27 VITALS
DIASTOLIC BLOOD PRESSURE: 74 MMHG | WEIGHT: 187 LBS | OXYGEN SATURATION: 98 % | HEIGHT: 64 IN | BODY MASS INDEX: 31.92 KG/M2 | RESPIRATION RATE: 18 BRPM | SYSTOLIC BLOOD PRESSURE: 113 MMHG | HEART RATE: 99 BPM | TEMPERATURE: 97.8 F

## 2024-12-27 LAB
ALBUMIN SERPL BCP-MCNC: 3.3 G/DL (ref 3.2–4.9)
ALBUMIN/GLOB SERPL: 0.9 G/DL
ALP SERPL-CCNC: 148 U/L (ref 30–99)
ALT SERPL-CCNC: 10 U/L (ref 2–50)
ANION GAP SERPL CALC-SCNC: 16 MMOL/L (ref 7–16)
AST SERPL-CCNC: 12 U/L (ref 12–45)
BILIRUB SERPL-MCNC: 0.2 MG/DL (ref 0.1–1.5)
BUN SERPL-MCNC: 7 MG/DL (ref 8–22)
CALCIUM ALBUM COR SERPL-MCNC: 9.2 MG/DL (ref 8.5–10.5)
CALCIUM SERPL-MCNC: 8.6 MG/DL (ref 8.5–10.5)
CHLORIDE SERPL-SCNC: 103 MMOL/L (ref 96–112)
CO2 SERPL-SCNC: 16 MMOL/L (ref 20–33)
CREAT SERPL-MCNC: 0.43 MG/DL (ref 0.5–1.4)
ERYTHROCYTE [DISTWIDTH] IN BLOOD BY AUTOMATED COUNT: 37.5 FL (ref 35.9–50)
GFR SERPLBLD CREATININE-BSD FMLA CKD-EPI: 137 ML/MIN/1.73 M 2
GLOBULIN SER CALC-MCNC: 3.6 G/DL (ref 1.9–3.5)
GLUCOSE SERPL-MCNC: 76 MG/DL (ref 65–99)
HCT VFR BLD AUTO: 32.7 % (ref 37–47)
HGB BLD-MCNC: 10.9 G/DL (ref 12–16)
MCH RBC QN AUTO: 28.4 PG (ref 27–33)
MCHC RBC AUTO-ENTMCNC: 33.3 G/DL (ref 32.2–35.5)
MCV RBC AUTO: 85.2 FL (ref 81.4–97.8)
PLATELET # BLD AUTO: 226 K/UL (ref 164–446)
PMV BLD AUTO: 10 FL (ref 9–12.9)
POTASSIUM SERPL-SCNC: 3.7 MMOL/L (ref 3.6–5.5)
PROT SERPL-MCNC: 6.9 G/DL (ref 6–8.2)
RBC # BLD AUTO: 3.84 M/UL (ref 4.2–5.4)
SODIUM SERPL-SCNC: 135 MMOL/L (ref 135–145)
WBC # BLD AUTO: 13.7 K/UL (ref 4.8–10.8)

## 2024-12-27 PROCEDURE — 99282 EMERGENCY DEPT VISIT SF MDM: CPT

## 2024-12-27 PROCEDURE — 85027 COMPLETE CBC AUTOMATED: CPT

## 2024-12-27 PROCEDURE — 81002 URINALYSIS NONAUTO W/O SCOPE: CPT

## 2024-12-27 PROCEDURE — 36415 COLL VENOUS BLD VENIPUNCTURE: CPT

## 2024-12-27 PROCEDURE — A9270 NON-COVERED ITEM OR SERVICE: HCPCS

## 2024-12-27 PROCEDURE — 59025 FETAL NON-STRESS TEST: CPT

## 2024-12-27 PROCEDURE — 0241U HCHG SARS-COV-2 COVID-19 NFCT DS RESP RNA 4 TRGT MIC: CPT

## 2024-12-27 PROCEDURE — 700102 HCHG RX REV CODE 250 W/ 637 OVERRIDE(OP)

## 2024-12-27 PROCEDURE — 700105 HCHG RX REV CODE 258

## 2024-12-27 PROCEDURE — 80053 COMPREHEN METABOLIC PANEL: CPT

## 2024-12-27 RX ORDER — SODIUM CHLORIDE, SODIUM LACTATE, POTASSIUM CHLORIDE, AND CALCIUM CHLORIDE .6; .31; .03; .02 G/100ML; G/100ML; G/100ML; G/100ML
1000 INJECTION, SOLUTION INTRAVENOUS ONCE
Status: COMPLETED | OUTPATIENT
Start: 2024-12-27 | End: 2024-12-27

## 2024-12-27 RX ORDER — ACETAMINOPHEN 500 MG
1000 TABLET ORAL ONCE
Status: COMPLETED | OUTPATIENT
Start: 2024-12-27 | End: 2024-12-27

## 2024-12-27 RX ADMIN — SODIUM CHLORIDE, POTASSIUM CHLORIDE, SODIUM LACTATE AND CALCIUM CHLORIDE 1000 ML: 600; 310; 30; 20 INJECTION, SOLUTION INTRAVENOUS at 23:15

## 2024-12-27 RX ADMIN — ACETAMINOPHEN 1000 MG: 500 TABLET ORAL at 23:40

## 2024-12-27 ASSESSMENT — PAIN SCALES - GENERAL: PAINLEVEL: 0 - NO PAIN

## 2024-12-27 ASSESSMENT — FIBROSIS 4 INDEX: FIB4 SCORE: 0.48

## 2024-12-28 LAB
FLUAV RNA SPEC QL NAA+PROBE: NEGATIVE
FLUBV RNA SPEC QL NAA+PROBE: NEGATIVE
RSV RNA SPEC QL NAA+PROBE: NEGATIVE
SARS-COV-2 RNA RESP QL NAA+PROBE: NOTDETECTED
SPECIMEN SOURCE: NORMAL

## 2024-12-28 PROCEDURE — 96374 THER/PROPH/DIAG INJ IV PUSH: CPT

## 2024-12-28 PROCEDURE — 99283 EMERGENCY DEPT VISIT LOW MDM: CPT

## 2024-12-28 PROCEDURE — 700111 HCHG RX REV CODE 636 W/ 250 OVERRIDE (IP): Performed by: OBSTETRICS & GYNECOLOGY

## 2024-12-28 RX ORDER — METOCLOPRAMIDE HYDROCHLORIDE 5 MG/ML
10 INJECTION INTRAMUSCULAR; INTRAVENOUS ONCE
Status: COMPLETED | OUTPATIENT
Start: 2024-12-28 | End: 2024-12-28

## 2024-12-28 RX ADMIN — METOCLOPRAMIDE 10 MG: 5 INJECTION, SOLUTION INTRAMUSCULAR; INTRAVENOUS at 01:57

## 2024-12-28 NOTE — PROGRESS NOTES
224- Patient arrived to triage for cramping and flu s/s that started on 24. Patient is a  with an JARROD of 25 making the patient 36.3 weeks pregnant. Patient denies LOF or VB and confirms good FM. External toco and FHM applied to patient. Vitals obtained. MFTI filed.     - Report given to FIORELLA Moreau. Received orders, refer to order hx.     0206- FIORELLA Moreau at bedside. Patient reports feeling much better. Received discharge orders.     0220-  Discharge instructions discussed, patient verbalized understanding. UC's, ROM, VB, abn FM, when to return to L&D discussed. Patient escorted off unit without any complications.

## 2024-12-28 NOTE — ED PROVIDER NOTES
"S: Pt is a 24 y.o.  at 36w4d with Estimated Date of Delivery: 25 who presents to triage c/o body aches, cramping, and general malaise starting today.  Denies VB, RUCs, LOF.  Reports good FM.  Pt reports prenatal care with [facility]. Pt reports normal ultrasounds and prenatal course.     O: /74   Pulse 99   Temp 36.6 °C (97.8 °F) (Oral)   Resp 18   Ht 1.626 m (5' 4\")   Wt 84.8 kg (187 lb)   LMP 2024 (Exact Date)   SpO2 98%   BMI 32.10 kg/m²          NST: Done and read by me            FHR: 140       Variability: moderate       Acclerations: present       Decelerations: absent       Reactive: yes         [place]: mild UCs every 2-5 minutes       SVE: n/a         A/P  Patient Active Problem List    Diagnosis Date Noted    8 weeks gestation of pregnancy 2024    Vitamin D deficiency 2021    Uses contraceptive implant for birth control 2021    Anxiety 2021    Chronic fatigue 2021    Dry skin 2021    Hair loss 2021    Cold intolerance 2021    Seasonal allergies 2021    Moderate episode of recurrent major depressive disorder (HCC) 2019       1.  IUP @ 36w4d  2.  Reactive NST.  3.  Viral respiratory panel negative  4.  IV hydration, tylenol PO  5.  After interventions, pt feeling well.   6.  CBC/CMP WNL. Anemia noted. Needs to take iron supplement daily  7.   Labor and FKC precautions given. Infection precautions given.   8. F/u with provider in [place] next week.     Lizzeth Link CNM, APRN        "

## 2024-12-28 NOTE — DISCHARGE INSTRUCTIONS
Pre-term Labor (<37 weeks):  Call your physician or return to the hospital if:  You have painless regular contractions more than 4 in one hour.  Your water breaks (remember time and color).  You have menstrual-like cramps, a low dull backache or pressure in your pelvis or back.  Your baby does not move enough to complete the daily kick count (10 movements in 2 hours).  Your baby moves much less often than on the days before or you have not felt your baby move all day.  Please review the MEDICATION LIST section of your AFTER VISIT SUMMARY document.  Take your medication as prescribed

## 2024-12-30 LAB
APPEARANCE UR: CLEAR
BILIRUB UR QL STRIP.AUTO: ABNORMAL
COLOR UR AUTO: ABNORMAL
GLUCOSE UR QL STRIP.AUTO: NEGATIVE MG/DL
KETONES UR QL STRIP.AUTO: 80 MG/DL
LEUKOCYTE ESTERASE UR QL STRIP.AUTO: NEGATIVE
NITRITE UR QL STRIP.AUTO: NEGATIVE
PH UR STRIP.AUTO: 6 [PH] (ref 5–8)
PROT UR QL STRIP: ABNORMAL MG/DL
RBC UR QL AUTO: NEGATIVE
SP GR UR STRIP.AUTO: >=1.03 (ref 1–1.03)
UROBILINOGEN UR STRIP.AUTO-MCNC: 0.2 MG/DL